# Patient Record
Sex: MALE | Race: BLACK OR AFRICAN AMERICAN | Employment: UNEMPLOYED | URBAN - METROPOLITAN AREA
[De-identification: names, ages, dates, MRNs, and addresses within clinical notes are randomized per-mention and may not be internally consistent; named-entity substitution may affect disease eponyms.]

---

## 2015-11-14 LAB
LEFT VENTRICULAR EJECTION FRACTION MODE: NORMAL
LV EF: 40 %

## 2018-05-09 ENCOUNTER — HOSPITAL ENCOUNTER (OUTPATIENT)
Age: 53
Discharge: HOME | End: 2018-05-09
Payer: COMMERCIAL

## 2018-05-09 ENCOUNTER — HOSPITAL ENCOUNTER (OUTPATIENT)
Dept: GENERAL RADIOLOGY | Age: 53
Discharge: HOME | End: 2018-05-11
Payer: COMMERCIAL

## 2018-05-09 ENCOUNTER — HOSPITAL ENCOUNTER (OUTPATIENT)
Age: 53
Discharge: HOME | End: 2018-05-11
Payer: COMMERCIAL

## 2018-05-09 DIAGNOSIS — R06.02 SOB (SHORTNESS OF BREATH): ICD-10-CM

## 2018-05-09 LAB — C4 COMPLEMENT: 41 MG/DL (ref 10–40)

## 2018-05-09 PROCEDURE — 86481 TB AG RESPONSE T-CELL SUSP: CPT

## 2018-05-09 PROCEDURE — 36415 COLL VENOUS BLD VENIPUNCTURE: CPT

## 2018-05-09 PROCEDURE — 86160 COMPLEMENT ANTIGEN: CPT

## 2018-05-09 PROCEDURE — 71046 X-RAY EXAM CHEST 2 VIEWS: CPT

## 2018-05-14 LAB
COMMENT: NORMAL
REPORT: NORMAL

## 2018-07-31 ENCOUNTER — HOSPITAL ENCOUNTER (OUTPATIENT)
Age: 53
Discharge: HOME | End: 2018-08-02

## 2018-07-31 LAB — POTASSIUM SERPL-SCNC: 3.5 MMOL/L (ref 3.5–5)

## 2018-07-31 PROCEDURE — 84132 ASSAY OF SERUM POTASSIUM: CPT

## 2018-12-04 ENCOUNTER — HOSPITAL ENCOUNTER (EMERGENCY)
Age: 53
Discharge: HOME | End: 2018-12-04
Payer: COMMERCIAL

## 2018-12-04 VITALS
SYSTOLIC BLOOD PRESSURE: 156 MMHG | RESPIRATION RATE: 18 BRPM | OXYGEN SATURATION: 98 % | DIASTOLIC BLOOD PRESSURE: 96 MMHG | HEART RATE: 80 BPM | WEIGHT: 197 LBS | BODY MASS INDEX: 31.66 KG/M2 | TEMPERATURE: 97.8 F | HEIGHT: 66 IN

## 2018-12-04 DIAGNOSIS — E87.6 HYPOKALEMIA: ICD-10-CM

## 2018-12-04 DIAGNOSIS — Z00.8 MEDICAL CLEARANCE FOR INCARCERATION: ICD-10-CM

## 2018-12-04 DIAGNOSIS — I10 ESSENTIAL HYPERTENSION: Primary | ICD-10-CM

## 2018-12-04 LAB
EKG ATRIAL RATE: 77 BPM
EKG P AXIS: 63 DEGREES
EKG P-R INTERVAL: 152 MS
EKG Q-T INTERVAL: 392 MS
EKG QRS DURATION: 102 MS
EKG QTC CALCULATION (BAZETT): 443 MS
EKG R AXIS: 16 DEGREES
EKG T AXIS: -24 DEGREES
EKG VENTRICULAR RATE: 77 BPM
GFR AFRICAN AMERICAN: 6
GFR NON-AFRICAN AMERICAN: 5 ML/MIN/1.73
GLUCOSE BLD-MCNC: 165 MG/DL (ref 74–99)
POC CHLORIDE: 102 MMOL/L (ref 100–108)
POC CREATININE: 10.3 MG/DL (ref 0.7–1.2)
POC POTASSIUM: 3.4 MMOL/L (ref 3.5–5)
POC SODIUM: 142 MMOL/L (ref 132–146)

## 2018-12-04 PROCEDURE — 84132 ASSAY OF SERUM POTASSIUM: CPT

## 2018-12-04 PROCEDURE — 93005 ELECTROCARDIOGRAM TRACING: CPT | Performed by: NURSE PRACTITIONER

## 2018-12-04 PROCEDURE — 84295 ASSAY OF SERUM SODIUM: CPT

## 2018-12-04 PROCEDURE — 82565 ASSAY OF CREATININE: CPT

## 2018-12-04 PROCEDURE — 6370000000 HC RX 637 (ALT 250 FOR IP): Performed by: NURSE PRACTITIONER

## 2018-12-04 PROCEDURE — 99283 EMERGENCY DEPT VISIT LOW MDM: CPT

## 2018-12-04 PROCEDURE — 82435 ASSAY OF BLOOD CHLORIDE: CPT

## 2018-12-04 PROCEDURE — 82947 ASSAY GLUCOSE BLOOD QUANT: CPT

## 2018-12-04 RX ORDER — POTASSIUM BICARBONATE 25 MEQ/1
25 TABLET, EFFERVESCENT ORAL ONCE
Status: COMPLETED | OUTPATIENT
Start: 2018-12-04 | End: 2018-12-04

## 2018-12-04 RX ADMIN — POTASSIUM BICARBONATE 25 MEQ: 25 TABLET, EFFERVESCENT ORAL at 21:44

## 2019-02-08 ENCOUNTER — APPOINTMENT (OUTPATIENT)
Dept: CT IMAGING | Age: 54
End: 2019-02-08
Payer: COMMERCIAL

## 2019-02-08 ENCOUNTER — HOSPITAL ENCOUNTER (EMERGENCY)
Age: 54
Discharge: HOME | End: 2019-02-08
Attending: EMERGENCY MEDICINE
Payer: COMMERCIAL

## 2019-02-08 VITALS
HEART RATE: 76 BPM | RESPIRATION RATE: 16 BRPM | WEIGHT: 197 LBS | DIASTOLIC BLOOD PRESSURE: 98 MMHG | HEIGHT: 66 IN | OXYGEN SATURATION: 97 % | TEMPERATURE: 97.8 F | BODY MASS INDEX: 31.66 KG/M2 | SYSTOLIC BLOOD PRESSURE: 150 MMHG

## 2019-02-08 DIAGNOSIS — R31.9 HEMATURIA, UNSPECIFIED TYPE: Primary | ICD-10-CM

## 2019-02-08 DIAGNOSIS — I71.40 ABDOMINAL AORTIC ANEURYSM (AAA) WITHOUT RUPTURE: ICD-10-CM

## 2019-02-08 LAB
ALBUMIN SERPL-MCNC: 4.4 G/DL (ref 3.5–5.2)
ALP BLD-CCNC: 94 U/L (ref 40–129)
ALT SERPL-CCNC: <5 U/L (ref 0–40)
ANION GAP SERPL CALCULATED.3IONS-SCNC: 10 MMOL/L (ref 7–16)
AST SERPL-CCNC: 11 U/L (ref 0–39)
BASOPHILS ABSOLUTE: 0.05 E9/L (ref 0–0.2)
BASOPHILS RELATIVE PERCENT: 0.8 % (ref 0–2)
BILIRUB SERPL-MCNC: 0.3 MG/DL (ref 0–1.2)
BUN BLDV-MCNC: 28 MG/DL (ref 6–20)
CALCIUM SERPL-MCNC: 9.7 MG/DL (ref 8.6–10.2)
CHLORIDE BLD-SCNC: 97 MMOL/L (ref 98–107)
CO2: 33 MMOL/L (ref 22–29)
CREAT SERPL-MCNC: 9.2 MG/DL (ref 0.7–1.2)
EKG ATRIAL RATE: 66 BPM
EKG P AXIS: 70 DEGREES
EKG P-R INTERVAL: 164 MS
EKG Q-T INTERVAL: 422 MS
EKG QRS DURATION: 110 MS
EKG QTC CALCULATION (BAZETT): 442 MS
EKG R AXIS: 52 DEGREES
EKG T AXIS: -18 DEGREES
EKG VENTRICULAR RATE: 66 BPM
EOSINOPHILS ABSOLUTE: 0.16 E9/L (ref 0.05–0.5)
EOSINOPHILS RELATIVE PERCENT: 2.5 % (ref 0–6)
GFR AFRICAN AMERICAN: 7
GFR NON-AFRICAN AMERICAN: 7 ML/MIN/1.73
GLUCOSE BLD-MCNC: 54 MG/DL (ref 74–99)
HCT VFR BLD CALC: 36.7 % (ref 37–54)
HEMOGLOBIN: 11.3 G/DL (ref 12.5–16.5)
IMMATURE GRANULOCYTES #: 0.02 E9/L
IMMATURE GRANULOCYTES %: 0.3 % (ref 0–5)
LYMPHOCYTES ABSOLUTE: 1.6 E9/L (ref 1.5–4)
LYMPHOCYTES RELATIVE PERCENT: 24.9 % (ref 20–42)
MCH RBC QN AUTO: 29.6 PG (ref 26–35)
MCHC RBC AUTO-ENTMCNC: 30.8 % (ref 32–34.5)
MCV RBC AUTO: 96.1 FL (ref 80–99.9)
MONOCYTES ABSOLUTE: 0.67 E9/L (ref 0.1–0.95)
MONOCYTES RELATIVE PERCENT: 10.4 % (ref 2–12)
NEUTROPHILS ABSOLUTE: 3.93 E9/L (ref 1.8–7.3)
NEUTROPHILS RELATIVE PERCENT: 61.1 % (ref 43–80)
PDW BLD-RTO: 17.2 FL (ref 11.5–15)
PLATELET # BLD: 209 E9/L (ref 130–450)
PMV BLD AUTO: 8.9 FL (ref 7–12)
POTASSIUM SERPL-SCNC: 4 MMOL/L (ref 3.5–5)
RBC # BLD: 3.82 E12/L (ref 3.8–5.8)
SODIUM BLD-SCNC: 140 MMOL/L (ref 132–146)
TOTAL PROTEIN: 8.1 G/DL (ref 6.4–8.3)
WBC # BLD: 6.4 E9/L (ref 4.5–11.5)

## 2019-02-08 PROCEDURE — 93005 ELECTROCARDIOGRAM TRACING: CPT | Performed by: PHYSICIAN ASSISTANT

## 2019-02-08 PROCEDURE — 74176 CT ABD & PELVIS W/O CONTRAST: CPT

## 2019-02-08 PROCEDURE — 99284 EMERGENCY DEPT VISIT MOD MDM: CPT

## 2019-02-08 PROCEDURE — 99219 PR INITIAL OBSERVATION CARE/DAY 50 MINUTES: CPT | Performed by: SURGERY

## 2019-02-08 PROCEDURE — 87088 URINE BACTERIA CULTURE: CPT

## 2019-02-08 PROCEDURE — 80053 COMPREHEN METABOLIC PANEL: CPT

## 2019-02-08 PROCEDURE — 85025 COMPLETE CBC W/AUTO DIFF WBC: CPT

## 2019-02-08 PROCEDURE — 36415 COLL VENOUS BLD VENIPUNCTURE: CPT

## 2019-02-08 RX ORDER — CEFDINIR 300 MG/1
300 CAPSULE ORAL DAILY
Qty: 7 CAPSULE | Refills: 0 | Status: SHIPPED | OUTPATIENT
Start: 2019-02-08 | End: 2019-02-15

## 2019-02-10 LAB — URINE CULTURE, ROUTINE: NORMAL

## 2019-02-11 ENCOUNTER — TELEPHONE (OUTPATIENT)
Dept: VASCULAR SURGERY | Age: 54
End: 2019-02-11

## 2019-02-26 ENCOUNTER — OFFICE VISIT (OUTPATIENT)
Dept: VASCULAR SURGERY | Age: 54
End: 2019-02-26
Payer: COMMERCIAL

## 2019-02-26 DIAGNOSIS — N18.6 ESRD (END STAGE RENAL DISEASE) (HCC): ICD-10-CM

## 2019-02-26 DIAGNOSIS — I71.40 AAA (ABDOMINAL AORTIC ANEURYSM) WITHOUT RUPTURE: Primary | ICD-10-CM

## 2019-02-26 PROCEDURE — 99213 OFFICE O/P EST LOW 20 MIN: CPT | Performed by: SURGERY

## 2019-02-26 PROCEDURE — 3017F COLORECTAL CA SCREEN DOC REV: CPT | Performed by: SURGERY

## 2019-02-26 PROCEDURE — G8427 DOCREV CUR MEDS BY ELIG CLIN: HCPCS | Performed by: SURGERY

## 2019-02-26 PROCEDURE — G8417 CALC BMI ABV UP PARAM F/U: HCPCS | Performed by: SURGERY

## 2019-02-26 PROCEDURE — 1036F TOBACCO NON-USER: CPT | Performed by: SURGERY

## 2019-02-26 PROCEDURE — G8484 FLU IMMUNIZE NO ADMIN: HCPCS | Performed by: SURGERY

## 2019-03-01 ENCOUNTER — OFFICE VISIT (OUTPATIENT)
Dept: CARDIOLOGY CLINIC | Age: 54
End: 2019-03-01
Payer: COMMERCIAL

## 2019-03-01 VITALS
RESPIRATION RATE: 16 BRPM | SYSTOLIC BLOOD PRESSURE: 110 MMHG | HEIGHT: 66 IN | DIASTOLIC BLOOD PRESSURE: 60 MMHG | HEART RATE: 90 BPM | BODY MASS INDEX: 32.37 KG/M2 | WEIGHT: 201.4 LBS

## 2019-03-01 DIAGNOSIS — I71.40 AAA (ABDOMINAL AORTIC ANEURYSM) WITHOUT RUPTURE: Primary | ICD-10-CM

## 2019-03-01 DIAGNOSIS — Z99.2 ESRD (END STAGE RENAL DISEASE) ON DIALYSIS (HCC): ICD-10-CM

## 2019-03-01 DIAGNOSIS — I10 ESSENTIAL HYPERTENSION: ICD-10-CM

## 2019-03-01 DIAGNOSIS — Z01.810 ENCOUNTER FOR PRE-OPERATIVE CARDIOVASCULAR CLEARANCE: ICD-10-CM

## 2019-03-01 DIAGNOSIS — N18.6 ESRD (END STAGE RENAL DISEASE) ON DIALYSIS (HCC): ICD-10-CM

## 2019-03-01 PROCEDURE — 1036F TOBACCO NON-USER: CPT | Performed by: INTERNAL MEDICINE

## 2019-03-01 PROCEDURE — G8427 DOCREV CUR MEDS BY ELIG CLIN: HCPCS | Performed by: INTERNAL MEDICINE

## 2019-03-01 PROCEDURE — 99204 OFFICE O/P NEW MOD 45 MIN: CPT | Performed by: INTERNAL MEDICINE

## 2019-03-01 PROCEDURE — G8484 FLU IMMUNIZE NO ADMIN: HCPCS | Performed by: INTERNAL MEDICINE

## 2019-03-01 PROCEDURE — 3017F COLORECTAL CA SCREEN DOC REV: CPT | Performed by: INTERNAL MEDICINE

## 2019-03-01 PROCEDURE — 93000 ELECTROCARDIOGRAM COMPLETE: CPT | Performed by: INTERNAL MEDICINE

## 2019-03-01 PROCEDURE — G8417 CALC BMI ABV UP PARAM F/U: HCPCS | Performed by: INTERNAL MEDICINE

## 2019-03-01 RX ORDER — DIPHENHYDRAMINE HCL 25 MG
25 TABLET ORAL EVERY 6 HOURS PRN
COMMUNITY
End: 2020-05-01

## 2019-03-01 RX ORDER — HYDRALAZINE HYDROCHLORIDE 50 MG/1
50 TABLET, FILM COATED ORAL 3 TIMES DAILY
COMMUNITY
End: 2020-05-01

## 2019-03-01 RX ORDER — SEVELAMER CARBONATE 800 MG/1
2 TABLET, FILM COATED ORAL
COMMUNITY
End: 2020-05-01

## 2019-03-01 RX ORDER — LIDOCAINE AND PRILOCAINE 25; 25 MG/G; MG/G
CREAM TOPICAL
COMMUNITY
End: 2020-05-01

## 2019-03-01 RX ORDER — GLIPIZIDE 5 MG/1
5 TABLET ORAL
COMMUNITY
End: 2020-05-01

## 2019-03-01 RX ORDER — CINACALCET 30 MG/1
30 TABLET, FILM COATED ORAL DAILY
COMMUNITY
End: 2021-01-01

## 2019-03-08 ENCOUNTER — HOSPITAL ENCOUNTER (EMERGENCY)
Age: 54
Discharge: HOME | End: 2019-03-08
Payer: COMMERCIAL

## 2019-03-08 VITALS
DIASTOLIC BLOOD PRESSURE: 89 MMHG | RESPIRATION RATE: 20 BRPM | TEMPERATURE: 97.3 F | HEART RATE: 97 BPM | OXYGEN SATURATION: 98 % | SYSTOLIC BLOOD PRESSURE: 140 MMHG

## 2019-03-08 DIAGNOSIS — Z20.2 EXPOSURE TO STD: Primary | ICD-10-CM

## 2019-03-08 PROCEDURE — 2580000003 HC RX 258

## 2019-03-08 PROCEDURE — 6370000000 HC RX 637 (ALT 250 FOR IP): Performed by: PHYSICIAN ASSISTANT

## 2019-03-08 PROCEDURE — 6360000002 HC RX W HCPCS: Performed by: PHYSICIAN ASSISTANT

## 2019-03-08 PROCEDURE — 99282 EMERGENCY DEPT VISIT SF MDM: CPT

## 2019-03-08 PROCEDURE — 96372 THER/PROPH/DIAG INJ SC/IM: CPT

## 2019-03-08 RX ORDER — AZITHROMYCIN 250 MG/1
1000 TABLET, FILM COATED ORAL ONCE
Status: COMPLETED | OUTPATIENT
Start: 2019-03-08 | End: 2019-03-08

## 2019-03-08 RX ORDER — CEFTRIAXONE SODIUM 250 MG/1
250 INJECTION, POWDER, FOR SOLUTION INTRAMUSCULAR; INTRAVENOUS ONCE
Status: COMPLETED | OUTPATIENT
Start: 2019-03-08 | End: 2019-03-08

## 2019-03-08 RX ORDER — SULFAMETHOXAZOLE AND TRIMETHOPRIM 800; 160 MG/1; MG/1
1 TABLET ORAL 2 TIMES DAILY
Qty: 10 TABLET | Refills: 0 | Status: SHIPPED | OUTPATIENT
Start: 2019-03-08 | End: 2019-03-13

## 2019-03-08 RX ADMIN — CEFTRIAXONE SODIUM 250 MG: 250 INJECTION, POWDER, FOR SOLUTION INTRAMUSCULAR; INTRAVENOUS at 16:53

## 2019-03-08 RX ADMIN — AZITHROMYCIN 1000 MG: 250 TABLET, FILM COATED ORAL at 16:54

## 2019-03-08 RX ADMIN — WATER 10 ML: 1 INJECTION INTRAMUSCULAR; INTRAVENOUS; SUBCUTANEOUS at 16:54

## 2019-03-20 ENCOUNTER — HOSPITAL ENCOUNTER (OUTPATIENT)
Dept: CARDIOLOGY | Age: 54
Discharge: HOME | End: 2019-03-20
Payer: COMMERCIAL

## 2019-03-20 ENCOUNTER — TELEPHONE (OUTPATIENT)
Dept: CARDIOLOGY | Age: 54
End: 2019-03-20

## 2019-03-20 DIAGNOSIS — Z01.810 ENCOUNTER FOR PRE-OPERATIVE CARDIOVASCULAR CLEARANCE: ICD-10-CM

## 2019-03-20 DIAGNOSIS — I71.40 AAA (ABDOMINAL AORTIC ANEURYSM) WITHOUT RUPTURE: ICD-10-CM

## 2019-03-20 DIAGNOSIS — Z99.2 ESRD (END STAGE RENAL DISEASE) ON DIALYSIS (HCC): ICD-10-CM

## 2019-03-20 DIAGNOSIS — N18.6 ESRD (END STAGE RENAL DISEASE) ON DIALYSIS (HCC): ICD-10-CM

## 2019-04-01 ENCOUNTER — TELEPHONE (OUTPATIENT)
Dept: CARDIOLOGY | Age: 54
End: 2019-04-01

## 2019-04-01 NOTE — TELEPHONE ENCOUNTER
Left message with patient to reschedule this stress test asap. He is also scheduled for an echo 4/18/19 @ 10:30AM which we moved another person to give him this spot because he needs CC for SX with Dr. Elder Pastor. Patient has appt 4/30/19 to go over results of both testing and get clearance with Asiya Whittaker CNP     I asked patient to call asap and reschedule stress and to keep both other appts. Call scheduling Ext [phone] and my ext [phone].     LUKEN

## 2019-04-11 ENCOUNTER — TELEPHONE (OUTPATIENT)
Dept: CARDIOLOGY CLINIC | Age: 54
End: 2019-04-11

## 2019-04-11 NOTE — TELEPHONE ENCOUNTER
4/11/2019  Left message with patient the his appt yesterday that he no showed for has been rescheduled for April 18, 2019 @ 10:30 for Echo. I also added on his Lexiscan stress test that he no showed for and refused to have any testing done also for same day 4/18/19 @ 9:00am.  Sent letter to patient also with instructions again for stress and echo. F/u we will keep for 4/30/19 with [facility] for Dr. Flores Awan. Patient needs cardiac clearance for surgery with Jeremy Limon. This was an urgent request from Dr. Francis Veloz office.     MCN/RV

## 2019-04-18 ENCOUNTER — TELEPHONE (OUTPATIENT)
Dept: CARDIOLOGY | Age: 54
End: 2019-04-18

## 2019-04-30 ENCOUNTER — TELEPHONE (OUTPATIENT)
Dept: CARDIOLOGY CLINIC | Age: 54
End: 2019-04-30

## 2019-04-30 NOTE — TELEPHONE ENCOUNTER
Patient scheduled for an appointment 4/30/2019 for cardiac clearance for surgery by Dr Gabe Sosa.     He did not arrive for his appointment today, sent letter, called patient and notified Dr Elliott Sanchez office patient did not arrive

## 2019-10-17 ENCOUNTER — APPOINTMENT (OUTPATIENT)
Dept: CT IMAGING | Age: 54
End: 2019-10-17
Payer: COMMERCIAL

## 2019-10-17 ENCOUNTER — HOSPITAL ENCOUNTER (EMERGENCY)
Age: 54
Discharge: HOME | End: 2019-10-17
Attending: EMERGENCY MEDICINE
Payer: COMMERCIAL

## 2019-10-17 ENCOUNTER — APPOINTMENT (OUTPATIENT)
Dept: GENERAL RADIOLOGY | Age: 54
End: 2019-10-17
Payer: COMMERCIAL

## 2019-10-17 VITALS
DIASTOLIC BLOOD PRESSURE: 92 MMHG | SYSTOLIC BLOOD PRESSURE: 156 MMHG | RESPIRATION RATE: 16 BRPM | HEART RATE: 73 BPM | BODY MASS INDEX: 31.72 KG/M2 | HEIGHT: 66 IN | WEIGHT: 197.38 LBS | TEMPERATURE: 98.4 F | OXYGEN SATURATION: 99 %

## 2019-10-17 DIAGNOSIS — R42 LIGHTHEADEDNESS: Primary | ICD-10-CM

## 2019-10-17 DIAGNOSIS — R06.02 SHORTNESS OF BREATH: ICD-10-CM

## 2019-10-17 LAB
ALBUMIN SERPL-MCNC: 4.6 G/DL (ref 3.5–5.2)
ALP BLD-CCNC: 77 U/L (ref 40–129)
ALT SERPL-CCNC: 6 U/L (ref 0–40)
ANION GAP SERPL CALCULATED.3IONS-SCNC: 16 MMOL/L (ref 7–16)
AST SERPL-CCNC: 12 U/L (ref 0–39)
BASOPHILS ABSOLUTE: 0.06 E9/L (ref 0–0.2)
BASOPHILS RELATIVE PERCENT: 1.2 % (ref 0–2)
BILIRUB SERPL-MCNC: 0.5 MG/DL (ref 0–1.2)
BUN BLDV-MCNC: 14 MG/DL (ref 6–20)
CALCIUM SERPL-MCNC: 8.8 MG/DL (ref 8.6–10.2)
CHLORIDE BLD-SCNC: 96 MMOL/L (ref 98–107)
CO2: 29 MMOL/L (ref 22–29)
CREAT SERPL-MCNC: 6.3 MG/DL (ref 0.7–1.2)
EKG ATRIAL RATE: 80 BPM
EKG P AXIS: 77 DEGREES
EKG P-R INTERVAL: 138 MS
EKG Q-T INTERVAL: 390 MS
EKG QRS DURATION: 100 MS
EKG QTC CALCULATION (BAZETT): 449 MS
EKG R AXIS: 15 DEGREES
EKG T AXIS: 40 DEGREES
EKG VENTRICULAR RATE: 80 BPM
EOSINOPHILS ABSOLUTE: 0.07 E9/L (ref 0.05–0.5)
EOSINOPHILS RELATIVE PERCENT: 1.4 % (ref 0–6)
GFR AFRICAN AMERICAN: 11
GFR NON-AFRICAN AMERICAN: 11 ML/MIN/1.73
GLUCOSE BLD-MCNC: 119 MG/DL (ref 74–99)
HCT VFR BLD CALC: 34.5 % (ref 37–54)
HEMOGLOBIN: 11.3 G/DL (ref 12.5–16.5)
IMMATURE GRANULOCYTES #: 0.01 E9/L
IMMATURE GRANULOCYTES %: 0.2 % (ref 0–5)
LYMPHOCYTES ABSOLUTE: 1.83 E9/L (ref 1.5–4)
LYMPHOCYTES RELATIVE PERCENT: 37.3 % (ref 20–42)
MCH RBC QN AUTO: 29.4 PG (ref 26–35)
MCHC RBC AUTO-ENTMCNC: 32.8 % (ref 32–34.5)
MCV RBC AUTO: 89.8 FL (ref 80–99.9)
MONOCYTES ABSOLUTE: 0.74 E9/L (ref 0.1–0.95)
MONOCYTES RELATIVE PERCENT: 15.1 % (ref 2–12)
NEUTROPHILS ABSOLUTE: 2.2 E9/L (ref 1.8–7.3)
NEUTROPHILS RELATIVE PERCENT: 44.8 % (ref 43–80)
PDW BLD-RTO: 16.9 FL (ref 11.5–15)
PLATELET # BLD: 155 E9/L (ref 130–450)
PMV BLD AUTO: 10.8 FL (ref 7–12)
POTASSIUM SERPL-SCNC: 3.8 MMOL/L (ref 3.5–5)
PRO-BNP: ABNORMAL PG/ML (ref 0–125)
RBC # BLD: 3.84 E12/L (ref 3.8–5.8)
SODIUM BLD-SCNC: 141 MMOL/L (ref 132–146)
TOTAL PROTEIN: 8.2 G/DL (ref 6.4–8.3)
TROPONIN: 0.08 NG/ML (ref 0–0.03)
WBC # BLD: 4.9 E9/L (ref 4.5–11.5)

## 2019-10-17 PROCEDURE — 2580000003 HC RX 258: Performed by: RADIOLOGY

## 2019-10-17 PROCEDURE — 36415 COLL VENOUS BLD VENIPUNCTURE: CPT

## 2019-10-17 PROCEDURE — 70450 CT HEAD/BRAIN W/O DYE: CPT

## 2019-10-17 PROCEDURE — 71275 CT ANGIOGRAPHY CHEST: CPT

## 2019-10-17 PROCEDURE — 71046 X-RAY EXAM CHEST 2 VIEWS: CPT

## 2019-10-17 PROCEDURE — 93010 ELECTROCARDIOGRAM REPORT: CPT | Performed by: INTERNAL MEDICINE

## 2019-10-17 PROCEDURE — 99285 EMERGENCY DEPT VISIT HI MDM: CPT

## 2019-10-17 PROCEDURE — 6360000004 HC RX CONTRAST MEDICATION: Performed by: RADIOLOGY

## 2019-10-17 PROCEDURE — 84484 ASSAY OF TROPONIN QUANT: CPT

## 2019-10-17 PROCEDURE — 96374 THER/PROPH/DIAG INJ IV PUSH: CPT

## 2019-10-17 PROCEDURE — 93005 ELECTROCARDIOGRAM TRACING: CPT | Performed by: PHYSICIAN ASSISTANT

## 2019-10-17 PROCEDURE — 85025 COMPLETE CBC W/AUTO DIFF WBC: CPT

## 2019-10-17 PROCEDURE — 2500000003 HC RX 250 WO HCPCS: Performed by: EMERGENCY MEDICINE

## 2019-10-17 PROCEDURE — 83880 ASSAY OF NATRIURETIC PEPTIDE: CPT

## 2019-10-17 PROCEDURE — 80053 COMPREHEN METABOLIC PANEL: CPT

## 2019-10-17 RX ORDER — SODIUM CHLORIDE 0.9 % (FLUSH) 0.9 %
10 SYRINGE (ML) INJECTION PRN
Status: COMPLETED | OUTPATIENT
Start: 2019-10-17 | End: 2019-10-17

## 2019-10-17 RX ORDER — LABETALOL HYDROCHLORIDE 5 MG/ML
10 INJECTION, SOLUTION INTRAVENOUS ONCE
Status: COMPLETED | OUTPATIENT
Start: 2019-10-17 | End: 2019-10-17

## 2019-10-17 RX ADMIN — Medication 10 ML: at 20:21

## 2019-10-17 RX ADMIN — IOPAMIDOL 60 ML: 755 INJECTION, SOLUTION INTRAVENOUS at 20:21

## 2019-10-17 RX ADMIN — LABETALOL HYDROCHLORIDE 10 MG: 5 INJECTION INTRAVENOUS at 17:53

## 2019-10-17 ASSESSMENT — ENCOUNTER SYMPTOMS
CHOKING: 0
VOMITING: 0
SHORTNESS OF BREATH: 1
ALLERGIC/IMMUNOLOGIC NEGATIVE: 1
ABDOMINAL PAIN: 0
DIARRHEA: 0
WHEEZING: 0
NAUSEA: 0
CONSTIPATION: 0
EYE PAIN: 0
CHEST TIGHTNESS: 0
COUGH: 1

## 2019-11-12 ENCOUNTER — HOSPITAL ENCOUNTER (OUTPATIENT)
Age: 54
Discharge: HOME | End: 2019-11-14

## 2019-11-12 LAB — HEMOGLOBIN: 12.3 G/DL (ref 12.5–16.5)

## 2019-11-12 PROCEDURE — 85018 HEMOGLOBIN: CPT

## 2020-01-01 ENCOUNTER — TELEPHONE (OUTPATIENT)
Dept: NON INVASIVE DIAGNOSTICS | Age: 55
End: 2020-01-01

## 2020-01-01 ENCOUNTER — APPOINTMENT (OUTPATIENT)
Dept: MRI IMAGING | Age: 55
DRG: 342 | End: 2020-01-01
Payer: COMMERCIAL

## 2020-01-01 ENCOUNTER — OFFICE VISIT (OUTPATIENT)
Dept: ORTHOPEDIC SURGERY | Age: 55
End: 2020-01-01
Payer: COMMERCIAL

## 2020-01-01 ENCOUNTER — TELEPHONE (OUTPATIENT)
Dept: VASCULAR SURGERY | Age: 55
End: 2020-01-01

## 2020-01-01 ENCOUNTER — TELEPHONE (OUTPATIENT)
Dept: CARDIOLOGY CLINIC | Age: 55
End: 2020-01-01

## 2020-01-01 ENCOUNTER — HOSPITAL ENCOUNTER (OUTPATIENT)
Dept: GENERAL RADIOLOGY | Age: 55
Discharge: HOME | End: 2020-09-11
Payer: COMMERCIAL

## 2020-01-01 ENCOUNTER — HOSPITAL ENCOUNTER (INPATIENT)
Age: 55
LOS: 4 days | Discharge: HOME | DRG: 342 | End: 2020-08-31
Attending: EMERGENCY MEDICINE | Admitting: FAMILY MEDICINE
Payer: COMMERCIAL

## 2020-01-01 ENCOUNTER — APPOINTMENT (OUTPATIENT)
Dept: CT IMAGING | Age: 55
DRG: 342 | End: 2020-01-01
Payer: COMMERCIAL

## 2020-01-01 ENCOUNTER — PREP FOR PROCEDURE (OUTPATIENT)
Dept: UROLOGY | Age: 55
End: 2020-01-01

## 2020-01-01 ENCOUNTER — OFFICE VISIT (OUTPATIENT)
Dept: NON INVASIVE DIAGNOSTICS | Age: 55
End: 2020-01-01
Payer: COMMERCIAL

## 2020-01-01 ENCOUNTER — HOSPITAL ENCOUNTER (OUTPATIENT)
Age: 55
Setting detail: OUTPATIENT SURGERY
Discharge: HOME | End: 2020-12-09
Attending: SURGERY | Admitting: SURGERY
Payer: COMMERCIAL

## 2020-01-01 ENCOUNTER — OFFICE VISIT (OUTPATIENT)
Dept: VASCULAR SURGERY | Age: 55
End: 2020-01-01

## 2020-01-01 ENCOUNTER — HOSPITAL ENCOUNTER (OUTPATIENT)
Age: 55
Setting detail: OUTPATIENT SURGERY
Discharge: HOME | End: 2020-11-02
Attending: SURGERY | Admitting: SURGERY
Payer: COMMERCIAL

## 2020-01-01 ENCOUNTER — APPOINTMENT (OUTPATIENT)
Dept: GENERAL RADIOLOGY | Age: 55
End: 2020-01-01
Attending: SURGERY
Payer: COMMERCIAL

## 2020-01-01 ENCOUNTER — TELEPHONE (OUTPATIENT)
Dept: ADMINISTRATIVE | Age: 55
End: 2020-01-01

## 2020-01-01 ENCOUNTER — ANESTHESIA EVENT (OUTPATIENT)
Dept: OPERATING ROOM | Age: 55
End: 2020-01-01
Payer: COMMERCIAL

## 2020-01-01 ENCOUNTER — APPOINTMENT (OUTPATIENT)
Dept: GENERAL RADIOLOGY | Age: 55
DRG: 342 | End: 2020-01-01
Payer: COMMERCIAL

## 2020-01-01 ENCOUNTER — ANESTHESIA (OUTPATIENT)
Dept: OPERATING ROOM | Age: 55
End: 2020-01-01
Payer: COMMERCIAL

## 2020-01-01 ENCOUNTER — OFFICE VISIT (OUTPATIENT)
Dept: VASCULAR SURGERY | Age: 55
End: 2020-01-01
Payer: COMMERCIAL

## 2020-01-01 ENCOUNTER — HOSPITAL ENCOUNTER (OUTPATIENT)
Age: 55
Discharge: HOME | End: 2020-11-01
Payer: COMMERCIAL

## 2020-01-01 VITALS
RESPIRATION RATE: 17 BRPM | TEMPERATURE: 97.4 F | WEIGHT: 183.5 LBS | SYSTOLIC BLOOD PRESSURE: 139 MMHG | OXYGEN SATURATION: 95 % | HEART RATE: 70 BPM | HEIGHT: 66 IN | BODY MASS INDEX: 29.49 KG/M2 | DIASTOLIC BLOOD PRESSURE: 84 MMHG

## 2020-01-01 VITALS
RESPIRATION RATE: 16 BRPM | HEART RATE: 61 BPM | WEIGHT: 190 LBS | SYSTOLIC BLOOD PRESSURE: 124 MMHG | BODY MASS INDEX: 30.53 KG/M2 | DIASTOLIC BLOOD PRESSURE: 80 MMHG | TEMPERATURE: 98.6 F | HEIGHT: 66 IN

## 2020-01-01 VITALS
DIASTOLIC BLOOD PRESSURE: 104 MMHG | BODY MASS INDEX: 27.32 KG/M2 | HEIGHT: 66 IN | HEART RATE: 83 BPM | WEIGHT: 170 LBS | SYSTOLIC BLOOD PRESSURE: 170 MMHG

## 2020-01-01 VITALS
RESPIRATION RATE: 20 BRPM | SYSTOLIC BLOOD PRESSURE: 163 MMHG | HEART RATE: 65 BPM | OXYGEN SATURATION: 93 % | WEIGHT: 170 LBS | HEIGHT: 66 IN | DIASTOLIC BLOOD PRESSURE: 91 MMHG | BODY MASS INDEX: 27.32 KG/M2 | TEMPERATURE: 97.4 F

## 2020-01-01 VITALS
BODY MASS INDEX: 27.32 KG/M2 | WEIGHT: 170 LBS | OXYGEN SATURATION: 95 % | RESPIRATION RATE: 16 BRPM | SYSTOLIC BLOOD PRESSURE: 164 MMHG | TEMPERATURE: 97.7 F | HEART RATE: 94 BPM | DIASTOLIC BLOOD PRESSURE: 107 MMHG | HEIGHT: 66 IN

## 2020-01-01 VITALS — BODY MASS INDEX: 28.93 KG/M2 | RESPIRATION RATE: 16 BRPM | HEIGHT: 66 IN | WEIGHT: 180 LBS

## 2020-01-01 VITALS — SYSTOLIC BLOOD PRESSURE: 131 MMHG | OXYGEN SATURATION: 89 % | DIASTOLIC BLOOD PRESSURE: 102 MMHG

## 2020-01-01 VITALS
DIASTOLIC BLOOD PRESSURE: 95 MMHG | RESPIRATION RATE: 12 BRPM | SYSTOLIC BLOOD PRESSURE: 149 MMHG | OXYGEN SATURATION: 100 %

## 2020-01-01 VITALS
WEIGHT: 170 LBS | SYSTOLIC BLOOD PRESSURE: 160 MMHG | HEART RATE: 56 BPM | HEIGHT: 66 IN | DIASTOLIC BLOOD PRESSURE: 100 MMHG | BODY MASS INDEX: 27.32 KG/M2

## 2020-01-01 LAB
ACINETOBACTER BAUMANNII BY PCR: NOT DETECTED
ALBUMIN SERPL-MCNC: 3.1 G/DL (ref 3.5–5.2)
ANION GAP SERPL CALCULATED.3IONS-SCNC: 14 MMOL/L (ref 7–16)
ANION GAP SERPL CALCULATED.3IONS-SCNC: 17 MMOL/L (ref 7–16)
ANION GAP SERPL CALCULATED.3IONS-SCNC: 17 MMOL/L (ref 7–16)
ANION GAP SERPL CALCULATED.3IONS-SCNC: 18 MMOL/L (ref 7–16)
APTT: 31.9 SEC (ref 24.5–35.1)
BASOPHILS ABSOLUTE: 0.04 E9/L (ref 0–0.2)
BASOPHILS ABSOLUTE: 0.05 E9/L (ref 0–0.2)
BASOPHILS RELATIVE PERCENT: 0.7 % (ref 0–2)
BASOPHILS RELATIVE PERCENT: 0.7 % (ref 0–2)
BLOOD CULTURE, ROUTINE: ABNORMAL
BLOOD CULTURE, ROUTINE: NORMAL
BOTTLE TYPE: ABNORMAL
BUN BLDV-MCNC: 30 MG/DL (ref 6–20)
BUN BLDV-MCNC: 31 MG/DL (ref 6–20)
BUN BLDV-MCNC: 31 MG/DL (ref 6–20)
BUN BLDV-MCNC: 34 MG/DL (ref 6–20)
BUN BLDV-MCNC: 36 MG/DL (ref 6–20)
BUN BLDV-MCNC: 46 MG/DL (ref 6–20)
CALCIUM SERPL-MCNC: 8.4 MG/DL (ref 8.6–10.2)
CALCIUM SERPL-MCNC: 8.5 MG/DL (ref 8.6–10.2)
CALCIUM SERPL-MCNC: 8.5 MG/DL (ref 8.6–10.2)
CALCIUM SERPL-MCNC: 8.6 MG/DL (ref 8.6–10.2)
CALCIUM SERPL-MCNC: 8.8 MG/DL (ref 8.6–10.2)
CALCIUM SERPL-MCNC: 9 MG/DL (ref 8.6–10.2)
CANDIDA ALBICANS BY PCR: NOT DETECTED
CANDIDA GLABRATA BY PCR: NOT DETECTED
CANDIDA KRUSEI BY PCR: NOT DETECTED
CANDIDA PARAPSILOSIS BY PCR: NOT DETECTED
CANDIDA TROPICALIS BY PCR: NOT DETECTED
CHLORIDE BLD-SCNC: 94 MMOL/L (ref 98–107)
CHLORIDE BLD-SCNC: 95 MMOL/L (ref 98–107)
CHLORIDE BLD-SCNC: 95 MMOL/L (ref 98–107)
CHLORIDE BLD-SCNC: 96 MMOL/L (ref 98–107)
CHLORIDE BLD-SCNC: 96 MMOL/L (ref 98–107)
CHLORIDE BLD-SCNC: 97 MMOL/L (ref 98–107)
CO2: 24 MMOL/L (ref 22–29)
CO2: 24 MMOL/L (ref 22–29)
CO2: 25 MMOL/L (ref 22–29)
CO2: 25 MMOL/L (ref 22–29)
CO2: 26 MMOL/L (ref 22–29)
CO2: 28 MMOL/L (ref 22–29)
CREAT SERPL-MCNC: 6.6 MG/DL (ref 0.7–1.2)
CREAT SERPL-MCNC: 7.3 MG/DL (ref 0.7–1.2)
CREAT SERPL-MCNC: 8.5 MG/DL (ref 0.7–1.2)
CREAT SERPL-MCNC: 9.3 MG/DL (ref 0.7–1.2)
CREAT SERPL-MCNC: 9.4 MG/DL (ref 0.7–1.2)
CREAT SERPL-MCNC: 9.4 MG/DL (ref 0.7–1.2)
CULTURE, BLOOD 2: NORMAL
CULTURE, BLOOD 2: NORMAL
EKG ATRIAL RATE: 103 BPM
EKG ATRIAL RATE: 80 BPM
EKG P AXIS: 59 DEGREES
EKG P AXIS: 60 DEGREES
EKG P-R INTERVAL: 144 MS
EKG P-R INTERVAL: 146 MS
EKG Q-T INTERVAL: 370 MS
EKG Q-T INTERVAL: 406 MS
EKG QRS DURATION: 98 MS
EKG QRS DURATION: 98 MS
EKG QTC CALCULATION (BAZETT): 468 MS
EKG QTC CALCULATION (BAZETT): 484 MS
EKG R AXIS: -16 DEGREES
EKG R AXIS: -21 DEGREES
EKG T AXIS: -5 DEGREES
EKG T AXIS: 174 DEGREES
EKG VENTRICULAR RATE: 103 BPM
EKG VENTRICULAR RATE: 80 BPM
ENTEROBACTER CLOACAE COMPLEX BY PCR: NOT DETECTED
ENTEROBACTERALES BY PCR: NOT DETECTED
ENTEROCOCCUS BY PCR: NOT DETECTED
EOSINOPHILS ABSOLUTE: 0.09 E9/L (ref 0.05–0.5)
EOSINOPHILS ABSOLUTE: 0.1 E9/L (ref 0.05–0.5)
EOSINOPHILS RELATIVE PERCENT: 1.2 % (ref 0–6)
EOSINOPHILS RELATIVE PERCENT: 1.7 % (ref 0–6)
ESCHERICHIA COLI BY PCR: NOT DETECTED
ETHANOL: <10 MG/DL (ref 0–0.08)
FERRITIN: 814 NG/ML
FOLATE: 4.6 NG/ML (ref 4.8–24.2)
GFR AFRICAN AMERICAN: 10
GFR AFRICAN AMERICAN: 11
GFR AFRICAN AMERICAN: 7
GFR AFRICAN AMERICAN: 8
GFR NON-AFRICAN AMERICAN: 10 ML/MIN/1.73
GFR NON-AFRICAN AMERICAN: 11 ML/MIN/1.73
GFR NON-AFRICAN AMERICAN: 7 ML/MIN/1.73
GFR NON-AFRICAN AMERICAN: 8 ML/MIN/1.73
GLUCOSE BLD-MCNC: 102 MG/DL (ref 74–99)
GLUCOSE BLD-MCNC: 138 MG/DL (ref 74–99)
GLUCOSE BLD-MCNC: 140 MG/DL (ref 74–99)
GLUCOSE BLD-MCNC: 166 MG/DL (ref 74–99)
GLUCOSE BLD-MCNC: 88 MG/DL (ref 74–99)
GLUCOSE BLD-MCNC: 96 MG/DL (ref 74–99)
HAEMOPHILUS INFLUENZAE BY PCR: NOT DETECTED
HCT VFR BLD CALC: 27.9 % (ref 37–54)
HCT VFR BLD CALC: 28.7 % (ref 37–54)
HCT VFR BLD CALC: 31.1 % (ref 37–54)
HCT VFR BLD CALC: 31.3 % (ref 37–54)
HCT VFR BLD CALC: 37.2 % (ref 37–54)
HEMOGLOBIN: 11.6 G/DL (ref 12.5–16.5)
HEMOGLOBIN: 8.8 G/DL (ref 12.5–16.5)
HEMOGLOBIN: 8.8 G/DL (ref 12.5–16.5)
HEMOGLOBIN: 9.7 G/DL (ref 12.5–16.5)
HEMOGLOBIN: 9.8 G/DL (ref 12.5–16.5)
IMMATURE GRANULOCYTES #: 0.03 E9/L
IMMATURE GRANULOCYTES #: 0.04 E9/L
IMMATURE GRANULOCYTES %: 0.5 % (ref 0–5)
IMMATURE GRANULOCYTES %: 0.6 % (ref 0–5)
INR BLD: 1.3
IRON SATURATION: 29 % (ref 20–55)
IRON: 34 MCG/DL (ref 59–158)
KLEBSIELLA OXYTOCA BY PCR: NOT DETECTED
KLEBSIELLA PNEUMONIAE GROUP BY PCR: NOT DETECTED
LACTIC ACID: 1.3 MMOL/L (ref 0.5–2.2)
LISTERIA MONOCYTOGENES BY PCR: NOT DETECTED
LV EF: 28 %
LVEF MODALITY: NORMAL
LYMPHOCYTES ABSOLUTE: 0.85 E9/L (ref 1.5–4)
LYMPHOCYTES ABSOLUTE: 0.96 E9/L (ref 1.5–4)
LYMPHOCYTES RELATIVE PERCENT: 11.7 % (ref 20–42)
LYMPHOCYTES RELATIVE PERCENT: 16.8 % (ref 20–42)
MAGNESIUM: 1.9 MG/DL (ref 1.6–2.6)
MAGNESIUM: 1.9 MG/DL (ref 1.6–2.6)
MAGNESIUM: 2 MG/DL (ref 1.6–2.6)
MAGNESIUM: 2.1 MG/DL (ref 1.6–2.6)
MAGNESIUM: 2.3 MG/DL (ref 1.6–2.6)
MCH RBC QN AUTO: 26.5 PG (ref 26–35)
MCH RBC QN AUTO: 27.4 PG (ref 26–35)
MCH RBC QN AUTO: 28.1 PG (ref 26–35)
MCH RBC QN AUTO: 28.2 PG (ref 26–35)
MCH RBC QN AUTO: 28.4 PG (ref 26–35)
MCHC RBC AUTO-ENTMCNC: 30.7 % (ref 32–34.5)
MCHC RBC AUTO-ENTMCNC: 31.2 % (ref 32–34.5)
MCHC RBC AUTO-ENTMCNC: 31.2 % (ref 32–34.5)
MCHC RBC AUTO-ENTMCNC: 31.3 % (ref 32–34.5)
MCHC RBC AUTO-ENTMCNC: 31.5 % (ref 32–34.5)
MCV RBC AUTO: 84.9 FL (ref 80–99.9)
MCV RBC AUTO: 87.4 FL (ref 80–99.9)
MCV RBC AUTO: 90 FL (ref 80–99.9)
MCV RBC AUTO: 90.4 FL (ref 80–99.9)
MCV RBC AUTO: 91.7 FL (ref 80–99.9)
METER GLUCOSE: 107 MG/DL (ref 74–99)
METER GLUCOSE: 161 MG/DL (ref 74–99)
METER GLUCOSE: 68 MG/DL (ref 74–99)
METER GLUCOSE: 85 MG/DL (ref 74–99)
METHICILLIN RESISTANCE MECA/C  BY PCR: NOT DETECTED
MONOCYTES ABSOLUTE: 0.52 E9/L (ref 0.1–0.95)
MONOCYTES ABSOLUTE: 0.55 E9/L (ref 0.1–0.95)
MONOCYTES RELATIVE PERCENT: 7.6 % (ref 2–12)
MONOCYTES RELATIVE PERCENT: 9.1 % (ref 2–12)
NEISSERIA MENINGITIDIS BY PCR: NOT DETECTED
NEUTROPHILS ABSOLUTE: 4.07 E9/L (ref 1.8–7.3)
NEUTROPHILS ABSOLUTE: 5.66 E9/L (ref 1.8–7.3)
NEUTROPHILS RELATIVE PERCENT: 71.2 % (ref 43–80)
NEUTROPHILS RELATIVE PERCENT: 78.2 % (ref 43–80)
ORDER NUMBER: ABNORMAL
ORGANISM: ABNORMAL
ORGANISM: ABNORMAL
PDW BLD-RTO: 17.7 FL (ref 11.5–15)
PDW BLD-RTO: 18.2 FL (ref 11.5–15)
PDW BLD-RTO: 18.4 FL (ref 11.5–15)
PDW BLD-RTO: 18.6 FL (ref 11.5–15)
PDW BLD-RTO: 21.9 FL (ref 11.5–15)
PHOSPHORUS: 3.7 MG/DL (ref 2.5–4.5)
PHOSPHORUS: 3.8 MG/DL (ref 2.5–4.5)
PHOSPHORUS: 4.3 MG/DL (ref 2.5–4.5)
PLATELET # BLD: 158 E9/L (ref 130–450)
PLATELET # BLD: 178 E9/L (ref 130–450)
PLATELET # BLD: 189 E9/L (ref 130–450)
PLATELET # BLD: 200 E9/L (ref 130–450)
PLATELET # BLD: 247 E9/L (ref 130–450)
PMV BLD AUTO: 10 FL (ref 7–12)
PMV BLD AUTO: 10.5 FL (ref 7–12)
PMV BLD AUTO: 9.4 FL (ref 7–12)
PMV BLD AUTO: 9.5 FL (ref 7–12)
PMV BLD AUTO: 9.7 FL (ref 7–12)
POTASSIUM REFLEX MAGNESIUM: 3.1 MMOL/L (ref 3.5–5)
POTASSIUM REFLEX MAGNESIUM: 3.8 MMOL/L (ref 3.5–5)
POTASSIUM SERPL-SCNC: 3.2 MMOL/L (ref 3.5–5)
POTASSIUM SERPL-SCNC: 3.6 MMOL/L (ref 3.5–5)
POTASSIUM SERPL-SCNC: 4.1 MMOL/L (ref 3.5–5)
POTASSIUM SERPL-SCNC: 4.4 MMOL/L (ref 3.5–5)
PRO-BNP: ABNORMAL PG/ML (ref 0–125)
PROCALCITONIN: 0.8 NG/ML (ref 0–0.08)
PROTEUS BY PCR: NOT DETECTED
PROTHROMBIN TIME: 14.3 SEC (ref 9.3–12.4)
PSEUDOMONAS AERUGINOSA BY PCR: NOT DETECTED
RBC # BLD: 3.1 E12/L (ref 3.8–5.8)
RBC # BLD: 3.13 E12/L (ref 3.8–5.8)
RBC # BLD: 3.44 E12/L (ref 3.8–5.8)
RBC # BLD: 3.58 E12/L (ref 3.8–5.8)
RBC # BLD: 4.38 E12/L (ref 3.8–5.8)
SARS-COV-2, NAAT: NOT DETECTED
SARS-COV-2: NOT DETECTED
SERRATIA MARCESCENS BY PCR: NOT DETECTED
SODIUM BLD-SCNC: 135 MMOL/L (ref 132–146)
SODIUM BLD-SCNC: 135 MMOL/L (ref 132–146)
SODIUM BLD-SCNC: 137 MMOL/L (ref 132–146)
SODIUM BLD-SCNC: 139 MMOL/L (ref 132–146)
SODIUM BLD-SCNC: 139 MMOL/L (ref 132–146)
SODIUM BLD-SCNC: 142 MMOL/L (ref 132–146)
SOURCE OF BLOOD CULTURE: ABNORMAL
SOURCE: NORMAL
STAPHYLOCOCCUS AUREUS BY PCR: NOT DETECTED
STAPHYLOCOCCUS SPECIES BY PCR: DETECTED
STREPTOCOCCUS AGALACTIAE BY PCR: NOT DETECTED
STREPTOCOCCUS PNEUMONIAE BY PCR: NOT DETECTED
STREPTOCOCCUS PYOGENES  BY PCR: NOT DETECTED
STREPTOCOCCUS SPECIES BY PCR: NOT DETECTED
T4 FREE: 1.52 NG/DL (ref 0.93–1.7)
TOTAL IRON BINDING CAPACITY: 119 MCG/DL (ref 250–450)
TROPONIN: 0.2 NG/ML (ref 0–0.03)
TROPONIN: 0.21 NG/ML (ref 0–0.03)
TSH SERPL DL<=0.05 MIU/L-ACNC: 1.53 UIU/ML (ref 0.27–4.2)
VITAMIN B-12: 1014 PG/ML (ref 211–946)
WBC # BLD: 3.3 E9/L (ref 4.5–11.5)
WBC # BLD: 4.7 E9/L (ref 4.5–11.5)
WBC # BLD: 5.6 E9/L (ref 4.5–11.5)
WBC # BLD: 5.7 E9/L (ref 4.5–11.5)
WBC # BLD: 7.2 E9/L (ref 4.5–11.5)

## 2020-01-01 PROCEDURE — 99215 OFFICE O/P EST HI 40 MIN: CPT | Performed by: INTERNAL MEDICINE

## 2020-01-01 PROCEDURE — 3017F COLORECTAL CA SCREEN DOC REV: CPT | Performed by: NURSE PRACTITIONER

## 2020-01-01 PROCEDURE — 36415 COLL VENOUS BLD VENIPUNCTURE: CPT

## 2020-01-01 PROCEDURE — 2140000000 HC CCU INTERMEDIATE R&B

## 2020-01-01 PROCEDURE — 6370000000 HC RX 637 (ALT 250 FOR IP): Performed by: FAMILY MEDICINE

## 2020-01-01 PROCEDURE — 85025 COMPLETE CBC W/AUTO DIFF WBC: CPT

## 2020-01-01 PROCEDURE — 97530 THERAPEUTIC ACTIVITIES: CPT

## 2020-01-01 PROCEDURE — 3600000013 HC SURGERY LEVEL 3 ADDTL 15MIN: Performed by: SURGERY

## 2020-01-01 PROCEDURE — G8427 DOCREV CUR MEDS BY ELIG CLIN: HCPCS | Performed by: NURSE PRACTITIONER

## 2020-01-01 PROCEDURE — 82962 GLUCOSE BLOOD TEST: CPT

## 2020-01-01 PROCEDURE — 2580000003 HC RX 258: Performed by: RADIOLOGY

## 2020-01-01 PROCEDURE — 83605 ASSAY OF LACTIC ACID: CPT

## 2020-01-01 PROCEDURE — 85027 COMPLETE CBC AUTOMATED: CPT

## 2020-01-01 PROCEDURE — G8427 DOCREV CUR MEDS BY ELIG CLIN: HCPCS | Performed by: ORTHOPAEDIC SURGERY

## 2020-01-01 PROCEDURE — 83735 ASSAY OF MAGNESIUM: CPT

## 2020-01-01 PROCEDURE — 6370000000 HC RX 637 (ALT 250 FOR IP): Performed by: INTERNAL MEDICINE

## 2020-01-01 PROCEDURE — G0378 HOSPITAL OBSERVATION PER HR: HCPCS

## 2020-01-01 PROCEDURE — 2500000003 HC RX 250 WO HCPCS: Performed by: FAMILY MEDICINE

## 2020-01-01 PROCEDURE — 97162 PT EVAL MOD COMPLEX 30 MIN: CPT

## 2020-01-01 PROCEDURE — 96372 THER/PROPH/DIAG INJ SC/IM: CPT

## 2020-01-01 PROCEDURE — 99202 OFFICE O/P NEW SF 15 MIN: CPT

## 2020-01-01 PROCEDURE — 1111F DSCHRG MED/CURRENT MED MERGE: CPT | Performed by: ORTHOPAEDIC SURGERY

## 2020-01-01 PROCEDURE — 2500000003 HC RX 250 WO HCPCS: Performed by: SURGERY

## 2020-01-01 PROCEDURE — L4350 ANKLE CONTROL ORTHO PRE OTS: HCPCS

## 2020-01-01 PROCEDURE — 73610 X-RAY EXAM OF ANKLE: CPT

## 2020-01-01 PROCEDURE — G8417 CALC BMI ABV UP PARAM F/U: HCPCS | Performed by: NURSE PRACTITIONER

## 2020-01-01 PROCEDURE — 84484 ASSAY OF TROPONIN QUANT: CPT

## 2020-01-01 PROCEDURE — 99214 OFFICE O/P EST MOD 30 MIN: CPT | Performed by: INTERNAL MEDICINE

## 2020-01-01 PROCEDURE — 3700000001 HC ADD 15 MINUTES (ANESTHESIA): Performed by: SURGERY

## 2020-01-01 PROCEDURE — C1768 GRAFT, VASCULAR: HCPCS | Performed by: SURGERY

## 2020-01-01 PROCEDURE — 90935 HEMODIALYSIS ONE EVALUATION: CPT

## 2020-01-01 PROCEDURE — 2500000003 HC RX 250 WO HCPCS: Performed by: NURSE ANESTHETIST, CERTIFIED REGISTERED

## 2020-01-01 PROCEDURE — 6370000000 HC RX 637 (ALT 250 FOR IP): Performed by: EMERGENCY MEDICINE

## 2020-01-01 PROCEDURE — 2709999900 HC NON-CHARGEABLE SUPPLY: Performed by: SURGERY

## 2020-01-01 PROCEDURE — 82728 ASSAY OF FERRITIN: CPT

## 2020-01-01 PROCEDURE — 72125 CT NECK SPINE W/O DYE: CPT

## 2020-01-01 PROCEDURE — 5A1D70Z PERFORMANCE OF URINARY FILTRATION, INTERMITTENT, LESS THAN 6 HOURS PER DAY: ICD-10-PCS | Performed by: INTERNAL MEDICINE

## 2020-01-01 PROCEDURE — 1200000000 HC SEMI PRIVATE

## 2020-01-01 PROCEDURE — 97165 OT EVAL LOW COMPLEX 30 MIN: CPT

## 2020-01-01 PROCEDURE — 2580000003 HC RX 258: Performed by: NURSE ANESTHETIST, CERTIFIED REGISTERED

## 2020-01-01 PROCEDURE — 7100000000 HC PACU RECOVERY - FIRST 15 MIN: Performed by: SURGERY

## 2020-01-01 PROCEDURE — 6360000002 HC RX W HCPCS: Performed by: FAMILY MEDICINE

## 2020-01-01 PROCEDURE — 6360000002 HC RX W HCPCS: Performed by: NURSE PRACTITIONER

## 2020-01-01 PROCEDURE — 99203 OFFICE O/P NEW LOW 30 MIN: CPT | Performed by: ORTHOPAEDIC SURGERY

## 2020-01-01 PROCEDURE — 6370000000 HC RX 637 (ALT 250 FOR IP): Performed by: CLINICAL NURSE SPECIALIST

## 2020-01-01 PROCEDURE — 2580000003 HC RX 258: Performed by: SURGERY

## 2020-01-01 PROCEDURE — 97535 SELF CARE MNGMENT TRAINING: CPT

## 2020-01-01 PROCEDURE — 7100000011 HC PHASE II RECOVERY - ADDTL 15 MIN: Performed by: SURGERY

## 2020-01-01 PROCEDURE — 6360000002 HC RX W HCPCS: Performed by: INTERNAL MEDICINE

## 2020-01-01 PROCEDURE — 93005 ELECTROCARDIOGRAM TRACING: CPT | Performed by: FAMILY MEDICINE

## 2020-01-01 PROCEDURE — 99024 POSTOP FOLLOW-UP VISIT: CPT | Performed by: SURGERY

## 2020-01-01 PROCEDURE — 84443 ASSAY THYROID STIM HORMONE: CPT

## 2020-01-01 PROCEDURE — 36832 AV FISTULA REVISION OPEN: CPT | Performed by: SURGERY

## 2020-01-01 PROCEDURE — 3600000003 HC SURGERY LEVEL 3 BASE: Performed by: SURGERY

## 2020-01-01 PROCEDURE — 80048 BASIC METABOLIC PNL TOTAL CA: CPT

## 2020-01-01 PROCEDURE — G8417 CALC BMI ABV UP PARAM F/U: HCPCS | Performed by: ORTHOPAEDIC SURGERY

## 2020-01-01 PROCEDURE — 2580000003 HC RX 258: Performed by: FAMILY MEDICINE

## 2020-01-01 PROCEDURE — 6360000002 HC RX W HCPCS: Performed by: ANESTHESIOLOGY

## 2020-01-01 PROCEDURE — 83550 IRON BINDING TEST: CPT

## 2020-01-01 PROCEDURE — 87186 SC STD MICRODIL/AGAR DIL: CPT

## 2020-01-01 PROCEDURE — 6360000002 HC RX W HCPCS: Performed by: SURGERY

## 2020-01-01 PROCEDURE — 93306 TTE W/DOPPLER COMPLETE: CPT

## 2020-01-01 PROCEDURE — 82746 ASSAY OF FOLIC ACID SERUM: CPT

## 2020-01-01 PROCEDURE — 87040 BLOOD CULTURE FOR BACTERIA: CPT

## 2020-01-01 PROCEDURE — 64415 NJX AA&/STRD BRCH PLXS IMG: CPT | Performed by: ANESTHESIOLOGY

## 2020-01-01 PROCEDURE — 96375 TX/PRO/DX INJ NEW DRUG ADDON: CPT

## 2020-01-01 PROCEDURE — 3600000012 HC SURGERY LEVEL 2 ADDTL 15MIN: Performed by: SURGERY

## 2020-01-01 PROCEDURE — 3700000000 HC ANESTHESIA ATTENDED CARE: Performed by: SURGERY

## 2020-01-01 PROCEDURE — APPSS60 APP SPLIT SHARED TIME 46-60 MINUTES: Performed by: CLINICAL NURSE SPECIALIST

## 2020-01-01 PROCEDURE — 85610 PROTHROMBIN TIME: CPT

## 2020-01-01 PROCEDURE — 99245 OFF/OP CONSLTJ NEW/EST HI 55: CPT | Performed by: INTERNAL MEDICINE

## 2020-01-01 PROCEDURE — 2700000000 HC OXYGEN THERAPY PER DAY

## 2020-01-01 PROCEDURE — G0480 DRUG TEST DEF 1-7 CLASSES: HCPCS

## 2020-01-01 PROCEDURE — 96376 TX/PRO/DX INJ SAME DRUG ADON: CPT

## 2020-01-01 PROCEDURE — 70551 MRI BRAIN STEM W/O DYE: CPT

## 2020-01-01 PROCEDURE — 99285 EMERGENCY DEPT VISIT HI MDM: CPT

## 2020-01-01 PROCEDURE — 6360000002 HC RX W HCPCS: Performed by: NURSE ANESTHETIST, CERTIFIED REGISTERED

## 2020-01-01 PROCEDURE — 80069 RENAL FUNCTION PANEL: CPT

## 2020-01-01 PROCEDURE — 88304 TISSUE EXAM BY PATHOLOGIST: CPT

## 2020-01-01 PROCEDURE — 96366 THER/PROPH/DIAG IV INF ADDON: CPT

## 2020-01-01 PROCEDURE — 93000 ELECTROCARDIOGRAM COMPLETE: CPT | Performed by: INTERNAL MEDICINE

## 2020-01-01 PROCEDURE — 85730 THROMBOPLASTIN TIME PARTIAL: CPT

## 2020-01-01 PROCEDURE — 36558 INSERT TUNNELED CV CATH: CPT | Performed by: SURGERY

## 2020-01-01 PROCEDURE — 87077 CULTURE AEROBIC IDENTIFY: CPT

## 2020-01-01 PROCEDURE — 77001 FLUOROGUIDE FOR VEIN DEVICE: CPT | Performed by: SURGERY

## 2020-01-01 PROCEDURE — 73700 CT LOWER EXTREMITY W/O DYE: CPT

## 2020-01-01 PROCEDURE — 70450 CT HEAD/BRAIN W/O DYE: CPT

## 2020-01-01 PROCEDURE — 10140 I&D HMTMA SEROMA/FLUID COLLJ: CPT | Performed by: SURGERY

## 2020-01-01 PROCEDURE — 87150 DNA/RNA AMPLIFIED PROBE: CPT

## 2020-01-01 PROCEDURE — 74177 CT ABD & PELVIS W/CONTRAST: CPT

## 2020-01-01 PROCEDURE — 84100 ASSAY OF PHOSPHORUS: CPT

## 2020-01-01 PROCEDURE — U0003 INFECTIOUS AGENT DETECTION BY NUCLEIC ACID (DNA OR RNA); SEVERE ACUTE RESPIRATORY SYNDROME CORONAVIRUS 2 (SARS-COV-2) (CORONAVIRUS DISEASE [COVID-19]), AMPLIFIED PROBE TECHNIQUE, MAKING USE OF HIGH THROUGHPUT TECHNOLOGIES AS DESCRIBED BY CMS-2020-01-R: HCPCS

## 2020-01-01 PROCEDURE — 93005 ELECTROCARDIOGRAM TRACING: CPT | Performed by: PHYSICIAN ASSISTANT

## 2020-01-01 PROCEDURE — 83540 ASSAY OF IRON: CPT

## 2020-01-01 PROCEDURE — 7100000010 HC PHASE II RECOVERY - FIRST 15 MIN: Performed by: SURGERY

## 2020-01-01 PROCEDURE — 93010 ELECTROCARDIOGRAM REPORT: CPT | Performed by: INTERNAL MEDICINE

## 2020-01-01 PROCEDURE — 3017F COLORECTAL CA SCREEN DOC REV: CPT | Performed by: ORTHOPAEDIC SURGERY

## 2020-01-01 PROCEDURE — 96365 THER/PROPH/DIAG IV INF INIT: CPT

## 2020-01-01 PROCEDURE — 7100000001 HC PACU RECOVERY - ADDTL 15 MIN: Performed by: SURGERY

## 2020-01-01 PROCEDURE — 82607 VITAMIN B-12: CPT

## 2020-01-01 PROCEDURE — 83880 ASSAY OF NATRIURETIC PEPTIDE: CPT

## 2020-01-01 PROCEDURE — 1036F TOBACCO NON-USER: CPT | Performed by: ORTHOPAEDIC SURGERY

## 2020-01-01 PROCEDURE — 3600000002 HC SURGERY LEVEL 2 BASE: Performed by: SURGERY

## 2020-01-01 PROCEDURE — 84145 PROCALCITONIN (PCT): CPT

## 2020-01-01 PROCEDURE — 6360000004 HC RX CONTRAST MEDICATION: Performed by: RADIOLOGY

## 2020-01-01 PROCEDURE — 99213 OFFICE O/P EST LOW 20 MIN: CPT | Performed by: NURSE PRACTITIONER

## 2020-01-01 PROCEDURE — 71045 X-RAY EXAM CHEST 1 VIEW: CPT

## 2020-01-01 PROCEDURE — 1036F TOBACCO NON-USER: CPT | Performed by: NURSE PRACTITIONER

## 2020-01-01 PROCEDURE — 71260 CT THORAX DX C+: CPT

## 2020-01-01 PROCEDURE — U0002 COVID-19 LAB TEST NON-CDC: HCPCS

## 2020-01-01 PROCEDURE — 99024 POSTOP FOLLOW-UP VISIT: CPT | Performed by: PHYSICIAN ASSISTANT

## 2020-01-01 PROCEDURE — 3209999900 FLUORO FOR SURGICAL PROCEDURES

## 2020-01-01 PROCEDURE — 84439 ASSAY OF FREE THYROXINE: CPT

## 2020-01-01 PROCEDURE — 99244 OFF/OP CNSLTJ NEW/EST MOD 40: CPT | Performed by: STUDENT IN AN ORGANIZED HEALTH CARE EDUCATION/TRAINING PROGRAM

## 2020-01-01 PROCEDURE — 6370000000 HC RX 637 (ALT 250 FOR IP): Performed by: ANESTHESIOLOGY

## 2020-01-01 PROCEDURE — G8484 FLU IMMUNIZE NO ADMIN: HCPCS | Performed by: NURSE PRACTITIONER

## 2020-01-01 DEVICE — GRAFT VASC L30CM ID6MM BOV CAR ART CLLGN FOR FUNC HAD ACCS: Type: IMPLANTABLE DEVICE | Site: ARM | Status: FUNCTIONAL

## 2020-01-01 RX ORDER — DEXAMETHASONE SODIUM PHOSPHATE 4 MG/ML
4 INJECTION, SOLUTION INTRA-ARTICULAR; INTRALESIONAL; INTRAMUSCULAR; INTRAVENOUS; SOFT TISSUE EVERY 6 HOURS
Status: DISCONTINUED | OUTPATIENT
Start: 2020-01-01 | End: 2020-01-01

## 2020-01-01 RX ORDER — SODIUM CHLORIDE 9 MG/ML
INJECTION, SOLUTION INTRAVENOUS CONTINUOUS
Status: DISCONTINUED | OUTPATIENT
Start: 2020-01-01 | End: 2020-01-01 | Stop reason: HOSPADM

## 2020-01-01 RX ORDER — SODIUM CHLORIDE 9 MG/ML
INJECTION, SOLUTION INTRAVENOUS CONTINUOUS
Status: CANCELLED | OUTPATIENT
Start: 2020-01-01

## 2020-01-01 RX ORDER — MEPERIDINE HYDROCHLORIDE 25 MG/ML
12.5 INJECTION INTRAMUSCULAR; INTRAVENOUS; SUBCUTANEOUS EVERY 5 MIN PRN
Status: DISCONTINUED | OUTPATIENT
Start: 2020-01-01 | End: 2020-01-01 | Stop reason: HOSPADM

## 2020-01-01 RX ORDER — DIPHENHYDRAMINE HYDROCHLORIDE 50 MG/ML
25 INJECTION INTRAMUSCULAR; INTRAVENOUS ONCE
Status: COMPLETED | OUTPATIENT
Start: 2020-01-01 | End: 2020-01-01

## 2020-01-01 RX ORDER — PROMETHAZINE HYDROCHLORIDE 25 MG/ML
6.25 INJECTION, SOLUTION INTRAMUSCULAR; INTRAVENOUS
Status: DISCONTINUED | OUTPATIENT
Start: 2020-01-01 | End: 2020-01-01 | Stop reason: HOSPADM

## 2020-01-01 RX ORDER — DIPHENHYDRAMINE HYDROCHLORIDE 50 MG/ML
12.5 INJECTION INTRAMUSCULAR; INTRAVENOUS
Status: COMPLETED | OUTPATIENT
Start: 2020-01-01 | End: 2020-01-01

## 2020-01-01 RX ORDER — DRONABINOL 2.5 MG/1
5 CAPSULE ORAL DAILY
Status: DISCONTINUED | OUTPATIENT
Start: 2020-01-01 | End: 2020-01-01 | Stop reason: HOSPADM

## 2020-01-01 RX ORDER — POLYETHYLENE GLYCOL 3350 17 G/17G
17 POWDER, FOR SOLUTION ORAL DAILY PRN
Status: DISCONTINUED | OUTPATIENT
Start: 2020-01-01 | End: 2020-01-01 | Stop reason: HOSPADM

## 2020-01-01 RX ORDER — PROMETHAZINE HYDROCHLORIDE 25 MG/1
12.5 TABLET ORAL EVERY 6 HOURS PRN
Status: DISCONTINUED | OUTPATIENT
Start: 2020-01-01 | End: 2020-01-01 | Stop reason: HOSPADM

## 2020-01-01 RX ORDER — SODIUM CHLORIDE 0.9 % (FLUSH) 0.9 %
10 SYRINGE (ML) INJECTION ONCE
Status: COMPLETED | OUTPATIENT
Start: 2020-01-01 | End: 2020-01-01

## 2020-01-01 RX ORDER — FENTANYL CITRATE 50 UG/ML
INJECTION, SOLUTION INTRAMUSCULAR; INTRAVENOUS PRN
Status: DISCONTINUED | OUTPATIENT
Start: 2020-01-01 | End: 2020-01-01 | Stop reason: SDUPTHER

## 2020-01-01 RX ORDER — SODIUM CHLORIDE 0.9 % (FLUSH) 0.9 %
10 SYRINGE (ML) INJECTION PRN
Status: DISCONTINUED | OUTPATIENT
Start: 2020-01-01 | End: 2020-01-01 | Stop reason: HOSPADM

## 2020-01-01 RX ORDER — CEFDINIR 300 MG/1
300 CAPSULE ORAL 2 TIMES DAILY
Qty: 14 CAPSULE | Refills: 0 | Status: SHIPPED | OUTPATIENT
Start: 2020-01-01 | End: 2020-01-01 | Stop reason: HOSPADM

## 2020-01-01 RX ORDER — LANOLIN ALCOHOL/MO/W.PET/CERES
3 CREAM (GRAM) TOPICAL NIGHTLY PRN
Status: DISCONTINUED | OUTPATIENT
Start: 2020-01-01 | End: 2020-01-01 | Stop reason: HOSPADM

## 2020-01-01 RX ORDER — ONDANSETRON 2 MG/ML
4 INJECTION INTRAMUSCULAR; INTRAVENOUS EVERY 8 HOURS PRN
Status: DISCONTINUED | OUTPATIENT
Start: 2020-01-01 | End: 2020-01-01 | Stop reason: HOSPADM

## 2020-01-01 RX ORDER — ONDANSETRON 2 MG/ML
4 INJECTION INTRAMUSCULAR; INTRAVENOUS EVERY 6 HOURS PRN
Status: DISCONTINUED | OUTPATIENT
Start: 2020-01-01 | End: 2020-01-01 | Stop reason: HOSPADM

## 2020-01-01 RX ORDER — ROPIVACAINE HYDROCHLORIDE 5 MG/ML
20 INJECTION, SOLUTION EPIDURAL; INFILTRATION; PERINEURAL ONCE
Status: COMPLETED | OUTPATIENT
Start: 2020-01-01 | End: 2020-01-01

## 2020-01-01 RX ORDER — SODIUM CHLORIDE 0.9 % (FLUSH) 0.9 %
10 SYRINGE (ML) INJECTION EVERY 12 HOURS SCHEDULED
Status: DISCONTINUED | OUTPATIENT
Start: 2020-01-01 | End: 2020-01-01 | Stop reason: HOSPADM

## 2020-01-01 RX ORDER — SODIUM CHLORIDE 0.9 % (FLUSH) 0.9 %
10 SYRINGE (ML) INJECTION PRN
Status: DISCONTINUED | OUTPATIENT
Start: 2020-01-01 | End: 2020-01-01 | Stop reason: SDUPTHER

## 2020-01-01 RX ORDER — LABETALOL HYDROCHLORIDE 5 MG/ML
INJECTION, SOLUTION INTRAVENOUS PRN
Status: DISCONTINUED | OUTPATIENT
Start: 2020-01-01 | End: 2020-01-01 | Stop reason: SDUPTHER

## 2020-01-01 RX ORDER — ROPIVACAINE HYDROCHLORIDE 5 MG/ML
INJECTION, SOLUTION EPIDURAL; INFILTRATION; PERINEURAL
Status: COMPLETED | OUTPATIENT
Start: 2020-01-01 | End: 2020-01-01

## 2020-01-01 RX ORDER — MIDAZOLAM HYDROCHLORIDE 1 MG/ML
2 INJECTION INTRAMUSCULAR; INTRAVENOUS
Status: COMPLETED | OUTPATIENT
Start: 2020-01-01 | End: 2020-01-01

## 2020-01-01 RX ORDER — HYDRALAZINE HYDROCHLORIDE 20 MG/ML
INJECTION INTRAMUSCULAR; INTRAVENOUS PRN
Status: DISCONTINUED | OUTPATIENT
Start: 2020-01-01 | End: 2020-01-01 | Stop reason: SDUPTHER

## 2020-01-01 RX ORDER — HYDROCODONE BITARTRATE AND ACETAMINOPHEN 5; 325 MG/1; MG/1
1 TABLET ORAL EVERY 6 HOURS PRN
Qty: 12 TABLET | Refills: 0 | Status: SHIPPED | OUTPATIENT
Start: 2020-01-01 | End: 2020-01-01 | Stop reason: SDUPTHER

## 2020-01-01 RX ORDER — OXYCODONE HYDROCHLORIDE AND ACETAMINOPHEN 5; 325 MG/1; MG/1
1 TABLET ORAL EVERY 4 HOURS PRN
Status: DISCONTINUED | OUTPATIENT
Start: 2020-01-01 | End: 2020-01-01 | Stop reason: HOSPADM

## 2020-01-01 RX ORDER — METOPROLOL SUCCINATE 50 MG/1
50 TABLET, EXTENDED RELEASE ORAL DAILY
Status: DISCONTINUED | OUTPATIENT
Start: 2020-01-01 | End: 2020-01-01 | Stop reason: HOSPADM

## 2020-01-01 RX ORDER — MIDAZOLAM HYDROCHLORIDE 1 MG/ML
INJECTION INTRAMUSCULAR; INTRAVENOUS PRN
Status: DISCONTINUED | OUTPATIENT
Start: 2020-01-01 | End: 2020-01-01 | Stop reason: SDUPTHER

## 2020-01-01 RX ORDER — KETAMINE HCL IN NACL, ISO-OSM 100MG/10ML
SYRINGE (ML) INJECTION PRN
Status: DISCONTINUED | OUTPATIENT
Start: 2020-01-01 | End: 2020-01-01 | Stop reason: SDUPTHER

## 2020-01-01 RX ORDER — PROTAMINE SULFATE 10 MG/ML
INJECTION, SOLUTION INTRAVENOUS PRN
Status: DISCONTINUED | OUTPATIENT
Start: 2020-01-01 | End: 2020-01-01 | Stop reason: SDUPTHER

## 2020-01-01 RX ORDER — SODIUM CHLORIDE 0.9 % (FLUSH) 0.9 %
10 SYRINGE (ML) INJECTION PRN
Status: CANCELLED | OUTPATIENT
Start: 2020-01-01

## 2020-01-01 RX ORDER — DOXYCYCLINE HYCLATE 100 MG
100 TABLET ORAL 2 TIMES DAILY
Qty: 14 TABLET | Refills: 0 | Status: SHIPPED | OUTPATIENT
Start: 2020-01-01 | End: 2020-01-01 | Stop reason: HOSPADM

## 2020-01-01 RX ORDER — OXYCODONE HYDROCHLORIDE AND ACETAMINOPHEN 5; 325 MG/1; MG/1
1 TABLET ORAL EVERY 6 HOURS PRN
Qty: 28 TABLET | Refills: 0 | Status: SHIPPED | OUTPATIENT
Start: 2020-01-01 | End: 2020-01-01

## 2020-01-01 RX ORDER — HYDRALAZINE HYDROCHLORIDE 25 MG/1
50 TABLET, FILM COATED ORAL EVERY 8 HOURS SCHEDULED
Status: DISCONTINUED | OUTPATIENT
Start: 2020-01-01 | End: 2020-01-01 | Stop reason: HOSPADM

## 2020-01-01 RX ORDER — PROPOFOL 10 MG/ML
INJECTION, EMULSION INTRAVENOUS CONTINUOUS PRN
Status: DISCONTINUED | OUTPATIENT
Start: 2020-01-01 | End: 2020-01-01 | Stop reason: SDUPTHER

## 2020-01-01 RX ORDER — FENTANYL CITRATE 50 UG/ML
50 INJECTION, SOLUTION INTRAMUSCULAR; INTRAVENOUS EVERY 5 MIN PRN
Status: DISCONTINUED | OUTPATIENT
Start: 2020-01-01 | End: 2020-01-01 | Stop reason: HOSPADM

## 2020-01-01 RX ORDER — DOXYCYCLINE HYCLATE 100 MG
100 TABLET ORAL 2 TIMES DAILY
Qty: 14 TABLET | Refills: 0 | Status: SHIPPED | OUTPATIENT
Start: 2020-01-01 | End: 2020-01-01 | Stop reason: SDUPTHER

## 2020-01-01 RX ORDER — CEFDINIR 300 MG/1
300 CAPSULE ORAL DAILY
Qty: 7 CAPSULE | Refills: 0 | Status: SHIPPED | OUTPATIENT
Start: 2020-01-01 | End: 2020-01-01 | Stop reason: SDUPTHER

## 2020-01-01 RX ORDER — ACETAMINOPHEN 325 MG/1
650 TABLET ORAL EVERY 4 HOURS PRN
Status: DISCONTINUED | OUTPATIENT
Start: 2020-01-01 | End: 2020-01-01 | Stop reason: HOSPADM

## 2020-01-01 RX ORDER — OXYCODONE HYDROCHLORIDE AND ACETAMINOPHEN 5; 325 MG/1; MG/1
2 TABLET ORAL EVERY 4 HOURS PRN
Status: DISCONTINUED | OUTPATIENT
Start: 2020-01-01 | End: 2020-01-01 | Stop reason: HOSPADM

## 2020-01-01 RX ORDER — HYDRALAZINE HYDROCHLORIDE 20 MG/ML
10 INJECTION INTRAMUSCULAR; INTRAVENOUS EVERY 4 HOURS PRN
Status: DISCONTINUED | OUTPATIENT
Start: 2020-01-01 | End: 2020-01-01 | Stop reason: HOSPADM

## 2020-01-01 RX ORDER — POTASSIUM CHLORIDE 20 MEQ/1
20 TABLET, EXTENDED RELEASE ORAL ONCE
Status: COMPLETED | OUTPATIENT
Start: 2020-01-01 | End: 2020-01-01

## 2020-01-01 RX ORDER — METOPROLOL SUCCINATE 25 MG/1
25 TABLET, EXTENDED RELEASE ORAL 2 TIMES DAILY
Status: DISCONTINUED | OUTPATIENT
Start: 2020-01-01 | End: 2020-01-01

## 2020-01-01 RX ORDER — ACETAMINOPHEN, DIPHENHYDRAMINE HCL, PHENYLEPHRINE HCL 325; 25; 5 MG/1; MG/1; MG/1
TABLET ORAL
COMMUNITY
Start: 2020-01-01 | End: 2020-01-01 | Stop reason: ALTCHOICE

## 2020-01-01 RX ORDER — HEPARIN SODIUM 10000 [USP'U]/ML
5000 INJECTION, SOLUTION INTRAVENOUS; SUBCUTANEOUS EVERY 8 HOURS
Status: DISCONTINUED | OUTPATIENT
Start: 2020-01-01 | End: 2020-01-01 | Stop reason: HOSPADM

## 2020-01-01 RX ORDER — ACETAMINOPHEN 650 MG/1
650 SUPPOSITORY RECTAL EVERY 6 HOURS PRN
Status: DISCONTINUED | OUTPATIENT
Start: 2020-01-01 | End: 2020-01-01 | Stop reason: HOSPADM

## 2020-01-01 RX ORDER — METOPROLOL SUCCINATE 25 MG/1
25 TABLET, EXTENDED RELEASE ORAL DAILY
Status: DISCONTINUED | OUTPATIENT
Start: 2020-01-01 | End: 2020-01-01

## 2020-01-01 RX ORDER — HYDROCODONE BITARTRATE AND ACETAMINOPHEN 5; 325 MG/1; MG/1
1 TABLET ORAL EVERY 6 HOURS PRN
Qty: 12 TABLET | Refills: 0 | Status: SHIPPED | OUTPATIENT
Start: 2020-01-01 | End: 2020-01-01 | Stop reason: HOSPADM

## 2020-01-01 RX ORDER — HYDRALAZINE HYDROCHLORIDE 25 MG/1
50 TABLET, FILM COATED ORAL ONCE
Status: COMPLETED | OUTPATIENT
Start: 2020-01-01 | End: 2020-01-01

## 2020-01-01 RX ORDER — ISOSORBIDE DINITRATE 10 MG/1
10 TABLET ORAL 3 TIMES DAILY
Status: DISCONTINUED | OUTPATIENT
Start: 2020-01-01 | End: 2020-01-01 | Stop reason: HOSPADM

## 2020-01-01 RX ORDER — LISINOPRIL 5 MG/1
TABLET ORAL
COMMUNITY
Start: 2020-01-01 | End: 2020-01-01

## 2020-01-01 RX ORDER — POTASSIUM CHLORIDE 20 MEQ/1
40 TABLET, EXTENDED RELEASE ORAL ONCE
Status: COMPLETED | OUTPATIENT
Start: 2020-01-01 | End: 2020-01-01

## 2020-01-01 RX ORDER — SODIUM CHLORIDE 0.9 % (FLUSH) 0.9 %
10 SYRINGE (ML) INJECTION EVERY 12 HOURS SCHEDULED
Status: DISCONTINUED | OUTPATIENT
Start: 2020-01-01 | End: 2020-01-01 | Stop reason: SDUPTHER

## 2020-01-01 RX ORDER — METOPROLOL SUCCINATE 50 MG/1
50 TABLET, EXTENDED RELEASE ORAL DAILY
Qty: 30 TABLET | Refills: 3 | Status: SHIPPED | OUTPATIENT
Start: 2020-01-01 | End: 2021-01-01 | Stop reason: SDUPTHER

## 2020-01-01 RX ORDER — SODIUM CHLORIDE 9 MG/ML
INJECTION, SOLUTION INTRAVENOUS CONTINUOUS PRN
Status: DISCONTINUED | OUTPATIENT
Start: 2020-01-01 | End: 2020-01-01 | Stop reason: SDUPTHER

## 2020-01-01 RX ORDER — ISOSORBIDE DINITRATE 10 MG/1
10 TABLET ORAL 3 TIMES DAILY
Qty: 90 TABLET | Refills: 3 | Status: SHIPPED | OUTPATIENT
Start: 2020-01-01 | End: 2021-01-01

## 2020-01-01 RX ORDER — HYDROCODONE BITARTRATE AND ACETAMINOPHEN 5; 325 MG/1; MG/1
1 TABLET ORAL EVERY 6 HOURS PRN
Qty: 12 TABLET | Refills: 0 | Status: SHIPPED | OUTPATIENT
Start: 2020-01-01 | End: 2020-01-01

## 2020-01-01 RX ORDER — METOPROLOL TARTRATE 50 MG/1
TABLET, FILM COATED ORAL
COMMUNITY
Start: 2020-01-01 | End: 2020-01-01

## 2020-01-01 RX ORDER — HEPARIN SODIUM (PORCINE) LOCK FLUSH IV SOLN 100 UNIT/ML 100 UNIT/ML
SOLUTION INTRAVENOUS PRN
Status: DISCONTINUED | OUTPATIENT
Start: 2020-01-01 | End: 2020-01-01 | Stop reason: ALTCHOICE

## 2020-01-01 RX ORDER — LISINOPRIL 20 MG/1
20 TABLET ORAL DAILY
Status: DISCONTINUED | OUTPATIENT
Start: 2020-01-01 | End: 2020-01-01 | Stop reason: HOSPADM

## 2020-01-01 RX ORDER — CALCIUM ACETATE 667 MG/1
2 CAPSULE ORAL 3 TIMES DAILY
Status: DISCONTINUED | OUTPATIENT
Start: 2020-01-01 | End: 2020-01-01 | Stop reason: HOSPADM

## 2020-01-01 RX ORDER — OXYCODONE HYDROCHLORIDE AND ACETAMINOPHEN 5; 325 MG/1; MG/1
1 TABLET ORAL
Status: COMPLETED | OUTPATIENT
Start: 2020-01-01 | End: 2020-01-01

## 2020-01-01 RX ORDER — HEPARIN SODIUM 1000 [USP'U]/ML
INJECTION, SOLUTION INTRAVENOUS; SUBCUTANEOUS PRN
Status: DISCONTINUED | OUTPATIENT
Start: 2020-01-01 | End: 2020-01-01 | Stop reason: SDUPTHER

## 2020-01-01 RX ORDER — SODIUM CHLORIDE 0.9 % (FLUSH) 0.9 %
10 SYRINGE (ML) INJECTION EVERY 12 HOURS SCHEDULED
Status: CANCELLED | OUTPATIENT
Start: 2020-01-01

## 2020-01-01 RX ORDER — CEFAZOLIN SODIUM 1 G/3ML
INJECTION, POWDER, FOR SOLUTION INTRAMUSCULAR; INTRAVENOUS PRN
Status: DISCONTINUED | OUTPATIENT
Start: 2020-01-01 | End: 2020-01-01 | Stop reason: SDUPTHER

## 2020-01-01 RX ORDER — ACETAMINOPHEN 325 MG/1
650 TABLET ORAL EVERY 6 HOURS PRN
Status: DISCONTINUED | OUTPATIENT
Start: 2020-01-01 | End: 2020-01-01 | Stop reason: HOSPADM

## 2020-01-01 RX ORDER — LEVOFLOXACIN 5 MG/ML
750 INJECTION, SOLUTION INTRAVENOUS ONCE
Status: DISCONTINUED | OUTPATIENT
Start: 2020-01-01 | End: 2020-01-01

## 2020-01-01 RX ORDER — DEXAMETHASONE SODIUM PHOSPHATE 4 MG/ML
4 INJECTION, SOLUTION INTRA-ARTICULAR; INTRALESIONAL; INTRAMUSCULAR; INTRAVENOUS; SOFT TISSUE ONCE
Status: DISCONTINUED | OUTPATIENT
Start: 2020-01-01 | End: 2020-01-01

## 2020-01-01 RX ORDER — DEXAMETHASONE SODIUM PHOSPHATE 10 MG/ML
10 INJECTION, SOLUTION INTRAMUSCULAR; INTRAVENOUS ONCE
Status: COMPLETED | OUTPATIENT
Start: 2020-01-01 | End: 2020-01-01

## 2020-01-01 RX ORDER — LIDOCAINE HYDROCHLORIDE 20 MG/ML
INJECTION, SOLUTION EPIDURAL; INFILTRATION; INTRACAUDAL; PERINEURAL PRN
Status: DISCONTINUED | OUTPATIENT
Start: 2020-01-01 | End: 2020-01-01 | Stop reason: SDUPTHER

## 2020-01-01 RX ORDER — CINACALCET 30 MG/1
30 TABLET, FILM COATED ORAL DAILY
Status: DISCONTINUED | OUTPATIENT
Start: 2020-01-01 | End: 2020-01-01 | Stop reason: HOSPADM

## 2020-01-01 RX ORDER — DIPHENHYDRAMINE HYDROCHLORIDE 50 MG/ML
50 INJECTION INTRAMUSCULAR; INTRAVENOUS ONCE
Status: COMPLETED | OUTPATIENT
Start: 2020-01-01 | End: 2020-01-01

## 2020-01-01 RX ORDER — FENTANYL CITRATE 50 UG/ML
25 INJECTION, SOLUTION INTRAMUSCULAR; INTRAVENOUS EVERY 5 MIN PRN
Status: DISCONTINUED | OUTPATIENT
Start: 2020-01-01 | End: 2020-01-01 | Stop reason: HOSPADM

## 2020-01-01 RX ADMIN — CALCIUM ACETATE 1334 MG: 667 CAPSULE ORAL at 08:50

## 2020-01-01 RX ADMIN — METOPROLOL SUCCINATE 50 MG: 50 TABLET, EXTENDED RELEASE ORAL at 09:02

## 2020-01-01 RX ADMIN — ISOSORBIDE DINITRATE 10 MG: 10 TABLET ORAL at 20:49

## 2020-01-01 RX ADMIN — HYDRALAZINE HYDROCHLORIDE 50 MG: 25 TABLET, FILM COATED ORAL at 22:23

## 2020-01-01 RX ADMIN — HEPARIN SODIUM 5000 UNITS: 10000 INJECTION, SOLUTION INTRAVENOUS; SUBCUTANEOUS at 15:18

## 2020-01-01 RX ADMIN — EPOETIN ALFA-EPBX 10000 UNITS: 10000 INJECTION, SOLUTION INTRAVENOUS; SUBCUTANEOUS at 15:00

## 2020-01-01 RX ADMIN — SODIUM CHLORIDE 3 G: 900 INJECTION INTRAVENOUS at 13:30

## 2020-01-01 RX ADMIN — LISINOPRIL 20 MG: 20 TABLET ORAL at 12:25

## 2020-01-01 RX ADMIN — LISINOPRIL 20 MG: 20 TABLET ORAL at 08:49

## 2020-01-01 RX ADMIN — SODIUM CHLORIDE, PRESERVATIVE FREE 10 ML: 5 INJECTION INTRAVENOUS at 09:02

## 2020-01-01 RX ADMIN — CALCIUM ACETATE 1334 MG: 667 CAPSULE ORAL at 09:28

## 2020-01-01 RX ADMIN — SODIUM CHLORIDE, PRESERVATIVE FREE 10 ML: 5 INJECTION INTRAVENOUS at 08:50

## 2020-01-01 RX ADMIN — HYDRALAZINE HYDROCHLORIDE 50 MG: 25 TABLET, FILM COATED ORAL at 14:19

## 2020-01-01 RX ADMIN — CINACALCET HYDROCHLORIDE 30 MG: 30 TABLET, FILM COATED ORAL at 08:50

## 2020-01-01 RX ADMIN — HYDRALAZINE HYDROCHLORIDE 50 MG: 25 TABLET, FILM COATED ORAL at 20:49

## 2020-01-01 RX ADMIN — MELATONIN 3 MG ORAL TABLET 3 MG: 3 TABLET ORAL at 02:01

## 2020-01-01 RX ADMIN — SODIUM CHLORIDE: 9 INJECTION, SOLUTION INTRAVENOUS at 07:26

## 2020-01-01 RX ADMIN — CEFAZOLIN 2000 MG: 1 INJECTION, POWDER, FOR SOLUTION INTRAMUSCULAR; INTRAVENOUS at 08:25

## 2020-01-01 RX ADMIN — HEPARIN SODIUM 5000 UNITS: 10000 INJECTION, SOLUTION INTRAVENOUS; SUBCUTANEOUS at 14:19

## 2020-01-01 RX ADMIN — METOPROLOL SUCCINATE 50 MG: 50 TABLET, EXTENDED RELEASE ORAL at 12:25

## 2020-01-01 RX ADMIN — SODIUM CHLORIDE, PRESERVATIVE FREE 10 ML: 5 INJECTION INTRAVENOUS at 09:28

## 2020-01-01 RX ADMIN — ONDANSETRON 4 MG: 2 INJECTION INTRAMUSCULAR; INTRAVENOUS at 06:00

## 2020-01-01 RX ADMIN — ISOSORBIDE DINITRATE 10 MG: 10 TABLET ORAL at 09:02

## 2020-01-01 RX ADMIN — FENTANYL CITRATE 50 MCG: 50 INJECTION, SOLUTION INTRAMUSCULAR; INTRAVENOUS at 08:33

## 2020-01-01 RX ADMIN — CINACALCET HYDROCHLORIDE 30 MG: 30 TABLET, FILM COATED ORAL at 12:25

## 2020-01-01 RX ADMIN — PROPOFOL 100 MCG/KG/MIN: 10 INJECTION, EMULSION INTRAVENOUS at 08:21

## 2020-01-01 RX ADMIN — HEPARIN SODIUM 5000 UNITS: 10000 INJECTION, SOLUTION INTRAVENOUS; SUBCUTANEOUS at 21:14

## 2020-01-01 RX ADMIN — DRONABINOL 5 MG: 2.5 CAPSULE ORAL at 09:07

## 2020-01-01 RX ADMIN — MELATONIN 3 MG ORAL TABLET 3 MG: 3 TABLET ORAL at 21:14

## 2020-01-01 RX ADMIN — HEPARIN SODIUM 5000 UNITS: 10000 INJECTION, SOLUTION INTRAVENOUS; SUBCUTANEOUS at 20:50

## 2020-01-01 RX ADMIN — LABETALOL HYDROCHLORIDE 2.5 MG: 5 INJECTION INTRAVENOUS at 13:41

## 2020-01-01 RX ADMIN — SODIUM CHLORIDE 3 G: 900 INJECTION INTRAVENOUS at 01:23

## 2020-01-01 RX ADMIN — POTASSIUM CHLORIDE 20 MEQ: 1500 TABLET, EXTENDED RELEASE ORAL at 05:35

## 2020-01-01 RX ADMIN — LIDOCAINE HYDROCHLORIDE 40 MG: 20 INJECTION, SOLUTION EPIDURAL; INFILTRATION; INTRACAUDAL; PERINEURAL at 12:08

## 2020-01-01 RX ADMIN — HYDRALAZINE HYDROCHLORIDE 50 MG: 25 TABLET, FILM COATED ORAL at 13:31

## 2020-01-01 RX ADMIN — FENTANYL CITRATE 50 MCG: 50 INJECTION, SOLUTION INTRAMUSCULAR; INTRAVENOUS at 12:14

## 2020-01-01 RX ADMIN — ISOSORBIDE DINITRATE 10 MG: 10 TABLET ORAL at 13:31

## 2020-01-01 RX ADMIN — ROPIVACAINE HYDROCHLORIDE 25 ML: 5 INJECTION, SOLUTION EPIDURAL; INFILTRATION; PERINEURAL at 11:09

## 2020-01-01 RX ADMIN — METOPROLOL SUCCINATE 50 MG: 50 TABLET, EXTENDED RELEASE ORAL at 09:28

## 2020-01-01 RX ADMIN — ISOSORBIDE DINITRATE 10 MG: 10 TABLET ORAL at 15:18

## 2020-01-01 RX ADMIN — Medication 2 G: at 12:11

## 2020-01-01 RX ADMIN — MIDAZOLAM 1 MG: 1 INJECTION INTRAMUSCULAR; INTRAVENOUS at 08:21

## 2020-01-01 RX ADMIN — CINACALCET HYDROCHLORIDE 30 MG: 30 TABLET, FILM COATED ORAL at 13:29

## 2020-01-01 RX ADMIN — CALCIUM ACETATE 1334 MG: 667 CAPSULE ORAL at 17:09

## 2020-01-01 RX ADMIN — HYDRALAZINE HYDROCHLORIDE 50 MG: 25 TABLET, FILM COATED ORAL at 05:35

## 2020-01-01 RX ADMIN — SODIUM CHLORIDE, PRESERVATIVE FREE 10 ML: 5 INJECTION INTRAVENOUS at 21:11

## 2020-01-01 RX ADMIN — ISOSORBIDE DINITRATE 10 MG: 10 TABLET ORAL at 09:28

## 2020-01-01 RX ADMIN — METOPROLOL SUCCINATE 25 MG: 25 TABLET, EXTENDED RELEASE ORAL at 21:10

## 2020-01-01 RX ADMIN — HEPARIN SODIUM 5000 UNITS: 10000 INJECTION, SOLUTION INTRAVENOUS; SUBCUTANEOUS at 05:50

## 2020-01-01 RX ADMIN — MELATONIN 3 MG ORAL TABLET 3 MG: 3 TABLET ORAL at 21:12

## 2020-01-01 RX ADMIN — LISINOPRIL 20 MG: 20 TABLET ORAL at 10:02

## 2020-01-01 RX ADMIN — ROPIVACAINE HYDROCHLORIDE 25 ML: 5 INJECTION EPIDURAL; INFILTRATION; PERINEURAL at 11:20

## 2020-01-01 RX ADMIN — HYDRALAZINE HYDROCHLORIDE 10 MG: 20 INJECTION, SOLUTION INTRAMUSCULAR; INTRAVENOUS at 10:13

## 2020-01-01 RX ADMIN — LISINOPRIL 20 MG: 20 TABLET ORAL at 13:29

## 2020-01-01 RX ADMIN — HEPARIN SODIUM 5000 UNITS: 10000 INJECTION, SOLUTION INTRAVENOUS; SUBCUTANEOUS at 06:13

## 2020-01-01 RX ADMIN — HEPARIN SODIUM 5000 UNITS: 1000 INJECTION INTRAVENOUS; SUBCUTANEOUS at 13:16

## 2020-01-01 RX ADMIN — Medication 10 MG: at 08:47

## 2020-01-01 RX ADMIN — CALCIUM ACETATE 1334 MG: 667 CAPSULE ORAL at 09:02

## 2020-01-01 RX ADMIN — DIPHENHYDRAMINE HYDROCHLORIDE 50 MG: 50 INJECTION, SOLUTION INTRAMUSCULAR; INTRAVENOUS at 08:50

## 2020-01-01 RX ADMIN — CALCIUM ACETATE 1334 MG: 667 CAPSULE ORAL at 13:31

## 2020-01-01 RX ADMIN — FENTANYL CITRATE 50 MCG: 50 INJECTION, SOLUTION INTRAMUSCULAR; INTRAVENOUS at 10:25

## 2020-01-01 RX ADMIN — Medication 20 MG: at 08:33

## 2020-01-01 RX ADMIN — DIPHENHYDRAMINE HYDROCHLORIDE 25 MG: 50 INJECTION, SOLUTION INTRAMUSCULAR; INTRAVENOUS at 09:11

## 2020-01-01 RX ADMIN — CALCIUM ACETATE 1334 MG: 667 CAPSULE ORAL at 11:24

## 2020-01-01 RX ADMIN — CALCIUM ACETATE 1334 MG: 667 CAPSULE ORAL at 13:29

## 2020-01-01 RX ADMIN — CINACALCET HYDROCHLORIDE 30 MG: 30 TABLET, FILM COATED ORAL at 09:28

## 2020-01-01 RX ADMIN — HEPARIN SODIUM 5000 UNITS: 10000 INJECTION, SOLUTION INTRAVENOUS; SUBCUTANEOUS at 15:30

## 2020-01-01 RX ADMIN — LABETALOL HYDROCHLORIDE 2.5 MG: 5 INJECTION INTRAVENOUS at 12:47

## 2020-01-01 RX ADMIN — ONDANSETRON 4 MG: 2 INJECTION INTRAMUSCULAR; INTRAVENOUS at 19:38

## 2020-01-01 RX ADMIN — IOPAMIDOL 110 ML: 755 INJECTION, SOLUTION INTRAVENOUS at 17:53

## 2020-01-01 RX ADMIN — HEPARIN SODIUM 5000 UNITS: 10000 INJECTION, SOLUTION INTRAVENOUS; SUBCUTANEOUS at 21:09

## 2020-01-01 RX ADMIN — HEPARIN SODIUM 5000 UNITS: 10000 INJECTION, SOLUTION INTRAVENOUS; SUBCUTANEOUS at 05:28

## 2020-01-01 RX ADMIN — CALCIUM ACETATE 1334 MG: 667 CAPSULE ORAL at 16:31

## 2020-01-01 RX ADMIN — SODIUM CHLORIDE, PRESERVATIVE FREE 10 ML: 5 INJECTION INTRAVENOUS at 05:36

## 2020-01-01 RX ADMIN — DRONABINOL 5 MG: 2.5 CAPSULE ORAL at 08:50

## 2020-01-01 RX ADMIN — SODIUM CHLORIDE, PRESERVATIVE FREE 10 ML: 5 INJECTION INTRAVENOUS at 21:45

## 2020-01-01 RX ADMIN — MIDAZOLAM 2 MG: 1 INJECTION INTRAMUSCULAR; INTRAVENOUS at 11:07

## 2020-01-01 RX ADMIN — DEXAMETHASONE SODIUM PHOSPHATE 10 MG: 10 INJECTION, SOLUTION INTRAMUSCULAR; INTRAVENOUS at 11:09

## 2020-01-01 RX ADMIN — LABETALOL HYDROCHLORIDE 2.5 MG: 5 INJECTION INTRAVENOUS at 13:48

## 2020-01-01 RX ADMIN — ONDANSETRON 4 MG: 2 INJECTION INTRAMUSCULAR; INTRAVENOUS at 17:19

## 2020-01-01 RX ADMIN — METOPROLOL SUCCINATE 25 MG: 25 TABLET, EXTENDED RELEASE ORAL at 08:50

## 2020-01-01 RX ADMIN — ONDANSETRON 4 MG: 2 INJECTION INTRAMUSCULAR; INTRAVENOUS at 17:09

## 2020-01-01 RX ADMIN — CALCIUM ACETATE 1334 MG: 667 CAPSULE ORAL at 16:50

## 2020-01-01 RX ADMIN — DRONABINOL 5 MG: 2.5 CAPSULE ORAL at 09:27

## 2020-01-01 RX ADMIN — HYDRALAZINE HYDROCHLORIDE 5 MG: 20 INJECTION INTRAMUSCULAR; INTRAVENOUS at 08:31

## 2020-01-01 RX ADMIN — HYDRALAZINE HYDROCHLORIDE 50 MG: 25 TABLET, FILM COATED ORAL at 05:28

## 2020-01-01 RX ADMIN — LISINOPRIL 20 MG: 20 TABLET ORAL at 09:28

## 2020-01-01 RX ADMIN — METOPROLOL SUCCINATE 25 MG: 25 TABLET, EXTENDED RELEASE ORAL at 13:29

## 2020-01-01 RX ADMIN — HYDRALAZINE HYDROCHLORIDE 50 MG: 25 TABLET, FILM COATED ORAL at 15:01

## 2020-01-01 RX ADMIN — MIDAZOLAM 1 MG: 1 INJECTION INTRAMUSCULAR; INTRAVENOUS at 08:24

## 2020-01-01 RX ADMIN — FENTANYL CITRATE 25 MCG: 50 INJECTION, SOLUTION INTRAMUSCULAR; INTRAVENOUS at 08:54

## 2020-01-01 RX ADMIN — CINACALCET HYDROCHLORIDE 30 MG: 30 TABLET, FILM COATED ORAL at 10:02

## 2020-01-01 RX ADMIN — HYDRALAZINE HYDROCHLORIDE 50 MG: 25 TABLET, FILM COATED ORAL at 15:18

## 2020-01-01 RX ADMIN — OXYCODONE AND ACETAMINOPHEN 1 TABLET: 5; 325 TABLET ORAL at 11:20

## 2020-01-01 RX ADMIN — HYDRALAZINE HYDROCHLORIDE 50 MG: 25 TABLET, FILM COATED ORAL at 21:14

## 2020-01-01 RX ADMIN — ISOSORBIDE DINITRATE 10 MG: 10 TABLET ORAL at 14:19

## 2020-01-01 RX ADMIN — Medication 10 MG: at 09:17

## 2020-01-01 RX ADMIN — HYDRALAZINE HYDROCHLORIDE 50 MG: 25 TABLET, FILM COATED ORAL at 21:45

## 2020-01-01 RX ADMIN — POTASSIUM CHLORIDE 40 MEQ: 1500 TABLET, EXTENDED RELEASE ORAL at 11:24

## 2020-01-01 RX ADMIN — FENTANYL CITRATE 50 MCG: 50 INJECTION, SOLUTION INTRAMUSCULAR; INTRAVENOUS at 12:37

## 2020-01-01 RX ADMIN — SODIUM CHLORIDE, PRESERVATIVE FREE 10 ML: 5 INJECTION INTRAVENOUS at 12:26

## 2020-01-01 RX ADMIN — LABETALOL HYDROCHLORIDE 2.5 MG: 5 INJECTION INTRAVENOUS at 13:16

## 2020-01-01 RX ADMIN — ISOSORBIDE DINITRATE 10 MG: 10 TABLET ORAL at 21:15

## 2020-01-01 RX ADMIN — HYDRALAZINE HYDROCHLORIDE 50 MG: 25 TABLET, FILM COATED ORAL at 05:59

## 2020-01-01 RX ADMIN — FENTANYL CITRATE 50 MCG: 50 INJECTION, SOLUTION INTRAMUSCULAR; INTRAVENOUS at 10:30

## 2020-01-01 RX ADMIN — SODIUM CHLORIDE: 9 INJECTION, SOLUTION INTRAVENOUS at 12:03

## 2020-01-01 RX ADMIN — HYDRALAZINE HYDROCHLORIDE 5 MG: 20 INJECTION INTRAMUSCULAR; INTRAVENOUS at 08:41

## 2020-01-01 RX ADMIN — HYDRALAZINE HYDROCHLORIDE 50 MG: 25 TABLET, FILM COATED ORAL at 18:01

## 2020-01-01 RX ADMIN — PROPOFOL 150 MCG/KG/MIN: 10 INJECTION, EMULSION INTRAVENOUS at 12:08

## 2020-01-01 RX ADMIN — HEPARIN SODIUM 5000 UNITS: 10000 INJECTION, SOLUTION INTRAVENOUS; SUBCUTANEOUS at 15:01

## 2020-01-01 RX ADMIN — FENTANYL CITRATE 25 MCG: 50 INJECTION, SOLUTION INTRAMUSCULAR; INTRAVENOUS at 09:07

## 2020-01-01 RX ADMIN — Medication 10 MG: at 09:04

## 2020-01-01 RX ADMIN — DRONABINOL 5 MG: 2.5 CAPSULE ORAL at 13:28

## 2020-01-01 RX ADMIN — HYDRALAZINE HYDROCHLORIDE 50 MG: 25 TABLET, FILM COATED ORAL at 06:13

## 2020-01-01 RX ADMIN — CALCIUM ACETATE 1334 MG: 667 CAPSULE ORAL at 12:01

## 2020-01-01 RX ADMIN — SODIUM CHLORIDE, PRESERVATIVE FREE 10 ML: 5 INJECTION INTRAVENOUS at 06:01

## 2020-01-01 RX ADMIN — PROTAMINE SULFATE 30 MG: 10 INJECTION, SOLUTION INTRAVENOUS at 13:47

## 2020-01-01 RX ADMIN — DIPHENHYDRAMINE HYDROCHLORIDE 12.5 MG: 50 INJECTION, SOLUTION INTRAMUSCULAR; INTRAVENOUS at 10:32

## 2020-01-01 RX ADMIN — CALCIUM ACETATE 1334 MG: 667 CAPSULE ORAL at 12:25

## 2020-01-01 RX ADMIN — HYDRALAZINE HYDROCHLORIDE 50 MG: 25 TABLET, FILM COATED ORAL at 21:09

## 2020-01-01 RX ADMIN — ACETAMINOPHEN 650 MG: 325 TABLET, FILM COATED ORAL at 13:28

## 2020-01-01 RX ADMIN — SODIUM CHLORIDE 3 G: 900 INJECTION INTRAVENOUS at 05:36

## 2020-01-01 RX ADMIN — CALCIUM ACETATE 1334 MG: 667 CAPSULE ORAL at 21:47

## 2020-01-01 ASSESSMENT — PULMONARY FUNCTION TESTS
PIF_VALUE: 1
PIF_VALUE: 0
PIF_VALUE: 1
PIF_VALUE: 0
PIF_VALUE: 1
PIF_VALUE: 0
PIF_VALUE: 1
PIF_VALUE: 0
PIF_VALUE: 1
PIF_VALUE: 0
PIF_VALUE: 1

## 2020-01-01 ASSESSMENT — PAIN SCALES - GENERAL
PAINLEVEL_OUTOF10: 8
PAINLEVEL_OUTOF10: 0
PAINLEVEL_OUTOF10: 8
PAINLEVEL_OUTOF10: 0
PAINLEVEL_OUTOF10: 0
PAINLEVEL_OUTOF10: 4
PAINLEVEL_OUTOF10: 0
PAINLEVEL_OUTOF10: 0
PAINLEVEL_OUTOF10: 10
PAINLEVEL_OUTOF10: 5
PAINLEVEL_OUTOF10: 0
PAINLEVEL_OUTOF10: 2
PAINLEVEL_OUTOF10: 3
PAINLEVEL_OUTOF10: 0
PAINLEVEL_OUTOF10: 5
PAINLEVEL_OUTOF10: 0
PAINLEVEL_OUTOF10: 8
PAINLEVEL_OUTOF10: 0
PAINLEVEL_OUTOF10: 5

## 2020-01-01 ASSESSMENT — PAIN DESCRIPTION - ORIENTATION
ORIENTATION: RIGHT
ORIENTATION: LEFT
ORIENTATION: LEFT
ORIENTATION: RIGHT
ORIENTATION: LEFT
ORIENTATION: RIGHT
ORIENTATION: RIGHT
ORIENTATION: LEFT

## 2020-01-01 ASSESSMENT — PAIN - FUNCTIONAL ASSESSMENT
PAIN_FUNCTIONAL_ASSESSMENT: PREVENTS OR INTERFERES SOME ACTIVE ACTIVITIES AND ADLS
PAIN_FUNCTIONAL_ASSESSMENT: 0-10
PAIN_FUNCTIONAL_ASSESSMENT: PREVENTS OR INTERFERES SOME ACTIVE ACTIVITIES AND ADLS
PAIN_FUNCTIONAL_ASSESSMENT: 0-10

## 2020-01-01 ASSESSMENT — PAIN DESCRIPTION - LOCATION
LOCATION: ANKLE
LOCATION: ANKLE
LOCATION: GROIN
LOCATION: FOOT
LOCATION: ANKLE

## 2020-01-01 ASSESSMENT — ENCOUNTER SYMPTOMS
EYE REDNESS: 0
COLOR CHANGE: 0
SHORTNESS OF BREATH: 0
DIARRHEA: 0
SINUS PRESSURE: 0
COUGH: 0
ABDOMINAL PAIN: 0
CHEST TIGHTNESS: 0
ABDOMINAL DISTENTION: 0
NAUSEA: 0
WHEEZING: 0

## 2020-01-01 ASSESSMENT — PAIN DESCRIPTION - PAIN TYPE
TYPE: ACUTE PAIN
TYPE: SURGICAL PAIN
TYPE: ACUTE PAIN
TYPE: ACUTE PAIN
TYPE: SURGICAL PAIN
TYPE: SURGICAL PAIN
TYPE: ACUTE PAIN
TYPE: ACUTE PAIN
TYPE: SURGICAL PAIN

## 2020-01-01 ASSESSMENT — PAIN DESCRIPTION - DESCRIPTORS
DESCRIPTORS: ACHING
DESCRIPTORS: ACHING
DESCRIPTORS: ACHING;BURNING;ITCHING
DESCRIPTORS: ACHING;CONSTANT
DESCRIPTORS: ACHING;CONSTANT
DESCRIPTORS: ACHING

## 2020-01-01 ASSESSMENT — PAIN DESCRIPTION - ONSET
ONSET: ON-GOING
ONSET: ON-GOING

## 2020-01-01 ASSESSMENT — LIFESTYLE VARIABLES: SMOKING_STATUS: 1

## 2020-01-01 ASSESSMENT — PAIN DESCRIPTION - PROGRESSION
CLINICAL_PROGRESSION: NOT CHANGED
CLINICAL_PROGRESSION: NOT CHANGED

## 2020-01-01 ASSESSMENT — PAIN DESCRIPTION - DIRECTION
RADIATING_TOWARDS: FOOT
RADIATING_TOWARDS: FOOT

## 2020-01-01 ASSESSMENT — PAIN DESCRIPTION - FREQUENCY: FREQUENCY: CONTINUOUS

## 2020-03-04 ENCOUNTER — HOSPITAL ENCOUNTER (OUTPATIENT)
Dept: CT IMAGING | Age: 55
Discharge: HOME | End: 2020-03-06
Payer: COMMERCIAL

## 2020-03-04 PROCEDURE — 74177 CT ABD & PELVIS W/CONTRAST: CPT

## 2020-03-04 PROCEDURE — 2580000003 HC RX 258: Performed by: RADIOLOGY

## 2020-03-04 PROCEDURE — 6360000004 HC RX CONTRAST MEDICATION: Performed by: RADIOLOGY

## 2020-03-04 RX ORDER — SODIUM CHLORIDE 0.9 % (FLUSH) 0.9 %
10 SYRINGE (ML) INJECTION
Status: COMPLETED | OUTPATIENT
Start: 2020-03-04 | End: 2020-03-04

## 2020-03-04 RX ADMIN — IOPAMIDOL 110 ML: 755 INJECTION, SOLUTION INTRAVENOUS at 12:48

## 2020-03-04 RX ADMIN — IOHEXOL 50 ML: 240 INJECTION, SOLUTION INTRATHECAL; INTRAVASCULAR; INTRAVENOUS; ORAL at 12:48

## 2020-03-04 RX ADMIN — Medication 10 ML: at 12:48

## 2020-03-19 ENCOUNTER — HOSPITAL ENCOUNTER (OUTPATIENT)
Age: 55
Discharge: HOME | End: 2020-03-21
Payer: COMMERCIAL

## 2020-03-19 PROCEDURE — 88112 CYTOPATH CELL ENHANCE TECH: CPT

## 2020-03-31 ENCOUNTER — OFFICE VISIT (OUTPATIENT)
Dept: VASCULAR SURGERY | Age: 55
End: 2020-03-31
Payer: COMMERCIAL

## 2020-03-31 VITALS — HEIGHT: 66 IN | WEIGHT: 180 LBS | RESPIRATION RATE: 16 BRPM | BODY MASS INDEX: 28.93 KG/M2

## 2020-03-31 PROCEDURE — 3017F COLORECTAL CA SCREEN DOC REV: CPT | Performed by: SURGERY

## 2020-03-31 PROCEDURE — G8427 DOCREV CUR MEDS BY ELIG CLIN: HCPCS | Performed by: SURGERY

## 2020-03-31 PROCEDURE — G8419 CALC BMI OUT NRM PARAM NOF/U: HCPCS | Performed by: SURGERY

## 2020-03-31 PROCEDURE — 1036F TOBACCO NON-USER: CPT | Performed by: SURGERY

## 2020-03-31 PROCEDURE — 99214 OFFICE O/P EST MOD 30 MIN: CPT | Performed by: SURGERY

## 2020-03-31 PROCEDURE — G8484 FLU IMMUNIZE NO ADMIN: HCPCS | Performed by: SURGERY

## 2020-03-31 NOTE — PROGRESS NOTES
[]  Gastrointestinal:             Nausea or vomiting:  No [x]/Yes []               Abdominal pain:  No [x]/Yes []                     Intestinal bleeding: No [x]/Yes []  Musculoskeletal:             Leg pain:   No [x]/Yes []      Back pain:   No [x]/Yes []                    Weakness:   No [x]/Yes []  Neurologic:             Numbness:   No [x]/Yes []      Paralysis:   No [x]/Yes []                       Headaches:   No [x]/Yes []  Hematologic, lymphatic:   Anemia:   No [x]/Yes []              Bleeding or bruising:  No [x]/Yes []              Fevers or chills: No [x]/Yes []  Endocrine:             Temp intolerance:   No [x]/Yes []                       Polydipsia, polyuria:  No [x]/Yes []  Skin:              Rash:    No [x]/Yes []      Ulcers:   No [x]/Yes []              Abnorm pigment: No [x]/Yes []  :              Frequency/urgency:  No [x]/Yes []      Hematuria:    No []/Yes [x]                      Incontinence:    No [x]/Yes []    PHYSICAL EXAM:  Vitals:    03/31/20 1131   Resp: 16     General Appearance: alert and oriented to person, place and time, in no acute distress, well developed and well- nourished  Neurologic: no cranial nerve deficit, speech normal  Head: normocephalic and atraumatic  Eyes: extraocular eye movements intact, conjunctivae normal  ENT: external ear and ear canal normal bilaterally, nose without deformity, no carotid bruits  Pulmonary/Chest: normal air movement, no respiratory distress  Cardiovascular: normal rate, regular rhythm, no murmur  Abdomen: non-distended, no masses  Musculoskeletal: no joint deformity or tenderness  Extremities: no leg edema bilaterally, right radiocephalic fistula aneurysmal but soft and patent.   Skin: warm and dry, no rash or erythema    PULSE EXAM      Right      Left   Brachial     Radial     Femoral     Popliteal     Dorsalis Pedis     Posterior Tibial 3 3   (3=normal, 2=diminished, 1=barely palpable, 4=widened)    RADIOLOGY: [facility] today    Problem List

## 2020-04-10 ENCOUNTER — HOSPITAL ENCOUNTER (EMERGENCY)
Age: 55
Discharge: HOME | End: 2020-04-11
Attending: EMERGENCY MEDICINE
Payer: COMMERCIAL

## 2020-04-10 VITALS
SYSTOLIC BLOOD PRESSURE: 159 MMHG | HEIGHT: 66 IN | BODY MASS INDEX: 27.32 KG/M2 | TEMPERATURE: 97.8 F | OXYGEN SATURATION: 98 % | HEART RATE: 91 BPM | WEIGHT: 170 LBS | DIASTOLIC BLOOD PRESSURE: 108 MMHG | RESPIRATION RATE: 17 BRPM

## 2020-04-10 LAB
ANION GAP SERPL CALCULATED.3IONS-SCNC: 20 MMOL/L (ref 7–16)
BASOPHILS ABSOLUTE: 0.05 E9/L (ref 0–0.2)
BASOPHILS RELATIVE PERCENT: 0.8 % (ref 0–2)
BUN BLDV-MCNC: 37 MG/DL (ref 6–20)
CALCIUM SERPL-MCNC: 9.2 MG/DL (ref 8.6–10.2)
CHLORIDE BLD-SCNC: 99 MMOL/L (ref 98–107)
CO2: 24 MMOL/L (ref 22–29)
CREAT SERPL-MCNC: 10.5 MG/DL (ref 0.7–1.2)
EOSINOPHILS ABSOLUTE: 0.08 E9/L (ref 0.05–0.5)
EOSINOPHILS RELATIVE PERCENT: 1.3 % (ref 0–6)
GFR AFRICAN AMERICAN: 6
GFR NON-AFRICAN AMERICAN: 6 ML/MIN/1.73
GLUCOSE BLD-MCNC: 182 MG/DL (ref 74–99)
HCT VFR BLD CALC: 36 % (ref 37–54)
HEMOGLOBIN: 11.3 G/DL (ref 12.5–16.5)
IMMATURE GRANULOCYTES #: 0.02 E9/L
IMMATURE GRANULOCYTES %: 0.3 % (ref 0–5)
LYMPHOCYTES ABSOLUTE: 1.09 E9/L (ref 1.5–4)
LYMPHOCYTES RELATIVE PERCENT: 18.3 % (ref 20–42)
MAGNESIUM: 2.4 MG/DL (ref 1.6–2.6)
MCH RBC QN AUTO: 28.3 PG (ref 26–35)
MCHC RBC AUTO-ENTMCNC: 31.4 % (ref 32–34.5)
MCV RBC AUTO: 90.2 FL (ref 80–99.9)
MONOCYTES ABSOLUTE: 0.44 E9/L (ref 0.1–0.95)
MONOCYTES RELATIVE PERCENT: 7.4 % (ref 2–12)
NEUTROPHILS ABSOLUTE: 4.29 E9/L (ref 1.8–7.3)
NEUTROPHILS RELATIVE PERCENT: 71.9 % (ref 43–80)
PDW BLD-RTO: 15.9 FL (ref 11.5–15)
PLATELET # BLD: 126 E9/L (ref 130–450)
PMV BLD AUTO: 10.9 FL (ref 7–12)
POTASSIUM REFLEX MAGNESIUM: 3.4 MMOL/L (ref 3.5–5)
RBC # BLD: 3.99 E12/L (ref 3.8–5.8)
SODIUM BLD-SCNC: 143 MMOL/L (ref 132–146)
WBC # BLD: 6 E9/L (ref 4.5–11.5)

## 2020-04-10 PROCEDURE — 80048 BASIC METABOLIC PNL TOTAL CA: CPT

## 2020-04-10 PROCEDURE — 85025 COMPLETE CBC W/AUTO DIFF WBC: CPT

## 2020-04-10 PROCEDURE — 83735 ASSAY OF MAGNESIUM: CPT

## 2020-04-10 PROCEDURE — 93005 ELECTROCARDIOGRAM TRACING: CPT | Performed by: STUDENT IN AN ORGANIZED HEALTH CARE EDUCATION/TRAINING PROGRAM

## 2020-04-10 PROCEDURE — 99283 EMERGENCY DEPT VISIT LOW MDM: CPT

## 2020-04-10 PROCEDURE — 6370000000 HC RX 637 (ALT 250 FOR IP): Performed by: STUDENT IN AN ORGANIZED HEALTH CARE EDUCATION/TRAINING PROGRAM

## 2020-04-10 RX ORDER — HYDRALAZINE HYDROCHLORIDE 25 MG/1
50 TABLET, FILM COATED ORAL EVERY 8 HOURS SCHEDULED
Status: DISCONTINUED | OUTPATIENT
Start: 2020-04-10 | End: 2020-04-10

## 2020-04-10 RX ORDER — HYDRALAZINE HYDROCHLORIDE 25 MG/1
25 TABLET, FILM COATED ORAL ONCE
Status: COMPLETED | OUTPATIENT
Start: 2020-04-10 | End: 2020-04-10

## 2020-04-10 RX ORDER — HYDRALAZINE HYDROCHLORIDE 25 MG/1
50 TABLET, FILM COATED ORAL ONCE
Status: DISCONTINUED | OUTPATIENT
Start: 2020-04-10 | End: 2020-04-10

## 2020-04-10 RX ADMIN — HYDRALAZINE HYDROCHLORIDE 25 MG: 25 TABLET, FILM COATED ORAL at 21:57

## 2020-04-10 ASSESSMENT — ENCOUNTER SYMPTOMS
CHEST TIGHTNESS: 0
COUGH: 0
DIARRHEA: 0
SHORTNESS OF BREATH: 0
WHEEZING: 0
VOMITING: 0
RHINORRHEA: 0
NAUSEA: 0
BACK PAIN: 0
ABDOMINAL PAIN: 0
CONSTIPATION: 0
COLOR CHANGE: 0

## 2020-04-11 NOTE — ED PROVIDER NOTES
Patient is a 56-year-old male with past medical history significant for hypertension, and dialysis, diabetes who presents the ER today with chief complaint of hypertension. Patient states that he was pulled over by the police and there was a federal warrant out for his arrest for cocaine possession and he was taken to alf. States that when he was taken to alf, his blood pressure was high and he was sent to the ED for evaluation. Patient states that he takes hydralazine multiple times a day and that he has not taken his evening dose, and that is why his blood pressure is high. He denies any symptoms at all at this time including headache, dizziness, fever, chest pain, visual changes, lightheadedness, abdominal pain, constipation, diarrhea, vomiting, nausea, cough, shortness of breath. The history is provided by the patient. Review of Systems   Constitutional: Negative for chills and fatigue. HENT: Negative for drooling and rhinorrhea. Eyes: Negative for visual disturbance. Respiratory: Negative for cough, chest tightness, shortness of breath and wheezing. Cardiovascular: Negative for chest pain and palpitations. Gastrointestinal: Negative for abdominal pain, constipation, diarrhea, nausea and vomiting. Genitourinary: Negative for dysuria and frequency. Musculoskeletal: Negative for back pain and myalgias. Skin: Negative for color change and wound. Neurological: Negative for syncope and headaches. Psychiatric/Behavioral: Negative for confusion. Physical Exam  Constitutional:       General: He is awake. Appearance: Normal appearance. He is well-developed and normal weight. HENT:      Head: Normocephalic and atraumatic. Right Ear: Hearing and external ear normal.      Left Ear: Hearing and external ear normal.      Nose: Nose normal.      Mouth/Throat:      Lips: Pink. Mouth: Mucous membranes are moist.      Pharynx: Oropharynx is clear. Uvula midline. Neutrophils Absolute 4.29 1.80 - 7.30 E9/L    Immature Granulocytes # 0.02 E9/L    Lymphocytes Absolute 1.09 (L) 1.50 - 4.00 E9/L    Monocytes Absolute 0.44 0.10 - 0.95 E9/L    Eosinophils Absolute 0.08 0.05 - 0.50 E9/L    Basophils Absolute 0.05 0.00 - 0.20 S7/Z   Basic Metabolic Panel w/ Reflex to MG   Result Value Ref Range    Sodium 143 132 - 146 mmol/L    Potassium reflex Magnesium 3.4 (L) 3.5 - 5.0 mmol/L    Chloride 99 98 - 107 mmol/L    CO2 24 22 - 29 mmol/L    Anion Gap 20 (H) 7 - 16 mmol/L    Glucose 182 (H) 74 - 99 mg/dL    BUN 37 (H) 6 - 20 mg/dL    CREATININE 10.5 (HH) 0.7 - 1.2 mg/dL    GFR Non-African American 6 >=60 mL/min/1.73    GFR African American 6     Calcium 9.2 8.6 - 10.2 mg/dL   Magnesium   Result Value Ref Range    Magnesium 2.4 1.6 - 2.6 mg/dL   EKG 12 Lead   Result Value Ref Range    Ventricular Rate 95 BPM    Atrial Rate 95 BPM    P-R Interval 150 ms    QRS Duration 100 ms    Q-T Interval 376 ms    QTc Calculation (Bazett) 472 ms    P Axis 65 degrees    R Axis -15 degrees    T Axis 82 degrees       Radiology:  No orders to display       ------------------------- NURSING NOTES AND VITALS REVIEWED ---------------------------  Date / Time Roomed:  4/10/2020  9:38 PM  ED Bed Assignment:  12/12    The nursing notes within the ED encounter and vital signs as below have been reviewed. BP (!) 159/108   Pulse 91   Temp 97.8 °F (36.6 °C)   Resp 17   Ht 5' 6\" (1.676 m)   Wt 170 lb (77.1 kg)   SpO2 98%   BMI 27.44 kg/m²   Oxygen Saturation Interpretation: Normal      ------------------------------------------ PROGRESS NOTES ------------------------------------------  11:52 PM EDT  I have spoken with the patient and discussed todays results, in addition to providing specific details for the plan of care and counseling regarding the diagnosis and prognosis. Their questions are answered at this time and they are agreeable with the plan.  I discussed at length with them reasons for immediate

## 2020-04-11 NOTE — ED NOTES
Bed: 12  Expected date:   Expected time:   Means of arrival:   Comments:  triage     Kulwant Enriquez RN  04/10/20 [id number]

## 2020-04-11 NOTE — ED NOTES
[facility] PD notified of pt on discharge.       Reji Bhat RN  04/10/20 [id number]

## 2020-04-13 LAB
EKG ATRIAL RATE: 95 BPM
EKG P AXIS: 65 DEGREES
EKG P-R INTERVAL: 150 MS
EKG Q-T INTERVAL: 376 MS
EKG QRS DURATION: 100 MS
EKG QTC CALCULATION (BAZETT): 472 MS
EKG R AXIS: -15 DEGREES
EKG T AXIS: 82 DEGREES
EKG VENTRICULAR RATE: 95 BPM

## 2020-04-13 PROCEDURE — 93010 ELECTROCARDIOGRAM REPORT: CPT | Performed by: INTERNAL MEDICINE

## 2020-05-01 ENCOUNTER — VIRTUAL VISIT (OUTPATIENT)
Dept: CARDIOLOGY CLINIC | Age: 55
End: 2020-05-01
Payer: COMMERCIAL

## 2020-05-01 VITALS — HEIGHT: 66 IN | BODY MASS INDEX: 25.71 KG/M2 | WEIGHT: 160 LBS

## 2020-05-01 PROCEDURE — G8419 CALC BMI OUT NRM PARAM NOF/U: HCPCS | Performed by: INTERNAL MEDICINE

## 2020-05-01 PROCEDURE — 99242 OFF/OP CONSLTJ NEW/EST SF 20: CPT | Performed by: INTERNAL MEDICINE

## 2020-05-01 PROCEDURE — G8427 DOCREV CUR MEDS BY ELIG CLIN: HCPCS | Performed by: INTERNAL MEDICINE

## 2020-05-01 RX ORDER — METOPROLOL SUCCINATE 25 MG/1
25 TABLET, EXTENDED RELEASE ORAL DAILY
Qty: 30 TABLET | Refills: 6 | Status: ON HOLD
Start: 2020-05-01 | End: 2020-01-01 | Stop reason: HOSPADM

## 2020-05-01 NOTE — PROGRESS NOTES
TELEHEALTH EVALUATION -- Audio/Visual (During DCSFI-67 public health emergency)    Reason for Virtual Video visit:  Pre[facility] Cardiac [id number] patient      History of Present illness:   48 yr pt with AAA, Pericardial effusion, LV dysfunction, Pulmonary HTN, HTN, ESRD had virtual visit today  Seen by Dr Florina Marrero last year 3/2019, and Stress test and Echo ordered for Cardiac clearance for AAA surgery;  he did not have those tests done and did not f/u with Vascular surgery till recently  His AAA enlarged since last year. Needs surgical repair  Denies any exertional chest pain or SOB, No palpitations, dizzy or syncope  No PND or orthopnea  Noncompliant with his medications-Stopped most of his medications  No recent surgeries or hospitalization  Smokes marjuana    Review of systems:  Review of 8 systems negative except as mentioned in the HPI    Medical/ Surgical history: Reviewed  LV dysfunction  Essential HTN  Pericardial effusion  Pulmonary HTN  LVH  Diabetes  AAA    Allergies:  Reviewed    Social Hx: Reviewed. Smokes marjuana  No tobacco use - Quit      Medications: Reviewed. CT abdomin: 3/4/2020  Impression       Fusiform aneurysmal dilation of the thoracoabdominal aorta and its   branches are once again seen. There is no evidence of dissection. Abdominal aorta measures approximately 6 cm in greatest cross-section   over length of approximately 10 cm. Iliac arteries are over 1.5 cm in   cross-section.       Kidneys are abnormal with multiple low-density lesions which appear to   be benign cysts. There are a few of these there are new compared to   prior exam of 2016 and some have shown interval growth. There is no   convincing evidence for solid renal mass.       Enlarged prostate. 2D Echo 11/14/2015   Left ventricle is mildly enlarged . Moderate left ventricular concentric   hypertrophy noted. Ejection fraction is visually estimated at 40%.  There   is doppler evidence of stage III diastolic dysfunction. Moderate tricuspid regurgitation. RVSP is 70 mmHg. Mean PAP 44 mmHg. Pulmonary hypertension is moderate . There is a small to moderate circumferential pericardial effusion noted. ----------------------------------------------------------------   Electronically signed by Donovan Wilson MD(Interpreting physician)   on 11/14/2015 05:17 PM   ----------------------------------------------------------------      Physical exam:     Not examined since this is a Virtual visit due to COVID-19 pandemic    Vitals:           Impression/Recommendations:    Preopertive cardiac evaluation - Lexiscan stress due to vascular surgery and multiple CAD risk factors, Letter of cardiac clearance after his tests  - Lexiscan stress test    AAA without rupture 6x10cm per CT scan 3/2020 - Follows with Dr Deisi Chin    LV dysfunction, likely nonischemic Cardiomyopathy - EF 40% in 2015 - No acute CHF - He is not taking most of his medications including BB, Lisinopril - Agreeable to take low dose BB-Metoprolol 25mg  - 2D Echo doppler study    Pericardial effusion - by Echo 2015  - 2D Echo doppler study    Moderate Pulmonary HTN, RVSP 60mmHg  - 2D Echo doppler study    LVH due to hypertensive heart disease    Essential HTN - On Hydralazine    ESRD  - on HD    Noncompliance -     Diabetes          Patient evaluated by a Virtual Visit (video visit) encounter to address concerns as mentioned above. A caregiver was present when appropriate. Due to this being a TeleHealth encounter (During W[place]J-13 public health emergency), evaluation of the following organ systems was limited: Vitals/Constitutional/EENT/Resp/CV/GI//MS/Neuro/Skin/Heme-Lymph-Imm.   Pursuant to the emergency declaration under the [facility], 1135 waiver authority and the [employer] Act, this Virtual Visit was conducted with patient's (and/or legal guardian's) consent,

## 2020-05-07 ENCOUNTER — TELEPHONE (OUTPATIENT)
Dept: CARDIOLOGY | Age: 55
End: 2020-05-07

## 2020-05-07 NOTE — TELEPHONE ENCOUNTER
Spoke with patient reminder of appointment on 5/11/2020 at 08:30 for Lexiscan stress test and at 11:00am for a echocardiogram. Pre-procedure checklist reviewed with patient. Patient confirmed appointment.

## 2020-05-11 ENCOUNTER — TELEPHONE (OUTPATIENT)
Dept: CARDIOLOGY | Age: 55
End: 2020-05-11

## 2020-05-11 ENCOUNTER — HOSPITAL ENCOUNTER (OUTPATIENT)
Dept: CARDIOLOGY | Age: 55
Discharge: HOME | End: 2020-05-11
Payer: COMMERCIAL

## 2020-05-11 VITALS
WEIGHT: 160 LBS | SYSTOLIC BLOOD PRESSURE: 150 MMHG | DIASTOLIC BLOOD PRESSURE: 104 MMHG | BODY MASS INDEX: 25.71 KG/M2 | HEIGHT: 66 IN | HEART RATE: 86 BPM

## 2020-05-11 LAB
LV EF: 37 %
LVEF MODALITY: NORMAL

## 2020-05-11 PROCEDURE — 93306 TTE W/DOPPLER COMPLETE: CPT | Performed by: PSYCHIATRY & NEUROLOGY

## 2020-05-11 PROCEDURE — A9502 TC99M TETROFOSMIN: HCPCS | Performed by: INTERNAL MEDICINE

## 2020-05-11 PROCEDURE — 93017 CV STRESS TEST TRACING ONLY: CPT

## 2020-05-11 PROCEDURE — 6360000002 HC RX W HCPCS: Performed by: INTERNAL MEDICINE

## 2020-05-11 PROCEDURE — 3430000000 HC RX DIAGNOSTIC RADIOPHARMACEUTICAL: Performed by: INTERNAL MEDICINE

## 2020-05-11 PROCEDURE — 2580000003 HC RX 258: Performed by: INTERNAL MEDICINE

## 2020-05-11 PROCEDURE — 78452 HT MUSCLE IMAGE SPECT MULT: CPT

## 2020-05-11 RX ORDER — DRONABINOL 5 MG/1
5 CAPSULE ORAL DAILY
COMMUNITY
Start: 2020-04-30 | End: 2021-01-01

## 2020-05-11 RX ORDER — SODIUM CHLORIDE 0.9 % (FLUSH) 0.9 %
10 SYRINGE (ML) INJECTION PRN
Status: DISCONTINUED | OUTPATIENT
Start: 2020-05-11 | End: 2020-05-12 | Stop reason: HOSPADM

## 2020-05-11 RX ADMIN — TETROFOSMIN 8.7 MILLICURIE: 0.23 INJECTION, POWDER, LYOPHILIZED, FOR SOLUTION INTRAVENOUS at 08:49

## 2020-05-11 RX ADMIN — TETROFOSMIN 26.2 MILLICURIE: 0.23 INJECTION, POWDER, LYOPHILIZED, FOR SOLUTION INTRAVENOUS at 10:17

## 2020-05-11 RX ADMIN — SODIUM CHLORIDE, PRESERVATIVE FREE 10 ML: 5 INJECTION INTRAVENOUS at 10:17

## 2020-05-11 RX ADMIN — SODIUM CHLORIDE, PRESERVATIVE FREE 10 ML: 5 INJECTION INTRAVENOUS at 08:49

## 2020-05-11 RX ADMIN — REGADENOSON 0.4 MG: 0.08 INJECTION, SOLUTION INTRAVENOUS at 10:17

## 2020-05-11 RX ADMIN — SODIUM CHLORIDE, PRESERVATIVE FREE 10 ML: 5 INJECTION INTRAVENOUS at 10:18

## 2020-05-11 NOTE — PROCEDURES
mild defect was present in the mid inferior wall(s) that was  small size on the post regadenoson images - likely attenuation artifact. The resting images are show no change. Gated SPECT left ventricular ejection fraction was calculated to be 25-30% with moderate to severe global hypokinesis. Impression:    1. Electrocardiographically normal regadenoson infusion with a clinicallynonischemic response. 2. Myocardial perfusion imaging showed no reversible ischemia. 3. Overall left ventricular systolic function reduced at 25-30% with moderate to severe global hypokinesis. 4.   Intermediate risk general pharmacologic stress test    No recent study to compare. Thank you for sending your patient to this [employer].      Electronically signed by Jackie Castillo MD on 5/13/2020 at 8:41 AM

## 2020-05-13 NOTE — TELEPHONE ENCOUNTER
Stress test showed no indication of blockages  Echo showed heart function 35-38% compared to 40% in 2015.     Needs OV/Virtual viist to add/adjust medications    Send letter of clearance to Dr Steffany Jefferson for his AAA surgery - moderate cardiac risk

## 2020-05-14 ENCOUNTER — TELEPHONE (OUTPATIENT)
Dept: CARDIOLOGY CLINIC | Age: 55
End: 2020-05-14

## 2020-05-20 ENCOUNTER — VIRTUAL VISIT (OUTPATIENT)
Dept: CARDIOLOGY CLINIC | Age: 55
End: 2020-05-20
Payer: COMMERCIAL

## 2020-05-20 VITALS — HEIGHT: 66 IN | BODY MASS INDEX: 25.71 KG/M2 | WEIGHT: 160 LBS

## 2020-05-20 PROCEDURE — G8419 CALC BMI OUT NRM PARAM NOF/U: HCPCS | Performed by: INTERNAL MEDICINE

## 2020-05-20 PROCEDURE — 1036F TOBACCO NON-USER: CPT | Performed by: INTERNAL MEDICINE

## 2020-05-20 PROCEDURE — 3017F COLORECTAL CA SCREEN DOC REV: CPT | Performed by: INTERNAL MEDICINE

## 2020-05-20 PROCEDURE — 99213 OFFICE O/P EST LOW 20 MIN: CPT | Performed by: INTERNAL MEDICINE

## 2020-05-20 PROCEDURE — G8427 DOCREV CUR MEDS BY ELIG CLIN: HCPCS | Performed by: INTERNAL MEDICINE

## 2020-05-20 RX ORDER — LISINOPRIL 5 MG/1
5 TABLET ORAL DAILY
Qty: 30 TABLET | Refills: 8 | Status: ON HOLD
Start: 2020-05-20 | End: 2020-06-24 | Stop reason: HOSPADM

## 2020-05-20 RX ORDER — HYDRALAZINE HYDROCHLORIDE 25 MG/1
25 TABLET, FILM COATED ORAL 2 TIMES DAILY
Qty: 180 TABLET | Refills: 3 | Status: ON HOLD
Start: 2020-05-20 | End: 2020-06-24 | Stop reason: HOSPADM

## 2020-05-20 NOTE — PROGRESS NOTES
TELEHEALTH EVALUATION -- Audio (During RDEVJ-06 public health emergency)    Reason for Virtual TELEPHONE visit:   Discuss test results and cardiac clearance    Patient place of visit: His home. Physician's place of visit: [facility] office. History of Present illness:   48 yr pt with AAA, LV dysfunction, Pulmonary HTN, HTN, ESRD had virtual visit today  Had stress and Echo  Seen by Dr Florina Marrero last year 3/2019 - He was started on Metoprolol Succinate 25mg  Denies any exertional chest pain or SOB, No palpitations, dizzy or syncope  No PND or orthopnea  Compliant with medications  No recent surgeries or hospitalization    Review of systems:  Review of 8 systems negative except as mentioned in the HPI    Medical/ Surgical history: Reviewed  CMP  Chronic HprEF  VHD  Pulmonary HTN  HTN  AAA  LV diastolic dysfunction  ESRD-On HD  Noncompliance with Meds    Allergies:  Reviewed    Social Hx: Reviewed. Medications: Reviewed. Echo 5/11/20   Mild left ventricular chamber dilatation. Moderate to severe global hypokinesis, EF estimated about 35-38%. Stage III diastolic dysfunction. Left atrium is enlarged. Interatrial septum not well visualized but appears intact. Normal right ventricle structure and function. There is moderate mitral regurgitation - ERO 0.2cm2, PISA 0.7, RV 33cc. No mitral valve prolapse. The aortic valve appears mildly sclerotic. There is mild to moderate tricuspid regurgitation. Moderate to severe pulmonary hypertension, RVSP 68mmHg. Normal aortic root size and ascending aorta. No evidence of pericardial effusion. Pericardium appears normal.   No intracardiac mass. Compared to prior echo from 11/2015 which showed - EF 40%, RVSP 70mmHg,   Small to moderate pericardial effusion. Lexiscan stress 5/11/2020  Impression:    1. Electrocardiographically normal regadenoson infusion with a   Clinically nonischemic response.   2. Myocardial perfusion imaging showed no

## 2020-06-16 ENCOUNTER — PREP FOR PROCEDURE (OUTPATIENT)
Dept: UROLOGY | Age: 55
End: 2020-06-16

## 2020-06-16 DIAGNOSIS — R31.0 GROSS HEMATURIA: Primary | ICD-10-CM

## 2020-06-16 RX ORDER — SODIUM CHLORIDE 9 MG/ML
INJECTION, SOLUTION INTRAVENOUS CONTINUOUS
Status: CANCELLED | OUTPATIENT
Start: 2020-06-16

## 2020-06-16 RX ORDER — SODIUM CHLORIDE 0.9 % (FLUSH) 0.9 %
10 SYRINGE (ML) INJECTION PRN
Status: CANCELLED | OUTPATIENT
Start: 2020-06-16

## 2020-06-16 RX ORDER — SODIUM CHLORIDE 0.9 % (FLUSH) 0.9 %
10 SYRINGE (ML) INJECTION EVERY 12 HOURS SCHEDULED
Status: CANCELLED | OUTPATIENT
Start: 2020-06-16

## 2020-06-19 ENCOUNTER — HOSPITAL ENCOUNTER (OUTPATIENT)
Age: 55
Discharge: HOME | End: 2020-06-21
Payer: COMMERCIAL

## 2020-06-19 PROCEDURE — U0003 INFECTIOUS AGENT DETECTION BY NUCLEIC ACID (DNA OR RNA); SEVERE ACUTE RESPIRATORY SYNDROME CORONAVIRUS 2 (SARS-COV-2) (CORONAVIRUS DISEASE [COVID-19]), AMPLIFIED PROBE TECHNIQUE, MAKING USE OF HIGH THROUGHPUT TECHNOLOGIES AS DESCRIBED BY CMS-2020-01-R: HCPCS

## 2020-06-20 ENCOUNTER — ANESTHESIA EVENT (OUTPATIENT)
Dept: OPERATING ROOM | Age: 55
DRG: 182 | End: 2020-06-20
Payer: COMMERCIAL

## 2020-06-20 LAB
SARS-COV-2: NOT DETECTED
SOURCE: NORMAL

## 2020-06-22 ENCOUNTER — HOSPITAL ENCOUNTER (INPATIENT)
Age: 55
LOS: 2 days | Discharge: HOME | DRG: 182 | End: 2020-06-24
Attending: UROLOGY | Admitting: SURGERY
Payer: COMMERCIAL

## 2020-06-22 ENCOUNTER — ANESTHESIA EVENT (OUTPATIENT)
Dept: OPERATING ROOM | Age: 55
DRG: 182 | End: 2020-06-22
Payer: COMMERCIAL

## 2020-06-22 ENCOUNTER — ANESTHESIA (OUTPATIENT)
Dept: OPERATING ROOM | Age: 55
DRG: 182 | End: 2020-06-22
Payer: COMMERCIAL

## 2020-06-22 VITALS — RESPIRATION RATE: 1 BRPM | OXYGEN SATURATION: 96 %

## 2020-06-22 LAB
ABO/RH: NORMAL
ANION GAP SERPL CALCULATED.3IONS-SCNC: 16 MMOL/L (ref 7–16)
ANTIBODY SCREEN: NORMAL
APTT: 31.4 SEC (ref 24.5–35.1)
B.E.: 2.1 MMOL/L (ref -3–0)
BUN BLDV-MCNC: 28 MG/DL (ref 6–20)
CALCIUM SERPL-MCNC: 9.6 MG/DL (ref 8.6–10.2)
CARDIOPULMONARY BYPASS: YES
CHLORIDE BLD-SCNC: 99 MMOL/L (ref 98–107)
CO2: 27 MMOL/L (ref 22–29)
CREAT SERPL-MCNC: 10.5 MG/DL (ref 0.7–1.2)
DEVICE: ABNORMAL
GFR AFRICAN AMERICAN: 6
GFR NON-AFRICAN AMERICAN: 6 ML/MIN/1.73
GLUCOSE BLD-MCNC: 112 MG/DL (ref 74–99)
HCO3 ARTERIAL: 27.3 MMOL/L (ref 22–26)
HCT (EST): 30 % (ref 37–54)
HCT VFR BLD CALC: 29.4 % (ref 37–54)
HEMOGLOBIN: 9.2 G/DL (ref 12.5–16.5)
HGB, (EST): 10.2 G/DL (ref 12.5–15.5)
INR BLD: 1.3
MCH RBC QN AUTO: 28 PG (ref 26–35)
MCHC RBC AUTO-ENTMCNC: 31.3 % (ref 32–34.5)
MCV RBC AUTO: 89.6 FL (ref 80–99.9)
METER GLUCOSE: 116 MG/DL (ref 74–99)
METER GLUCOSE: 119 MG/DL (ref 74–99)
METER GLUCOSE: 141 MG/DL (ref 74–99)
O2 SATURATION: 93.4 % (ref 92–98.5)
OPERATOR ID: ABNORMAL
PCO2 ARTERIAL: 44.2 MMHG (ref 35–45)
PDW BLD-RTO: 17.2 FL (ref 11.5–15)
PH BLOOD GAS: 7.4 (ref 7.35–7.45)
PLATELET # BLD: 164 E9/L (ref 130–450)
PMV BLD AUTO: 10.5 FL (ref 7–12)
PO2 ARTERIAL: 69.1 MMHG (ref 80–100)
POTASSIUM SERPL-SCNC: 3 MMOL/L (ref 3.5–5.5)
POTASSIUM SERPL-SCNC: 3.4 MMOL/L (ref 3.5–5)
PROTHROMBIN TIME: 15 SEC (ref 9.3–12.4)
RBC # BLD: 3.28 E12/L (ref 3.8–5.8)
SODIUM BLD-SCNC: 142 MMOL/L (ref 132–146)
SOURCE, BLOOD GAS: ABNORMAL
WBC # BLD: 4.5 E9/L (ref 4.5–11.5)

## 2020-06-22 PROCEDURE — 82962 GLUCOSE BLOOD TEST: CPT

## 2020-06-22 PROCEDURE — 86901 BLOOD TYPING SEROLOGIC RH(D): CPT

## 2020-06-22 PROCEDURE — C1894 INTRO/SHEATH, NON-LASER: HCPCS | Performed by: UROLOGY

## 2020-06-22 PROCEDURE — 6360000002 HC RX W HCPCS: Performed by: SURGERY

## 2020-06-22 PROCEDURE — 2709999900 HC NON-CHARGEABLE SUPPLY: Performed by: UROLOGY

## 2020-06-22 PROCEDURE — 2580000003 HC RX 258

## 2020-06-22 PROCEDURE — 3700000000 HC ANESTHESIA ATTENDED CARE: Performed by: UROLOGY

## 2020-06-22 PROCEDURE — 2000000000 HC ICU R&B

## 2020-06-22 PROCEDURE — 2780000010 HC IMPLANT OTHER: Performed by: UROLOGY

## 2020-06-22 PROCEDURE — 34705 EVAC RPR A-BIILIAC NDGFT: CPT | Performed by: SURGERY

## 2020-06-22 PROCEDURE — 34812 OPN FEM ART EXPOS: CPT | Performed by: SURGERY

## 2020-06-22 PROCEDURE — C9248 INJ, CLEVIDIPINE BUTYRATE: HCPCS | Performed by: STUDENT IN AN ORGANIZED HEALTH CARE EDUCATION/TRAINING PROGRAM

## 2020-06-22 PROCEDURE — 6360000002 HC RX W HCPCS: Performed by: INTERNAL MEDICINE

## 2020-06-22 PROCEDURE — C1874 STENT, COATED/COV W/DEL SYS: HCPCS | Performed by: UROLOGY

## 2020-06-22 PROCEDURE — 7100000000 HC PACU RECOVERY - FIRST 15 MIN: Performed by: UROLOGY

## 2020-06-22 PROCEDURE — 6360000002 HC RX W HCPCS: Performed by: STUDENT IN AN ORGANIZED HEALTH CARE EDUCATION/TRAINING PROGRAM

## 2020-06-22 PROCEDURE — 36415 COLL VENOUS BLD VENIPUNCTURE: CPT

## 2020-06-22 PROCEDURE — 2500000003 HC RX 250 WO HCPCS

## 2020-06-22 PROCEDURE — 6360000002 HC RX W HCPCS: Performed by: ANESTHESIOLOGY

## 2020-06-22 PROCEDURE — 85730 THROMBOPLASTIN TIME PARTIAL: CPT

## 2020-06-22 PROCEDURE — 85027 COMPLETE CBC AUTOMATED: CPT

## 2020-06-22 PROCEDURE — 6370000000 HC RX 637 (ALT 250 FOR IP): Performed by: STUDENT IN AN ORGANIZED HEALTH CARE EDUCATION/TRAINING PROGRAM

## 2020-06-22 PROCEDURE — 3600000017 HC SURGERY HYBRID ADDL 15MIN: Performed by: UROLOGY

## 2020-06-22 PROCEDURE — C9248 INJ, CLEVIDIPINE BUTYRATE: HCPCS

## 2020-06-22 PROCEDURE — 7100000001 HC PACU RECOVERY - ADDTL 15 MIN: Performed by: UROLOGY

## 2020-06-22 PROCEDURE — 3600000007 HC SURGERY HYBRID BASE: Performed by: UROLOGY

## 2020-06-22 PROCEDURE — 0TJB8ZZ INSPECTION OF BLADDER, VIA NATURAL OR ARTIFICIAL OPENING ENDOSCOPIC: ICD-10-PCS | Performed by: UROLOGY

## 2020-06-22 PROCEDURE — 3700000001 HC ADD 15 MINUTES (ANESTHESIA): Performed by: UROLOGY

## 2020-06-22 PROCEDURE — 2580000003 HC RX 258: Performed by: SURGERY

## 2020-06-22 PROCEDURE — 86900 BLOOD TYPING SEROLOGIC ABO: CPT

## 2020-06-22 PROCEDURE — 87081 CULTURE SCREEN ONLY: CPT

## 2020-06-22 PROCEDURE — 85347 COAGULATION TIME ACTIVATED: CPT

## 2020-06-22 PROCEDURE — 6370000000 HC RX 637 (ALT 250 FOR IP): Performed by: ANESTHESIOLOGY

## 2020-06-22 PROCEDURE — C1769 GUIDE WIRE: HCPCS | Performed by: UROLOGY

## 2020-06-22 PROCEDURE — 6360000002 HC RX W HCPCS

## 2020-06-22 PROCEDURE — 80048 BASIC METABOLIC PNL TOTAL CA: CPT

## 2020-06-22 PROCEDURE — 2700000000 HC OXYGEN THERAPY PER DAY

## 2020-06-22 PROCEDURE — C2628 CATHETER, OCCLUSION: HCPCS | Performed by: UROLOGY

## 2020-06-22 PROCEDURE — 82803 BLOOD GASES ANY COMBINATION: CPT

## 2020-06-22 PROCEDURE — 85610 PROTHROMBIN TIME: CPT

## 2020-06-22 PROCEDURE — 04V03DZ RESTRICTION OF ABDOMINAL AORTA WITH INTRALUMINAL DEVICE, PERCUTANEOUS APPROACH: ICD-10-PCS | Performed by: SURGERY

## 2020-06-22 PROCEDURE — 86850 RBC ANTIBODY SCREEN: CPT

## 2020-06-22 DEVICE — ZENITH, SPIRAL-Z AAA ILIAC LEG
Type: IMPLANTABLE DEVICE | Site: AORTA | Status: FUNCTIONAL
Brand: ZENITH SPIRAL-Z

## 2020-06-22 DEVICE — ZENITH FLEX, AAA ENDOVASCULAR GRAFT MAIN BODY
Type: IMPLANTABLE DEVICE | Site: AORTA | Status: FUNCTIONAL
Brand: ZENITH FLEX

## 2020-06-22 RX ORDER — SODIUM CHLORIDE 9 MG/ML
INJECTION, SOLUTION INTRAVENOUS CONTINUOUS
Status: DISCONTINUED | OUTPATIENT
Start: 2020-06-22 | End: 2020-06-22

## 2020-06-22 RX ORDER — FENTANYL CITRATE 50 UG/ML
INJECTION, SOLUTION INTRAMUSCULAR; INTRAVENOUS PRN
Status: DISCONTINUED | OUTPATIENT
Start: 2020-06-22 | End: 2020-06-22 | Stop reason: SDUPTHER

## 2020-06-22 RX ORDER — DEXTROSE MONOHYDRATE 50 MG/ML
100 INJECTION, SOLUTION INTRAVENOUS PRN
Status: DISCONTINUED | OUTPATIENT
Start: 2020-06-22 | End: 2020-06-24 | Stop reason: HOSPADM

## 2020-06-22 RX ORDER — SODIUM CHLORIDE 0.9 % (FLUSH) 0.9 %
10 SYRINGE (ML) INJECTION EVERY 12 HOURS SCHEDULED
Status: DISCONTINUED | OUTPATIENT
Start: 2020-06-22 | End: 2020-06-22 | Stop reason: HOSPADM

## 2020-06-22 RX ORDER — NITROGLYCERIN 5 MG/ML
INJECTION, SOLUTION INTRAVENOUS PRN
Status: DISCONTINUED | OUTPATIENT
Start: 2020-06-22 | End: 2020-06-22 | Stop reason: SDUPTHER

## 2020-06-22 RX ORDER — PROPOFOL 10 MG/ML
INJECTION, EMULSION INTRAVENOUS PRN
Status: DISCONTINUED | OUTPATIENT
Start: 2020-06-22 | End: 2020-06-22 | Stop reason: SDUPTHER

## 2020-06-22 RX ORDER — ONDANSETRON 2 MG/ML
INJECTION INTRAMUSCULAR; INTRAVENOUS PRN
Status: DISCONTINUED | OUTPATIENT
Start: 2020-06-22 | End: 2020-06-22 | Stop reason: SDUPTHER

## 2020-06-22 RX ORDER — CEFAZOLIN SODIUM 2 G/50ML
2 SOLUTION INTRAVENOUS
Status: COMPLETED | OUTPATIENT
Start: 2020-06-22 | End: 2020-06-22

## 2020-06-22 RX ORDER — DEXTROSE MONOHYDRATE 25 G/50ML
12.5 INJECTION, SOLUTION INTRAVENOUS PRN
Status: DISCONTINUED | OUTPATIENT
Start: 2020-06-22 | End: 2020-06-24 | Stop reason: HOSPADM

## 2020-06-22 RX ORDER — GLYCOPYRROLATE 1 MG/5 ML
SYRINGE (ML) INTRAVENOUS PRN
Status: DISCONTINUED | OUTPATIENT
Start: 2020-06-22 | End: 2020-06-22 | Stop reason: SDUPTHER

## 2020-06-22 RX ORDER — OXYCODONE HYDROCHLORIDE 5 MG/1
5 TABLET ORAL EVERY 4 HOURS PRN
Status: DISCONTINUED | OUTPATIENT
Start: 2020-06-22 | End: 2020-06-24 | Stop reason: HOSPADM

## 2020-06-22 RX ORDER — SODIUM CHLORIDE 0.9 % (FLUSH) 0.9 %
10 SYRINGE (ML) INJECTION PRN
Status: DISCONTINUED | OUTPATIENT
Start: 2020-06-22 | End: 2020-06-22 | Stop reason: HOSPADM

## 2020-06-22 RX ORDER — CEFAZOLIN SODIUM 2 G/50ML
2 SOLUTION INTRAVENOUS
Status: DISCONTINUED | OUTPATIENT
Start: 2020-06-22 | End: 2020-06-22 | Stop reason: SDUPTHER

## 2020-06-22 RX ORDER — POTASSIUM CHLORIDE 7.45 MG/ML
10 INJECTION INTRAVENOUS
Status: COMPLETED | OUTPATIENT
Start: 2020-06-22 | End: 2020-06-22

## 2020-06-22 RX ORDER — CEFAZOLIN SODIUM 2 G/50ML
2 SOLUTION INTRAVENOUS ONCE
Status: COMPLETED | OUTPATIENT
Start: 2020-06-22 | End: 2020-06-22

## 2020-06-22 RX ORDER — SODIUM CHLORIDE 0.9 % (FLUSH) 0.9 %
10 SYRINGE (ML) INJECTION PRN
Status: DISCONTINUED | OUTPATIENT
Start: 2020-06-22 | End: 2020-06-22 | Stop reason: SDUPTHER

## 2020-06-22 RX ORDER — MORPHINE SULFATE 2 MG/ML
2 INJECTION, SOLUTION INTRAMUSCULAR; INTRAVENOUS
Status: DISCONTINUED | OUTPATIENT
Start: 2020-06-22 | End: 2020-06-22

## 2020-06-22 RX ORDER — SODIUM CHLORIDE 9 MG/ML
INJECTION, SOLUTION INTRAVENOUS CONTINUOUS PRN
Status: DISCONTINUED | OUTPATIENT
Start: 2020-06-22 | End: 2020-06-22 | Stop reason: SDUPTHER

## 2020-06-22 RX ORDER — LIDOCAINE HYDROCHLORIDE 20 MG/ML
INJECTION, SOLUTION INTRAVENOUS PRN
Status: DISCONTINUED | OUTPATIENT
Start: 2020-06-22 | End: 2020-06-22 | Stop reason: SDUPTHER

## 2020-06-22 RX ORDER — SODIUM CHLORIDE 9 MG/ML
INJECTION, SOLUTION INTRAVENOUS CONTINUOUS
Status: DISCONTINUED | OUTPATIENT
Start: 2020-06-22 | End: 2020-06-22 | Stop reason: SDUPTHER

## 2020-06-22 RX ORDER — SODIUM CHLORIDE 0.9 % (FLUSH) 0.9 %
10 SYRINGE (ML) INJECTION PRN
Status: DISCONTINUED | OUTPATIENT
Start: 2020-06-22 | End: 2020-06-24 | Stop reason: HOSPADM

## 2020-06-22 RX ORDER — SODIUM CHLORIDE 0.9 % (FLUSH) 0.9 %
10 SYRINGE (ML) INJECTION EVERY 12 HOURS SCHEDULED
Status: DISCONTINUED | OUTPATIENT
Start: 2020-06-22 | End: 2020-06-24 | Stop reason: HOSPADM

## 2020-06-22 RX ORDER — HEPARIN SODIUM 10000 [USP'U]/ML
5000 INJECTION, SOLUTION INTRAVENOUS; SUBCUTANEOUS EVERY 8 HOURS SCHEDULED
Status: DISCONTINUED | OUTPATIENT
Start: 2020-06-23 | End: 2020-06-24 | Stop reason: HOSPADM

## 2020-06-22 RX ORDER — SODIUM CHLORIDE 0.9 % (FLUSH) 0.9 %
10 SYRINGE (ML) INJECTION EVERY 12 HOURS SCHEDULED
Status: DISCONTINUED | OUTPATIENT
Start: 2020-06-22 | End: 2020-06-22 | Stop reason: SDUPTHER

## 2020-06-22 RX ORDER — CINACALCET 30 MG/1
30 TABLET, FILM COATED ORAL DAILY
Status: DISCONTINUED | OUTPATIENT
Start: 2020-06-22 | End: 2020-06-24 | Stop reason: HOSPADM

## 2020-06-22 RX ORDER — PROTAMINE SULFATE 10 MG/ML
INJECTION, SOLUTION INTRAVENOUS PRN
Status: DISCONTINUED | OUTPATIENT
Start: 2020-06-22 | End: 2020-06-22 | Stop reason: SDUPTHER

## 2020-06-22 RX ORDER — HYDRALAZINE HYDROCHLORIDE 25 MG/1
25 TABLET, FILM COATED ORAL 2 TIMES DAILY
Status: DISCONTINUED | OUTPATIENT
Start: 2020-06-22 | End: 2020-06-24

## 2020-06-22 RX ORDER — MORPHINE SULFATE 4 MG/ML
4 INJECTION, SOLUTION INTRAMUSCULAR; INTRAVENOUS
Status: DISCONTINUED | OUTPATIENT
Start: 2020-06-22 | End: 2020-06-22

## 2020-06-22 RX ORDER — METOPROLOL SUCCINATE 25 MG/1
25 TABLET, EXTENDED RELEASE ORAL DAILY
Status: DISCONTINUED | OUTPATIENT
Start: 2020-06-23 | End: 2020-06-24 | Stop reason: HOSPADM

## 2020-06-22 RX ORDER — OXYCODONE HYDROCHLORIDE 5 MG/1
10 TABLET ORAL EVERY 4 HOURS PRN
Status: DISCONTINUED | OUTPATIENT
Start: 2020-06-22 | End: 2020-06-24 | Stop reason: HOSPADM

## 2020-06-22 RX ORDER — ROCURONIUM BROMIDE 10 MG/ML
INJECTION, SOLUTION INTRAVENOUS PRN
Status: DISCONTINUED | OUTPATIENT
Start: 2020-06-22 | End: 2020-06-22 | Stop reason: SDUPTHER

## 2020-06-22 RX ORDER — HEPARIN SODIUM 1000 [USP'U]/ML
INJECTION, SOLUTION INTRAVENOUS; SUBCUTANEOUS PRN
Status: DISCONTINUED | OUTPATIENT
Start: 2020-06-22 | End: 2020-06-22 | Stop reason: SDUPTHER

## 2020-06-22 RX ORDER — DEXAMETHASONE SODIUM PHOSPHATE 10 MG/ML
INJECTION INTRAMUSCULAR; INTRAVENOUS PRN
Status: DISCONTINUED | OUTPATIENT
Start: 2020-06-22 | End: 2020-06-22 | Stop reason: SDUPTHER

## 2020-06-22 RX ORDER — LABETALOL HYDROCHLORIDE 5 MG/ML
INJECTION, SOLUTION INTRAVENOUS PRN
Status: DISCONTINUED | OUTPATIENT
Start: 2020-06-22 | End: 2020-06-22 | Stop reason: SDUPTHER

## 2020-06-22 RX ORDER — HYDRALAZINE HYDROCHLORIDE 25 MG/1
25 TABLET, FILM COATED ORAL ONCE
Status: COMPLETED | OUTPATIENT
Start: 2020-06-22 | End: 2020-06-22

## 2020-06-22 RX ORDER — METOPROLOL SUCCINATE 50 MG/1
25 TABLET, EXTENDED RELEASE ORAL ONCE
Status: COMPLETED | OUTPATIENT
Start: 2020-06-22 | End: 2020-06-22

## 2020-06-22 RX ORDER — NEOSTIGMINE METHYLSULFATE 1 MG/ML
INJECTION, SOLUTION INTRAVENOUS PRN
Status: DISCONTINUED | OUTPATIENT
Start: 2020-06-22 | End: 2020-06-22 | Stop reason: SDUPTHER

## 2020-06-22 RX ORDER — LISINOPRIL 5 MG/1
5 TABLET ORAL ONCE
Status: COMPLETED | OUTPATIENT
Start: 2020-06-22 | End: 2020-06-22

## 2020-06-22 RX ORDER — LISINOPRIL 5 MG/1
5 TABLET ORAL DAILY
Status: DISCONTINUED | OUTPATIENT
Start: 2020-06-23 | End: 2020-06-24

## 2020-06-22 RX ORDER — MEPERIDINE HYDROCHLORIDE 25 MG/ML
12.5 INJECTION INTRAMUSCULAR; INTRAVENOUS; SUBCUTANEOUS EVERY 5 MIN PRN
Status: DISCONTINUED | OUTPATIENT
Start: 2020-06-22 | End: 2020-06-22 | Stop reason: HOSPADM

## 2020-06-22 RX ORDER — MIDAZOLAM HYDROCHLORIDE 1 MG/ML
INJECTION INTRAMUSCULAR; INTRAVENOUS PRN
Status: DISCONTINUED | OUTPATIENT
Start: 2020-06-22 | End: 2020-06-22 | Stop reason: SDUPTHER

## 2020-06-22 RX ORDER — LABETALOL HYDROCHLORIDE 5 MG/ML
5 INJECTION, SOLUTION INTRAVENOUS EVERY 10 MIN PRN
Status: DISCONTINUED | OUTPATIENT
Start: 2020-06-22 | End: 2020-06-22 | Stop reason: HOSPADM

## 2020-06-22 RX ORDER — NICOTINE POLACRILEX 4 MG
15 LOZENGE BUCCAL PRN
Status: DISCONTINUED | OUTPATIENT
Start: 2020-06-22 | End: 2020-06-24 | Stop reason: HOSPADM

## 2020-06-22 RX ORDER — PROMETHAZINE HYDROCHLORIDE 25 MG/ML
25 INJECTION, SOLUTION INTRAMUSCULAR; INTRAVENOUS PRN
Status: DISCONTINUED | OUTPATIENT
Start: 2020-06-22 | End: 2020-06-22 | Stop reason: HOSPADM

## 2020-06-22 RX ORDER — ONDANSETRON 2 MG/ML
4 INJECTION INTRAMUSCULAR; INTRAVENOUS EVERY 6 HOURS PRN
Status: DISCONTINUED | OUTPATIENT
Start: 2020-06-22 | End: 2020-06-24 | Stop reason: HOSPADM

## 2020-06-22 RX ADMIN — LABETALOL HYDROCHLORIDE 5 MG: 5 INJECTION INTRAVENOUS at 10:55

## 2020-06-22 RX ADMIN — NITROGLYCERIN 50 MCG: 5 INJECTION, SOLUTION INTRAVENOUS at 11:12

## 2020-06-22 RX ADMIN — FENTANYL CITRATE 50 MCG: 50 INJECTION, SOLUTION INTRAMUSCULAR; INTRAVENOUS at 12:09

## 2020-06-22 RX ADMIN — MIDAZOLAM 2 MG: 1 INJECTION INTRAMUSCULAR; INTRAVENOUS at 10:14

## 2020-06-22 RX ADMIN — Medication 3 MG: at 12:32

## 2020-06-22 RX ADMIN — LIDOCAINE HYDROCHLORIDE 100 MG: 20 INJECTION, SOLUTION INTRAVENOUS at 10:18

## 2020-06-22 RX ADMIN — HYDRALAZINE HYDROCHLORIDE 25 MG: 25 TABLET, FILM COATED ORAL at 20:34

## 2020-06-22 RX ADMIN — FENTANYL CITRATE 50 MCG: 50 INJECTION, SOLUTION INTRAMUSCULAR; INTRAVENOUS at 11:07

## 2020-06-22 RX ADMIN — FENTANYL CITRATE 50 MCG: 50 INJECTION, SOLUTION INTRAMUSCULAR; INTRAVENOUS at 10:55

## 2020-06-22 RX ADMIN — ROCURONIUM BROMIDE 40 MG: 10 INJECTION, SOLUTION INTRAVENOUS at 10:18

## 2020-06-22 RX ADMIN — HYDRALAZINE HYDROCHLORIDE 25 MG: 25 TABLET, FILM COATED ORAL at 08:10

## 2020-06-22 RX ADMIN — ROCURONIUM BROMIDE 10 MG: 10 INJECTION, SOLUTION INTRAVENOUS at 11:29

## 2020-06-22 RX ADMIN — CEFAZOLIN SODIUM 2 G: 2 SOLUTION INTRAVENOUS at 22:14

## 2020-06-22 RX ADMIN — SODIUM CHLORIDE: 9 INJECTION, SOLUTION INTRAVENOUS at 10:14

## 2020-06-22 RX ADMIN — CLEVIPIDINE 4 MG/HR: 0.5 EMULSION INTRAVENOUS at 17:35

## 2020-06-22 RX ADMIN — CLEVIPIDINE 1 MG/HR: 0.5 EMULSION INTRAVENOUS at 12:35

## 2020-06-22 RX ADMIN — OXYCODONE HYDROCHLORIDE 10 MG: 5 TABLET ORAL at 20:34

## 2020-06-22 RX ADMIN — HYDROMORPHONE HYDROCHLORIDE 0.5 MG: 1 INJECTION, SOLUTION INTRAMUSCULAR; INTRAVENOUS; SUBCUTANEOUS at 16:16

## 2020-06-22 RX ADMIN — HYDROMORPHONE HYDROCHLORIDE 1 MG: 1 INJECTION, SOLUTION INTRAMUSCULAR; INTRAVENOUS; SUBCUTANEOUS at 18:36

## 2020-06-22 RX ADMIN — FENTANYL CITRATE 100 MCG: 50 INJECTION, SOLUTION INTRAMUSCULAR; INTRAVENOUS at 10:18

## 2020-06-22 RX ADMIN — FENTANYL CITRATE 50 MCG: 50 INJECTION, SOLUTION INTRAMUSCULAR; INTRAVENOUS at 12:34

## 2020-06-22 RX ADMIN — POTASSIUM CHLORIDE 10 MEQ: 10 INJECTION, SOLUTION INTRAVENOUS at 18:36

## 2020-06-22 RX ADMIN — CLEVIPIDINE 2 MG/HR: 0.5 EMULSION INTRAVENOUS at 13:33

## 2020-06-22 RX ADMIN — ONDANSETRON HYDROCHLORIDE 4 MG: 2 INJECTION, SOLUTION INTRAMUSCULAR; INTRAVENOUS at 12:10

## 2020-06-22 RX ADMIN — PROPOFOL 200 MG: 10 INJECTION, EMULSION INTRAVENOUS at 10:18

## 2020-06-22 RX ADMIN — CEFAZOLIN SODIUM 2 G: 2 SOLUTION INTRAVENOUS at 10:21

## 2020-06-22 RX ADMIN — PROTAMINE SULFATE 50 MG: 10 INJECTION, SOLUTION INTRAVENOUS at 12:29

## 2020-06-22 RX ADMIN — NITROGLYCERIN 50 MCG: 5 INJECTION, SOLUTION INTRAVENOUS at 10:47

## 2020-06-22 RX ADMIN — LISINOPRIL 5 MG: 5 TABLET ORAL at 08:10

## 2020-06-22 RX ADMIN — POTASSIUM CHLORIDE 10 MEQ: 10 INJECTION, SOLUTION INTRAVENOUS at 20:33

## 2020-06-22 RX ADMIN — DEXAMETHASONE SODIUM PHOSPHATE 10 MG: 10 INJECTION INTRAMUSCULAR; INTRAVENOUS at 10:18

## 2020-06-22 RX ADMIN — NITROGLYCERIN 50 MCG: 5 INJECTION, SOLUTION INTRAVENOUS at 10:56

## 2020-06-22 RX ADMIN — METOPROLOL SUCCINATE 25 MG: 50 TABLET, EXTENDED RELEASE ORAL at 08:07

## 2020-06-22 RX ADMIN — HEPARIN SODIUM 10000 UNITS: 1000 INJECTION INTRAVENOUS; SUBCUTANEOUS at 11:16

## 2020-06-22 RX ADMIN — Medication 0.6 MG: at 12:32

## 2020-06-22 RX ADMIN — CINACALCET HYDROCHLORIDE 30 MG: 30 TABLET, FILM COATED ORAL at 20:33

## 2020-06-22 ASSESSMENT — PULMONARY FUNCTION TESTS
PIF_VALUE: 22
PIF_VALUE: 0
PIF_VALUE: 1
PIF_VALUE: 2
PIF_VALUE: 2
PIF_VALUE: 10
PIF_VALUE: 0
PIF_VALUE: 24
PIF_VALUE: 20
PIF_VALUE: 9
PIF_VALUE: 9
PIF_VALUE: 0
PIF_VALUE: 21
PIF_VALUE: 22
PIF_VALUE: 0
PIF_VALUE: 23
PIF_VALUE: 38
PIF_VALUE: 20
PIF_VALUE: 22
PIF_VALUE: 24
PIF_VALUE: 11
PIF_VALUE: 9
PIF_VALUE: 21
PIF_VALUE: 3
PIF_VALUE: 5
PIF_VALUE: 23
PIF_VALUE: 23
PIF_VALUE: 2
PIF_VALUE: 18
PIF_VALUE: 22
PIF_VALUE: 30
PIF_VALUE: 20
PIF_VALUE: 23
PIF_VALUE: 20
PIF_VALUE: 10
PIF_VALUE: 22
PIF_VALUE: 36
PIF_VALUE: 23
PIF_VALUE: 21
PIF_VALUE: 21
PIF_VALUE: 9
PIF_VALUE: 3
PIF_VALUE: 9
PIF_VALUE: 23
PIF_VALUE: 21
PIF_VALUE: 24
PIF_VALUE: 3
PIF_VALUE: 23
PIF_VALUE: 11
PIF_VALUE: 22
PIF_VALUE: 2
PIF_VALUE: 23
PIF_VALUE: 2
PIF_VALUE: 22
PIF_VALUE: 21
PIF_VALUE: 20
PIF_VALUE: 1
PIF_VALUE: 21
PIF_VALUE: 21
PIF_VALUE: 22
PIF_VALUE: 22
PIF_VALUE: 20
PIF_VALUE: 21
PIF_VALUE: 14
PIF_VALUE: 10
PIF_VALUE: 20
PIF_VALUE: 5
PIF_VALUE: 24
PIF_VALUE: 20
PIF_VALUE: 23
PIF_VALUE: 9
PIF_VALUE: 23
PIF_VALUE: 17
PIF_VALUE: 20
PIF_VALUE: 23
PIF_VALUE: 4
PIF_VALUE: 23
PIF_VALUE: 23
PIF_VALUE: 14
PIF_VALUE: 22
PIF_VALUE: 4
PIF_VALUE: 10
PIF_VALUE: 22
PIF_VALUE: 23
PIF_VALUE: 9
PIF_VALUE: 20
PIF_VALUE: 23
PIF_VALUE: 22
PIF_VALUE: 21
PIF_VALUE: 16
PIF_VALUE: 23
PIF_VALUE: 21
PIF_VALUE: 21
PIF_VALUE: 23
PIF_VALUE: 9
PIF_VALUE: 9
PIF_VALUE: 23
PIF_VALUE: 22
PIF_VALUE: 23
PIF_VALUE: 20
PIF_VALUE: 22
PIF_VALUE: 23
PIF_VALUE: 20
PIF_VALUE: 23
PIF_VALUE: 0
PIF_VALUE: 3
PIF_VALUE: 23
PIF_VALUE: 10
PIF_VALUE: 0
PIF_VALUE: 9
PIF_VALUE: 22
PIF_VALUE: 22
PIF_VALUE: 1
PIF_VALUE: 0
PIF_VALUE: 20
PIF_VALUE: 22
PIF_VALUE: 9
PIF_VALUE: 22
PIF_VALUE: 20
PIF_VALUE: 23
PIF_VALUE: 10
PIF_VALUE: 22
PIF_VALUE: 21
PIF_VALUE: 3
PIF_VALUE: 20
PIF_VALUE: 22
PIF_VALUE: 20
PIF_VALUE: 21
PIF_VALUE: 22
PIF_VALUE: 9
PIF_VALUE: 21
PIF_VALUE: 11
PIF_VALUE: 21
PIF_VALUE: 20
PIF_VALUE: 20
PIF_VALUE: 22
PIF_VALUE: 23
PIF_VALUE: 20
PIF_VALUE: 0
PIF_VALUE: 0
PIF_VALUE: 22
PIF_VALUE: 21
PIF_VALUE: 22
PIF_VALUE: 20
PIF_VALUE: 23
PIF_VALUE: 8
PIF_VALUE: 23
PIF_VALUE: 11
PIF_VALUE: 11
PIF_VALUE: 9
PIF_VALUE: 9
PIF_VALUE: 22
PIF_VALUE: 0
PIF_VALUE: 11
PIF_VALUE: 21
PIF_VALUE: 3
PIF_VALUE: 21

## 2020-06-22 ASSESSMENT — PAIN SCALES - GENERAL
PAINLEVEL_OUTOF10: 0
PAINLEVEL_OUTOF10: 0
PAINLEVEL_OUTOF10: 7
PAINLEVEL_OUTOF10: 0
PAINLEVEL_OUTOF10: 0
PAINLEVEL_OUTOF10: 4
PAINLEVEL_OUTOF10: 0
PAINLEVEL_OUTOF10: 7
PAINLEVEL_OUTOF10: 7
PAINLEVEL_OUTOF10: 0

## 2020-06-22 ASSESSMENT — PAIN DESCRIPTION - PAIN TYPE
TYPE: SURGICAL PAIN

## 2020-06-22 ASSESSMENT — PAIN DESCRIPTION - ORIENTATION
ORIENTATION: LEFT;RIGHT
ORIENTATION: LEFT;RIGHT
ORIENTATION: RIGHT;LEFT

## 2020-06-22 ASSESSMENT — PAIN DESCRIPTION - DESCRIPTORS
DESCRIPTORS: ACHING;DISCOMFORT

## 2020-06-22 ASSESSMENT — PAIN DESCRIPTION - LOCATION
LOCATION: GROIN

## 2020-06-22 ASSESSMENT — LIFESTYLE VARIABLES: SMOKING_STATUS: 1

## 2020-06-22 ASSESSMENT — PAIN - FUNCTIONAL ASSESSMENT: PAIN_FUNCTIONAL_ASSESSMENT: 0-10

## 2020-06-22 NOTE — ANESTHESIA PRE PROCEDURE
Department of Anesthesiology  Preprocedure Note       Name:  Jessica Nicholson   Age:  48 y.o.  :  1965                                          MRN:  [id number]         Date:  2020      Surgeon: William Gregory):  Blair Boateng MD    Procedure: Procedure(s):  CYSTOSCOPY, RETROGRADE PYELOGRAM, TRANSURETHRAL RESECTION BLADDER TUMOR, INSTILLATION OF MITOMYCIN-C    Medications prior to admission:   Prior to Admission medications    Medication Sig Start Date End Date Taking? Authorizing Provider   hydrALAZINE (APRESOLINE) 25 MG tablet Take 1 tablet by mouth 2 times daily 20  Yes Srikanth Gomes MD   lisinopril (PRINIVIL;ZESTRIL) 5 MG tablet Take 1 tablet by mouth daily 20  Yes Srikanth Gomes MD   dronabinol (MARINOL) 5 MG capsule Take 5 mg by mouth daily.  20  Yes Historical Provider, MD   metoprolol succinate (TOPROL XL) 25 MG extended release tablet Take 1 tablet by mouth daily 20  Yes Srikanth Gomes MD   cinacalcet (SENSIPAR) 30 MG tablet Take 30 mg by mouth daily   Yes Historical Provider, MD   Blood Glucose Monitoring Suppl [facility] BLOOD GLUCOSE STARTER) W/DEVICE KIT Please supply 1 kit 12/4/15   Sha Daily DO   glucose blood VI test strips (ASCENSIA AUTODISC VI;ONE TOUCH ULTRA TEST VI) strip 1 box 12/4/15   Sha Daily DO       Current medications:    Current Facility-Administered Medications   Medication Dose Route Frequency Provider Last Rate Last Dose    [START ON 2020] metoprolol succinate (TOPROL XL) extended release tablet 25 mg  25 mg Oral Daily Yumiko Suazo DO        [START ON 2020] lisinopril (PRINIVIL;ZESTRIL) tablet 5 mg  5 mg Oral Daily Yumiko Suazo DO        hydrALAZINE (APRESOLINE) tablet 25 mg  25 mg Oral BID Yumiko Suazo DO        cinacalcet (SENSIPAR) tablet 30 mg  30 mg Oral Daily Yumiko Suazo DO        insulin lispro (HUMALOG) injection vial 0-12 Units  0-12 Units Subcutaneous 4x Daily AC & HS Kaleb ANAYA DO Mary        glucose (GLUTOSE) 40 % oral gel 15 g  15 g Oral PRN Jon Kasper, DO        dextrose 50 % IV solution  12.5 g Intravenous PRN Jon Kasper, DO        glucagon (rDNA) injection 1 mg  1 mg Intramuscular PRN Jon Kasper, DO        dextrose 5 % solution  100 mL/hr Intravenous PRN Romeoa Ranjith, DO        sodium chloride flush 0.9 % injection 10 mL  10 mL Intravenous 2 times per day Jon Kasper, DO        sodium chloride flush 0.9 % injection 10 mL  10 mL Intravenous PRN Romeoa Ranjith, DO        [START ON 6/23/2020] heparin (porcine) injection 5,000 Units  5,000 Units Subcutaneous 3 times per day Jon Kasper, DO        oxyCODONE (ROXICODONE) immediate release tablet 5 mg  5 mg Oral Q4H PRN Romeoa Ranjith, DO        Or    oxyCODONE (ROXICODONE) immediate release tablet 10 mg  10 mg Oral Q4H PRN Jon Kasper, DO        morphine (PF) injection 2 mg  2 mg Intravenous Q2H PRN Romeoa Ranjith, DO        Or    morphine sulfate (PF) injection 4 mg  4 mg Intravenous Q2H PRN Jon Kasper, DO        ondansetron [facility]) injection 4 mg  4 mg Intravenous Q6H PRN Jon Kasper, DO        clevidipine (CLEVIPREX) infusion  2 mg/hr Intravenous Continuous Jon Kasper DO 8 mL/hr at 06/22/20 1735 4 mg/hr at 06/22/20 1735    ceFAZolin (ANCEF) 2 g in dextrose 3 % 50 mL IVPB (duplex)  2 g Intravenous Once Georgi Peña MD           Allergies:     Allergies   Allergen Reactions    Vancomycin Itching       Problem List:    Patient Active Problem List   Diagnosis Code    Diabetes ([facility] [address] E11.9    Essential hypertension I10    Dyslipidemia associated with type 2 diabetes mellitus ([facility] [address] E11.69, E78.5    Abdominal aortic aneurysm without rupture ([facility] [address] I71.4    Acute renal failure ([facility] [address] N17.9    AAA (abdominal aortic aneurysm) without rupture (HCC) I71.4    Anemia D64.9    ESRD (end stage renal disease) (HCC) N18.6    Type 2 diabetes mellitus 06/22/20 (!) 152/98   05/11/20 (!) 150/104   04/10/20 (!) 159/108       NPO Status: Time of last liquid consumption: 2200                        Time of last solid consumption: 1000                        Date of last liquid consumption: 06/21/20                        Date of last solid food consumption: 06/21/20    BMI:   Wt Readings from Last 3 Encounters:   06/22/20 160 lb (72.6 kg)   05/20/20 160 lb (72.6 kg)   05/11/20 160 lb (72.6 kg)     Body mass index is 25.82 kg/m². CBC:   Lab Results   Component Value Date    WBC 4.5 06/22/2020    RBC 3.28 06/22/2020    HGB 9.2 06/22/2020    HCT 29.4 06/22/2020    MCV 89.6 06/22/2020    RDW 17.2 06/22/2020     06/22/2020       CMP:   Lab Results   Component Value Date     06/22/2020    K 3.0 06/22/2020    K 3.4 04/10/2020    CL 99 06/22/2020    CO2 27 06/22/2020    BUN 28 06/22/2020    CREATININE 10.5 06/22/2020    GFRAA 6 06/22/2020    LABGLOM 6 06/22/2020    GLUCOSE 112 06/22/2020    PROT 8.2 10/17/2019    CALCIUM 9.6 06/22/2020    BILITOT 0.5 10/17/2019    ALKPHOS 77 10/17/2019    AST 12 10/17/2019    ALT 6 10/17/2019       POC Tests: No results for input(s): POCGLU, POCNA, POCK, POCCL, POCBUN, POCHEMO, POCHCT in the last 72 hours.     Coags:   Lab Results   Component Value Date    PROTIME 15.0 06/22/2020    INR 1.3 06/22/2020    APTT 31.4 06/22/2020       HCG (If Applicable): No results found for: PREGTESTUR, PREGSERUM, HCG, HCGQUANT     ABGs:   Lab Results   Component Value Date    PO2ART 69.1 06/22/2020    SKK8ETZ 44.2 06/22/2020    EHT9OFN 27.3 06/22/2020        Type & Screen (If Applicable):  No results found for: LABABO, LABRH    Drug/Infectious Status (If Applicable):  No results found for: HIV, HEPCAB    COVID-19 Screening (If Applicable):   Lab Results   Component Value Date    COVID19 Not Detected 06/19/2020     EKG 4/10/20  Narrative & Impression     Sinus rhythm with sinus arrhythmia with occasional premature ventricular complexes  Left ventricular hypertrophy with repolarization abnormality  Abnormal ECG        ECHO 5/11/20   Conclusions      Summary   Mild left ventricular chamber dilatation. Moderate to severe global hypokinesis, EF estimated about 35-38%. Stage III diastolic dysfunction. Left atrium is enlarged. Interatrial septum not well visualized but appears intact. Normal right ventricle structure and function. There is moderate mitral regurgitation - ERO 0.2cm2, PISA 0.7, RV 33cc. No mitral valve prolapse. The aortic valve appears mildly sclerotic. There is mild to moderate tricuspid regurgitation. Moderate to severe pulmonary hypertension, RVSP 68mmHg. Normal aortic root size and ascending aorta. No evidence of pericardial effusion. Pericardium appears normal.   No intracardiac mass. Compared to prior echo from 11/2015 which showed - EF 40%, RVSP 70mmHg,   small to moderate pericardial effusion. CXR 10/17/19  Impression   Cardiomegaly with signs of impending volume overload as   above discussed. CT Abdomen Pelvis 3/4/20  Impression       Fusiform aneurysmal dilation of the thoracoabdominal aorta and its   branches are once again seen. There is no evidence of dissection. Abdominal aorta measures approximately 6 cm in greatest cross-section   over length of approximately 10 cm. Iliac arteries are over 1.5 cm in   cross-section.       Kidneys are abnormal with multiple low-density lesions which appear to   be benign cysts. There are a few of these there are new compared to   prior exam of 2016 and some have shown interval growth. There is no   convincing evidence for solid renal mass.       Enlarged prostate. Cardiac Stress 5/11/20  Impression:    1. Electrocardiographically normal regadenoson infusion with a   clinicallynonischemic response. 2. Myocardial perfusion imaging showed no reversible ischemia.    3.  Overall left ventricular systolic function reduced at 25-30%   with moderate to severe

## 2020-06-22 NOTE — H&P
Vascular Surgery History & Physical Exam      Chief Complaint: AAA    HISTORY OF PRESENT ILLNESS:                The patient is a 48 y.o. male who presents to the hospital for elective EVAR. He denies any problems since the last office visit. Last dialysis Saturday. IMPRESSION:   Active Hospital Problems    Diagnosis    Abdominal aortic aneurysm (AAA) without rupture ([address] [I71.4]    Chronic renal failure, stage 5 ([address] [N18.5]    Abdominal aortic aneurysm without rupture ([address] [I71.4]       PLAN:  EVAR. I reviewed the procedure with the patient. I discussed the risks, benefits, and alternatives of the procedure. The patient understands and consents. All questions were answered.         Past Medical History:   Diagnosis Date    Abdominal aortic aneurysm without rupture ([address] 11/14/2015    Chronic renal failure, stage 5 (HCC) 2/19/2016    Diabetes ([address]     Essential hypertension 3/1/2019    [facility]od[facility]     Hyperlipidemia associated with type 2 diabetes mellitus ([address]     Hypertension     Vitamin D deficiency         Past Surgical History:   Procedure Laterality Date    CYST REMOVAL      abdominal cyst    DIALYSIS FISTULA CREATION Right 02/19/2016    Ashly LOVE       Current Medications:     Current Facility-Administered Medications:     0.9 % sodium chloride infusion, , Intravenous, Continuous, Stewart Sher MD    sodium chloride flush 0.9 % injection 10 mL, 10 mL, Intravenous, 2 times per day, Stewart Sher MD    sodium chloride flush 0.9 % injection 10 mL, 10 mL, Intravenous, PRN, Stewart Sher MD    ceFAZolin (ANCEF) 2 g in dextrose 3 % 50 mL IVPB (duplex), 2 g, Intravenous, On Call to See Tinoco MD    Allergies:  Vancomycin    Social History     Socioeconomic History    Marital status: Single     Spouse name: Not on file    Number of children: Not on file    Years of education: Not on file    Highest education level: Not on file Occupational History    Occupation: unemployed   Social Needs    Financial resource strain: Not on file    Food insecurity     Worry: Not on file     Inability: Not on file   [employer] needs     Medical: Not on file     Non-medical: Not on file   Tobacco Use    Smoking status: Former Smoker     Packs/day: 1.00     Years: 20.00     Pack years: 20.00     Types: Cigarettes     Last attempt to quit: 2013     Years since quittin.6    Smokeless tobacco: Never Used   Substance and Sexual Activity    Alcohol use: No     Alcohol/week: 0.0 standard drinks     Comment: no caffeine    Drug use: Yes     Frequency: 7.0 times per week     Types: Marijuana     Comment: uses 3-4 times/day    Sexual activity: Yes     Partners: Female     Comment: occassional   Lifestyle    Physical activity     Days per week: Not on file     Minutes per session: Not on file    Stress: Not on file   Relationships    Social connections     Talks on phone: Not on file     Gets together: Not on file     Attends Sikh service: Not on file     Active member of club or organization: Not on file     Attends meetings of clubs or organizations: Not on file     Relationship status: Not on file    Intimate partner violence     Fear of current or ex partner: Not on file     Emotionally abused: Not on file     Physically abused: Not on file     Forced sexual activity: Not on file   Other Topics Concern    Not on file   Social History Narrative    Denies caffeine. Family History   Problem Relation Age of Onset    Depression Maternal Grandmother     Cancer Maternal Grandmother     Depression Maternal Uncle     Cancer Maternal Uncle        REVIEW OF SYSTEMS:  The chart was reviewed.     PHYSICAL EXAM:    Vitals:    20 0855   BP: (!) 186/116   Pulse:    Resp:    Temp:    SpO2:      CONSTITUTIONAL:  awake, alert, cooperative, no apparent distress, and appears stated age  NECK:  supple, symmetrical, trachea
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
## 2020-06-22 NOTE — OP NOTE
Operative Note      Patient: Ami Huerta  YOB: 1965  MRN: [id number]    Date of Procedure: 6/22/2020    Pre-Op Diagnosis: AAA without rupture, bladder tumor    Post-Op Diagnosis: Same       Procedure(s):  CYSTOSCOPY of BLADDER TUMOR  ABDOMINAL AORTIC ANEURYSM REPAIR ENDOVASCULAR, bilateral femoral artery exposure. Surgeon(s):  MD Mona Pfeiffer MD    Assistant:   Surgical Assistant: Kaylee Arizmendi  Resident: Warden Gabbi DO    Anesthesia: General    Estimated Blood Loss (mL): less than 178 ml    Complications: None    Specimens:   * No specimens in log *    Implants:  Implant Name Type Inv. Item Serial No.  Lot No. LRB No. Used Action   GRAFT STENT CV 28MM 96MM AAA FLEX BODY BIFUR Stent:Biliary/Pancreatic/Iliac GRAFT STENT CV 28MM 96MM AAA FLEX BODY BIFUR  [employer] [id number] N/A 1 Implanted   GRAFT STENT SPIRAL Z  ZENITH  [id number] ILIAC LEG Stent:Biliary/Pancreatic/Iliac GRAFT STENT SPIRAL Z  ZENITH  [id number] ILIAC LEG  [employer] [id number] Left 1 Implanted   GRAFT STENT SPIRAL Z ZENITH [id number] ILIAC LEG Stent:Biliary/Pancreatic/Iliac GRAFT STENT SPIRAL Z ZENITH [id number] ILIAC LEG  [employer] [id number] Right 1 Implanted         Drains:   Urethral Catheter Non-latex 16 fr (Active)       Findings:     Detailed Description of Procedure: The patient was identified and the procedure was confirmed. The abdomen, groins and thighs were prepped and draped in the usual sterile fashion. Bilateral infrainguinal oblique groin incisions were made and carried down through the subcutaneous tissue. Dissection continued down to the inguinal ligaments, and the common femoral arteries were identified and freed from the surrounding tissues. The vessels were surrounded proximally and distally with Vesseloops for control. Inferior stab incisions were made for passage of the wires.  The patient was heparinized maintaining an activated clotting time greater than 300 saline solution. The incisions were approximated with multiple layers of Vicryl suture and Dermabond was applied to the skin incisions in the operating room. Needle, sponge and instrument counts were reported correct x2. The patient tolerated the procedure and was transferred to the CV-ICU in satisfactory condition.     Raymond Manisha Lmion    Electronically signed by Nasrin Bermudez MD on 6/22/2020 at 12:37 PM

## 2020-06-23 ENCOUNTER — APPOINTMENT (OUTPATIENT)
Dept: GENERAL RADIOLOGY | Age: 55
DRG: 182 | End: 2020-06-23
Attending: UROLOGY
Payer: COMMERCIAL

## 2020-06-23 ENCOUNTER — ANESTHESIA (OUTPATIENT)
Dept: OPERATING ROOM | Age: 55
DRG: 182 | End: 2020-06-23
Payer: COMMERCIAL

## 2020-06-23 VITALS — OXYGEN SATURATION: 97 %

## 2020-06-23 LAB
ALBUMIN SERPL-MCNC: 3.7 G/DL (ref 3.5–5.2)
ALP BLD-CCNC: 50 U/L (ref 40–129)
ALT SERPL-CCNC: 7 U/L (ref 0–40)
ANION GAP SERPL CALCULATED.3IONS-SCNC: 20 MMOL/L (ref 7–16)
AST SERPL-CCNC: 10 U/L (ref 0–39)
BASOPHILS ABSOLUTE: 0.03 E9/L (ref 0–0.2)
BASOPHILS RELATIVE PERCENT: 0.5 % (ref 0–2)
BILIRUB SERPL-MCNC: 0.3 MG/DL (ref 0–1.2)
BUN BLDV-MCNC: 37 MG/DL (ref 6–20)
CALCIUM SERPL-MCNC: 8.8 MG/DL (ref 8.6–10.2)
CHLORIDE BLD-SCNC: 97 MMOL/L (ref 98–107)
CO2: 23 MMOL/L (ref 22–29)
CREAT SERPL-MCNC: 12.1 MG/DL (ref 0.7–1.2)
EOSINOPHILS ABSOLUTE: 0.11 E9/L (ref 0.05–0.5)
EOSINOPHILS RELATIVE PERCENT: 1.7 % (ref 0–6)
GFR AFRICAN AMERICAN: 5
GFR NON-AFRICAN AMERICAN: 5 ML/MIN/1.73
GLUCOSE BLD-MCNC: 100 MG/DL (ref 74–99)
HCT VFR BLD CALC: 26.5 % (ref 37–54)
HEMOGLOBIN: 8.6 G/DL (ref 12.5–16.5)
IMMATURE GRANULOCYTES #: 0.02 E9/L
IMMATURE GRANULOCYTES %: 0.3 % (ref 0–5)
LYMPHOCYTES ABSOLUTE: 1.13 E9/L (ref 1.5–4)
LYMPHOCYTES RELATIVE PERCENT: 17.4 % (ref 20–42)
MCH RBC QN AUTO: 28.7 PG (ref 26–35)
MCHC RBC AUTO-ENTMCNC: 32.5 % (ref 32–34.5)
MCV RBC AUTO: 88.3 FL (ref 80–99.9)
METER GLUCOSE: 140 MG/DL (ref 74–99)
METER GLUCOSE: 80 MG/DL (ref 74–99)
METER GLUCOSE: 95 MG/DL (ref 74–99)
MONOCYTES ABSOLUTE: 0.54 E9/L (ref 0.1–0.95)
MONOCYTES RELATIVE PERCENT: 8.3 % (ref 2–12)
MRSA CULTURE ONLY: NORMAL
NEUTROPHILS ABSOLUTE: 4.67 E9/L (ref 1.8–7.3)
NEUTROPHILS RELATIVE PERCENT: 71.8 % (ref 43–80)
PDW BLD-RTO: 16.9 FL (ref 11.5–15)
PHOSPHORUS: 2.2 MG/DL (ref 2.5–4.5)
PLATELET # BLD: 132 E9/L (ref 130–450)
PMV BLD AUTO: 10.5 FL (ref 7–12)
POC ACT LR: 170 SECONDS
POC ACT LR: 181 SECONDS
POC ACT LR: 247 SECONDS
POC ACT LR: 341 SECONDS
POTASSIUM REFLEX MAGNESIUM: 3.6 MMOL/L (ref 3.5–5)
RBC # BLD: 3 E12/L (ref 3.8–5.8)
SODIUM BLD-SCNC: 140 MMOL/L (ref 132–146)
TOTAL PROTEIN: 6.5 G/DL (ref 6.4–8.3)
WBC # BLD: 6.5 E9/L (ref 4.5–11.5)

## 2020-06-23 PROCEDURE — 80053 COMPREHEN METABOLIC PANEL: CPT

## 2020-06-23 PROCEDURE — 5A1D70Z PERFORMANCE OF URINARY FILTRATION, INTERMITTENT, LESS THAN 6 HOURS PER DAY: ICD-10-PCS | Performed by: UROLOGY

## 2020-06-23 PROCEDURE — 0TCB8ZZ EXTIRPATION OF MATTER FROM BLADDER, VIA NATURAL OR ARTIFICIAL OPENING ENDOSCOPIC: ICD-10-PCS | Performed by: UROLOGY

## 2020-06-23 PROCEDURE — 6370000000 HC RX 637 (ALT 250 FOR IP): Performed by: UROLOGY

## 2020-06-23 PROCEDURE — 3209999900 FLUORO FOR SURGICAL PROCEDURES

## 2020-06-23 PROCEDURE — 88307 TISSUE EXAM BY PATHOLOGIST: CPT

## 2020-06-23 PROCEDURE — 84100 ASSAY OF PHOSPHORUS: CPT

## 2020-06-23 PROCEDURE — 3700000001 HC ADD 15 MINUTES (ANESTHESIA): Performed by: UROLOGY

## 2020-06-23 PROCEDURE — 2580000003 HC RX 258: Performed by: NURSE ANESTHETIST, CERTIFIED REGISTERED

## 2020-06-23 PROCEDURE — 6370000000 HC RX 637 (ALT 250 FOR IP): Performed by: STUDENT IN AN ORGANIZED HEALTH CARE EDUCATION/TRAINING PROGRAM

## 2020-06-23 PROCEDURE — 2580000003 HC RX 258: Performed by: STUDENT IN AN ORGANIZED HEALTH CARE EDUCATION/TRAINING PROGRAM

## 2020-06-23 PROCEDURE — 0T5B8ZZ DESTRUCTION OF BLADDER, VIA NATURAL OR ARTIFICIAL OPENING ENDOSCOPIC: ICD-10-PCS | Performed by: UROLOGY

## 2020-06-23 PROCEDURE — 36415 COLL VENOUS BLD VENIPUNCTURE: CPT

## 2020-06-23 PROCEDURE — C9248 INJ, CLEVIDIPINE BUTYRATE: HCPCS | Performed by: STUDENT IN AN ORGANIZED HEALTH CARE EDUCATION/TRAINING PROGRAM

## 2020-06-23 PROCEDURE — 6360000002 HC RX W HCPCS: Performed by: STUDENT IN AN ORGANIZED HEALTH CARE EDUCATION/TRAINING PROGRAM

## 2020-06-23 PROCEDURE — 85025 COMPLETE CBC W/AUTO DIFF WBC: CPT

## 2020-06-23 PROCEDURE — 2700000000 HC OXYGEN THERAPY PER DAY

## 2020-06-23 PROCEDURE — 0TBB8ZZ EXCISION OF BLADDER, VIA NATURAL OR ARTIFICIAL OPENING ENDOSCOPIC: ICD-10-PCS | Performed by: UROLOGY

## 2020-06-23 PROCEDURE — 6360000002 HC RX W HCPCS: Performed by: UROLOGY

## 2020-06-23 PROCEDURE — 2580000003 HC RX 258: Performed by: UROLOGY

## 2020-06-23 PROCEDURE — 82962 GLUCOSE BLOOD TEST: CPT

## 2020-06-23 PROCEDURE — 3E0K805 INTRODUCTION OF OTHER ANTINEOPLASTIC INTO GENITOURINARY TRACT, VIA NATURAL OR ARTIFICIAL OPENING ENDOSCOPIC: ICD-10-PCS | Performed by: UROLOGY

## 2020-06-23 PROCEDURE — 3600000003 HC SURGERY LEVEL 3 BASE: Performed by: UROLOGY

## 2020-06-23 PROCEDURE — C9248 INJ, CLEVIDIPINE BUTYRATE: HCPCS | Performed by: UROLOGY

## 2020-06-23 PROCEDURE — 3700000000 HC ANESTHESIA ATTENDED CARE: Performed by: UROLOGY

## 2020-06-23 PROCEDURE — 6370000000 HC RX 637 (ALT 250 FOR IP): Performed by: NURSE PRACTITIONER

## 2020-06-23 PROCEDURE — BT141ZZ FLUOROSCOPY OF KIDNEYS, URETERS AND BLADDER USING LOW OSMOLAR CONTRAST: ICD-10-PCS | Performed by: UROLOGY

## 2020-06-23 PROCEDURE — C1758 CATHETER, URETERAL: HCPCS | Performed by: UROLOGY

## 2020-06-23 PROCEDURE — 6360000002 HC RX W HCPCS: Performed by: NURSE ANESTHETIST, CERTIFIED REGISTERED

## 2020-06-23 PROCEDURE — 90935 HEMODIALYSIS ONE EVALUATION: CPT

## 2020-06-23 PROCEDURE — 3600000013 HC SURGERY LEVEL 3 ADDTL 15MIN: Performed by: UROLOGY

## 2020-06-23 PROCEDURE — 2709999900 HC NON-CHARGEABLE SUPPLY: Performed by: UROLOGY

## 2020-06-23 PROCEDURE — 2000000000 HC ICU R&B

## 2020-06-23 RX ORDER — PROPOFOL 10 MG/ML
INJECTION, EMULSION INTRAVENOUS PRN
Status: DISCONTINUED | OUTPATIENT
Start: 2020-06-23 | End: 2020-06-23 | Stop reason: SDUPTHER

## 2020-06-23 RX ORDER — LIDOCAINE HYDROCHLORIDE 20 MG/ML
JELLY TOPICAL PRN
Status: DISCONTINUED | OUTPATIENT
Start: 2020-06-23 | End: 2020-06-24 | Stop reason: HOSPADM

## 2020-06-23 RX ORDER — CEFAZOLIN SODIUM 1 G/3ML
INJECTION, POWDER, FOR SOLUTION INTRAMUSCULAR; INTRAVENOUS PRN
Status: DISCONTINUED | OUTPATIENT
Start: 2020-06-23 | End: 2020-06-23 | Stop reason: SDUPTHER

## 2020-06-23 RX ORDER — MIDAZOLAM HYDROCHLORIDE 1 MG/ML
INJECTION INTRAMUSCULAR; INTRAVENOUS PRN
Status: DISCONTINUED | OUTPATIENT
Start: 2020-06-23 | End: 2020-06-23 | Stop reason: SDUPTHER

## 2020-06-23 RX ORDER — ATROPA BELLADONNA AND OPIUM 16.2; 6 MG/1; MG/1
60 SUPPOSITORY RECTAL EVERY 8 HOURS PRN
Status: DISCONTINUED | OUTPATIENT
Start: 2020-06-23 | End: 2020-06-24 | Stop reason: HOSPADM

## 2020-06-23 RX ORDER — PROPOFOL 10 MG/ML
INJECTION, EMULSION INTRAVENOUS CONTINUOUS PRN
Status: DISCONTINUED | OUTPATIENT
Start: 2020-06-23 | End: 2020-06-23 | Stop reason: SDUPTHER

## 2020-06-23 RX ORDER — OXYCODONE HYDROCHLORIDE 5 MG/1
5 TABLET ORAL EVERY 6 HOURS PRN
Qty: 12 TABLET | Refills: 0 | Status: SHIPPED | OUTPATIENT
Start: 2020-06-23 | End: 2020-06-26

## 2020-06-23 RX ORDER — SODIUM CHLORIDE 9 MG/ML
INJECTION, SOLUTION INTRAVENOUS CONTINUOUS PRN
Status: DISCONTINUED | OUTPATIENT
Start: 2020-06-23 | End: 2020-06-23 | Stop reason: SDUPTHER

## 2020-06-23 RX ORDER — FENTANYL CITRATE 50 UG/ML
INJECTION, SOLUTION INTRAMUSCULAR; INTRAVENOUS PRN
Status: DISCONTINUED | OUTPATIENT
Start: 2020-06-23 | End: 2020-06-23 | Stop reason: SDUPTHER

## 2020-06-23 RX ADMIN — CLEVIPIDINE 6 MG/HR: 0.5 EMULSION INTRAVENOUS at 13:18

## 2020-06-23 RX ADMIN — HYDRALAZINE HYDROCHLORIDE 25 MG: 25 TABLET, FILM COATED ORAL at 08:03

## 2020-06-23 RX ADMIN — FENTANYL CITRATE 100 MCG: 50 INJECTION, SOLUTION INTRAMUSCULAR; INTRAVENOUS at 14:53

## 2020-06-23 RX ADMIN — PROPOFOL 80 MCG/KG/MIN: 10 INJECTION, EMULSION INTRAVENOUS at 14:46

## 2020-06-23 RX ADMIN — METOPROLOL SUCCINATE 25 MG: 25 TABLET, EXTENDED RELEASE ORAL at 08:04

## 2020-06-23 RX ADMIN — CEFAZOLIN 2000 MG: 1 INJECTION, POWDER, FOR SOLUTION INTRAMUSCULAR; INTRAVENOUS at 14:44

## 2020-06-23 RX ADMIN — Medication 10 ML: at 21:00

## 2020-06-23 RX ADMIN — MITOMYCIN 40 MG: 20 INJECTION, POWDER, LYOPHILIZED, FOR SOLUTION INTRAVENOUS at 15:41

## 2020-06-23 RX ADMIN — MIDAZOLAM 2 MG: 1 INJECTION INTRAMUSCULAR; INTRAVENOUS at 14:35

## 2020-06-23 RX ADMIN — PROPOFOL 50 MG: 10 INJECTION, EMULSION INTRAVENOUS at 14:46

## 2020-06-23 RX ADMIN — FENTANYL CITRATE 50 MCG: 50 INJECTION, SOLUTION INTRAMUSCULAR; INTRAVENOUS at 15:19

## 2020-06-23 RX ADMIN — SODIUM CHLORIDE: 0.9 INJECTION, SOLUTION INTRAVENOUS at 14:28

## 2020-06-23 RX ADMIN — LIDOCAINE HYDROCHLORIDE: 20 JELLY TOPICAL at 08:03

## 2020-06-23 RX ADMIN — FENTANYL CITRATE 100 MCG: 50 INJECTION, SOLUTION INTRAMUSCULAR; INTRAVENOUS at 14:45

## 2020-06-23 RX ADMIN — PROPOFOL 100 MG: 10 INJECTION, EMULSION INTRAVENOUS at 14:40

## 2020-06-23 RX ADMIN — CLEVIPIDINE 16 MG/HR: 0.5 EMULSION INTRAVENOUS at 19:50

## 2020-06-23 RX ADMIN — Medication 10 ML: at 08:04

## 2020-06-23 RX ADMIN — PROPOFOL 40 MG: 10 INJECTION, EMULSION INTRAVENOUS at 14:55

## 2020-06-23 RX ADMIN — CLEVIPIDINE 8 MG/HR: 0.5 EMULSION INTRAVENOUS at 17:44

## 2020-06-23 RX ADMIN — CINACALCET HYDROCHLORIDE 30 MG: 30 TABLET, FILM COATED ORAL at 08:03

## 2020-06-23 RX ADMIN — INSULIN LISPRO 2 UNITS: 100 INJECTION, SOLUTION INTRAVENOUS; SUBCUTANEOUS at 17:56

## 2020-06-23 RX ADMIN — HYDRALAZINE HYDROCHLORIDE 25 MG: 25 TABLET, FILM COATED ORAL at 20:45

## 2020-06-23 RX ADMIN — CLEVIPIDINE 14 MG/HR: 0.5 EMULSION INTRAVENOUS at 21:30

## 2020-06-23 RX ADMIN — FENTANYL CITRATE 100 MCG: 50 INJECTION, SOLUTION INTRAMUSCULAR; INTRAVENOUS at 15:10

## 2020-06-23 RX ADMIN — PROPOFOL 40 MG: 10 INJECTION, EMULSION INTRAVENOUS at 15:10

## 2020-06-23 RX ADMIN — HEPARIN SODIUM 5000 UNITS: 10000 INJECTION, SOLUTION INTRAVENOUS; SUBCUTANEOUS at 21:56

## 2020-06-23 RX ADMIN — LISINOPRIL 5 MG: 5 TABLET ORAL at 08:03

## 2020-06-23 RX ADMIN — HEPARIN SODIUM 5000 UNITS: 10000 INJECTION, SOLUTION INTRAVENOUS; SUBCUTANEOUS at 13:17

## 2020-06-23 RX ADMIN — CLEVIPIDINE 2 MG/HR: 0.5 EMULSION INTRAVENOUS at 09:31

## 2020-06-23 RX ADMIN — CLEVIPIDINE 2 MG/HR: 0.5 EMULSION INTRAVENOUS at 00:17

## 2020-06-23 ASSESSMENT — PULMONARY FUNCTION TESTS
PIF_VALUE: 0
PIF_VALUE: 1
PIF_VALUE: 0
PIF_VALUE: 1
PIF_VALUE: 0
PIF_VALUE: 1
PIF_VALUE: 0
PIF_VALUE: 1
PIF_VALUE: 0
PIF_VALUE: 1

## 2020-06-23 ASSESSMENT — PAIN SCALES - GENERAL
PAINLEVEL_OUTOF10: 0

## 2020-06-23 NOTE — BRIEF OP NOTE
Brief Postoperative Note      Patient: Chinedu Peña  YOB: 1965  MRN: [id number]    Date of Procedure: 6/23/2020    Pre-Op Diagnosis: Gross hematuria, clot retention, bladder cancer    Post-Op Diagnosis: Same       Procedure(s):  CYSTOSCOPY, RETROGRADE PYELOGRAM, clot evacuation, TRANSURETHRAL RESECTION BLADDER TUMOR, INSTILLATION OF MITOMYCIN-C, rectal exam    Surgeon(s):  Won Miles MD    Assistant:  * No surgical staff found *    Anesthesia: Monitor Anesthesia Care    Estimated Blood Loss (mL): Minimal    Complications: None    Specimens:   ID Type Source Tests Collected by Time Destination   A : BLADDER TUMOR Tissue Tissue SURGICAL PATHOLOGY Won Miles MD 6/23/2020 1537        Implants:  * No implants in log *      Drains:   Urethral Catheter  22 fr (Active)       [REMOVED] Urethral Catheter Non-latex 16 fr (Removed)       Findings: Bladder tumors    Electronically signed by Won Miles MD on 6/23/2020 at 3:59 PM

## 2020-06-23 NOTE — ANESTHESIA POSTPROCEDURE EVALUATION
Department of Anesthesiology  Postprocedure Note    Patient: Taye Cabral  MRN: [id number]  YOB: 1965  Date of evaluation: 6/23/2020  Time:  7:15 AM     Procedure Summary     Date:  06/22/20 Room / Location:  [address] [place] [address] OR 03 / [facility]    Anesthesia Start:  1014 Anesthesia Stop:  1255    Procedures:       CYSTOSCOPY of BLADDER TUMOR (N/A )      ABDOMINAL AORTIC ANEURYSM REPAIR ENDOVASCULAR (N/A ) Diagnosis:  (GROSS HEMATURIA)    Surgeon:  Renea Delgado MD; Farhana Coleman MD Responsible Provider:  Aba Weir MD    Anesthesia Type:  general ASA Status:  4          Anesthesia Type: general    Radhames Phase I: Radhames Score: 8    Radhames Phase II:      Last vitals: Reviewed and per EMR flowsheets.        Anesthesia Post Evaluation    Patient location during evaluation: PACU  Patient participation: complete - patient participated  Level of consciousness: awake  Airway patency: patent  Nausea & Vomiting: no nausea and no vomiting  Complications: no  Cardiovascular status: hemodynamically stable  Respiratory status: acceptable  Hydration status: stable
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
## 2020-06-23 NOTE — FLOWSHEET NOTE
06/23/20 1222   Vital Signs   BP - Standing 172/82   Pulse 74   Resp 20   SpO2 100 %   Pain Assessment   Pain Assessment 0-10   Pain Level 0   Post-Hemodialysis Assessment   Post-Treatment Procedures Blood returned; Access bleeding time < 10 minutes   Machine Disinfection Process Exterior Machine Disinfection   Rinseback Volume (ml) 300 ml   Total Liters Processed (l/min) 86.7 l/min   Dialyzer Clearance Lightly streaked   Duration of Treatment (minutes) 240 minutes   Hemodialysis Intake (ml) 300 ml   Hemodialysis Output (ml) 2000 ml   NET Removed (ml) 1700 ml   Tolerated Treatment Good   Patient Response to Treatment tolerated tx. vss. uf 1700   Bilateral Breath Sounds Clear   Edema None

## 2020-06-24 VITALS
HEART RATE: 94 BPM | BODY MASS INDEX: 29.89 KG/M2 | HEIGHT: 66 IN | SYSTOLIC BLOOD PRESSURE: 166 MMHG | DIASTOLIC BLOOD PRESSURE: 108 MMHG | OXYGEN SATURATION: 94 % | WEIGHT: 186 LBS | RESPIRATION RATE: 19 BRPM | TEMPERATURE: 98.7 F

## 2020-06-24 LAB
METER GLUCOSE: 127 MG/DL (ref 74–99)
METER GLUCOSE: 95 MG/DL (ref 74–99)

## 2020-06-24 PROCEDURE — 2580000003 HC RX 258: Performed by: UROLOGY

## 2020-06-24 PROCEDURE — C9248 INJ, CLEVIDIPINE BUTYRATE: HCPCS | Performed by: UROLOGY

## 2020-06-24 PROCEDURE — 6360000002 HC RX W HCPCS: Performed by: INTERNAL MEDICINE

## 2020-06-24 PROCEDURE — 2580000003 HC RX 258: Performed by: INTERNAL MEDICINE

## 2020-06-24 PROCEDURE — 2500000003 HC RX 250 WO HCPCS: Performed by: INTERNAL MEDICINE

## 2020-06-24 PROCEDURE — 97530 THERAPEUTIC ACTIVITIES: CPT

## 2020-06-24 PROCEDURE — 6370000000 HC RX 637 (ALT 250 FOR IP): Performed by: UROLOGY

## 2020-06-24 PROCEDURE — 82962 GLUCOSE BLOOD TEST: CPT

## 2020-06-24 PROCEDURE — 97535 SELF CARE MNGMENT TRAINING: CPT

## 2020-06-24 PROCEDURE — 6360000002 HC RX W HCPCS: Performed by: STUDENT IN AN ORGANIZED HEALTH CARE EDUCATION/TRAINING PROGRAM

## 2020-06-24 PROCEDURE — 2700000000 HC OXYGEN THERAPY PER DAY

## 2020-06-24 PROCEDURE — 97162 PT EVAL MOD COMPLEX 30 MIN: CPT

## 2020-06-24 PROCEDURE — 97166 OT EVAL MOD COMPLEX 45 MIN: CPT

## 2020-06-24 PROCEDURE — 6370000000 HC RX 637 (ALT 250 FOR IP): Performed by: INTERNAL MEDICINE

## 2020-06-24 PROCEDURE — 6360000002 HC RX W HCPCS: Performed by: UROLOGY

## 2020-06-24 PROCEDURE — 2500000003 HC RX 250 WO HCPCS: Performed by: STUDENT IN AN ORGANIZED HEALTH CARE EDUCATION/TRAINING PROGRAM

## 2020-06-24 RX ORDER — HYDRALAZINE HYDROCHLORIDE 50 MG/1
50 TABLET, FILM COATED ORAL 2 TIMES DAILY
Qty: 90 TABLET | Refills: 3 | Status: SHIPPED | OUTPATIENT
Start: 2020-06-24 | End: 2021-01-01

## 2020-06-24 RX ORDER — SODIUM CHLORIDE 0.9 % (FLUSH) 0.9 %
10 SYRINGE (ML) INJECTION PRN
Status: DISCONTINUED | OUTPATIENT
Start: 2020-06-24 | End: 2020-06-24 | Stop reason: HOSPADM

## 2020-06-24 RX ORDER — LISINOPRIL 20 MG/1
20 TABLET ORAL DAILY
Status: DISCONTINUED | OUTPATIENT
Start: 2020-06-25 | End: 2020-06-24 | Stop reason: HOSPADM

## 2020-06-24 RX ORDER — SODIUM CHLORIDE 0.9 % (FLUSH) 0.9 %
10 SYRINGE (ML) INJECTION EVERY 12 HOURS SCHEDULED
Status: DISCONTINUED | OUTPATIENT
Start: 2020-06-24 | End: 2020-06-24 | Stop reason: HOSPADM

## 2020-06-24 RX ORDER — HYDRALAZINE HYDROCHLORIDE 20 MG/ML
10 INJECTION INTRAMUSCULAR; INTRAVENOUS EVERY 4 HOURS PRN
Status: DISCONTINUED | OUTPATIENT
Start: 2020-06-24 | End: 2020-06-24 | Stop reason: HOSPADM

## 2020-06-24 RX ORDER — HYDRALAZINE HYDROCHLORIDE 25 MG/1
25 TABLET, FILM COATED ORAL ONCE
Status: COMPLETED | OUTPATIENT
Start: 2020-06-24 | End: 2020-06-24

## 2020-06-24 RX ORDER — HYDRALAZINE HYDROCHLORIDE 25 MG/1
50 TABLET, FILM COATED ORAL ONCE
Status: COMPLETED | OUTPATIENT
Start: 2020-06-24 | End: 2020-06-24

## 2020-06-24 RX ORDER — HYDRALAZINE HYDROCHLORIDE 20 MG/ML
10 INJECTION INTRAMUSCULAR; INTRAVENOUS EVERY 30 MIN PRN
Status: DISCONTINUED | OUTPATIENT
Start: 2020-06-24 | End: 2020-06-24 | Stop reason: SDUPTHER

## 2020-06-24 RX ORDER — HYDRALAZINE HYDROCHLORIDE 25 MG/1
50 TABLET, FILM COATED ORAL 2 TIMES DAILY
Status: DISCONTINUED | OUTPATIENT
Start: 2020-06-24 | End: 2020-06-24 | Stop reason: HOSPADM

## 2020-06-24 RX ORDER — LABETALOL HYDROCHLORIDE 5 MG/ML
10 INJECTION, SOLUTION INTRAVENOUS EVERY 30 MIN PRN
Status: DISCONTINUED | OUTPATIENT
Start: 2020-06-24 | End: 2020-06-24 | Stop reason: HOSPADM

## 2020-06-24 RX ORDER — LISINOPRIL 20 MG/1
20 TABLET ORAL DAILY
Qty: 30 TABLET | Refills: 3 | Status: ON HOLD | OUTPATIENT
Start: 2020-06-25 | End: 2021-01-01 | Stop reason: HOSPADM

## 2020-06-24 RX ADMIN — HEPARIN SODIUM 5000 UNITS: 10000 INJECTION, SOLUTION INTRAVENOUS; SUBCUTANEOUS at 06:00

## 2020-06-24 RX ADMIN — CLEVIPIDINE 12 MG/HR: 0.5 EMULSION INTRAVENOUS at 02:51

## 2020-06-24 RX ADMIN — EPOETIN ALFA-EPBX 2000 UNITS: 2000 INJECTION, SOLUTION INTRAVENOUS; SUBCUTANEOUS at 12:43

## 2020-06-24 RX ADMIN — HYDRALAZINE HYDROCHLORIDE 10 MG: 20 INJECTION INTRAMUSCULAR; INTRAVENOUS at 14:55

## 2020-06-24 RX ADMIN — LABETALOL HYDROCHLORIDE 10 MG: 5 INJECTION INTRAVENOUS at 08:25

## 2020-06-24 RX ADMIN — HEPARIN SODIUM 5000 UNITS: 10000 INJECTION, SOLUTION INTRAVENOUS; SUBCUTANEOUS at 14:30

## 2020-06-24 RX ADMIN — Medication 10 ML: at 09:42

## 2020-06-24 RX ADMIN — OXYCODONE HYDROCHLORIDE 10 MG: 5 TABLET ORAL at 06:45

## 2020-06-24 RX ADMIN — LABETALOL HYDROCHLORIDE 10 MG: 5 INJECTION INTRAVENOUS at 09:45

## 2020-06-24 RX ADMIN — LISINOPRIL 15 MG: 10 TABLET ORAL at 13:40

## 2020-06-24 RX ADMIN — CLEVIPIDINE 2 MG/HR: 0.5 EMULSION INTRAVENOUS at 10:25

## 2020-06-24 RX ADMIN — CLEVIPIDINE 10 MG/HR: 0.5 EMULSION INTRAVENOUS at 00:15

## 2020-06-24 RX ADMIN — HYDRALAZINE HYDROCHLORIDE 25 MG: 25 TABLET, FILM COATED ORAL at 11:47

## 2020-06-24 RX ADMIN — METOPROLOL SUCCINATE 25 MG: 25 TABLET, EXTENDED RELEASE ORAL at 06:15

## 2020-06-24 RX ADMIN — HYDRALAZINE HYDROCHLORIDE 50 MG: 25 TABLET, FILM COATED ORAL at 16:58

## 2020-06-24 RX ADMIN — SODIUM PHOSPHATE, MONOBASIC, MONOHYDRATE 10 MMOL: 276; 142 INJECTION, SOLUTION INTRAVENOUS at 13:24

## 2020-06-24 RX ADMIN — HYDRALAZINE HYDROCHLORIDE 10 MG: 20 INJECTION INTRAMUSCULAR; INTRAVENOUS at 10:15

## 2020-06-24 RX ADMIN — LISINOPRIL 5 MG: 5 TABLET ORAL at 06:15

## 2020-06-24 RX ADMIN — HYDROMORPHONE HYDROCHLORIDE 0.5 MG: 1 INJECTION, SOLUTION INTRAMUSCULAR; INTRAVENOUS; SUBCUTANEOUS at 10:19

## 2020-06-24 RX ADMIN — CINACALCET HYDROCHLORIDE 30 MG: 30 TABLET, FILM COATED ORAL at 09:42

## 2020-06-24 RX ADMIN — CLEVIPIDINE 12 MG/HR: 0.5 EMULSION INTRAVENOUS at 04:45

## 2020-06-24 RX ADMIN — HYDRALAZINE HYDROCHLORIDE 25 MG: 25 TABLET, FILM COATED ORAL at 06:15

## 2020-06-24 RX ADMIN — OXYCODONE HYDROCHLORIDE 10 MG: 5 TABLET ORAL at 02:45

## 2020-06-24 RX ADMIN — ATROPA BELLADONNA AND OPIUM 60 MG: 16.2; 6 SUPPOSITORY RECTAL at 15:23

## 2020-06-24 ASSESSMENT — PAIN DESCRIPTION - ONSET: ONSET: GRADUAL

## 2020-06-24 ASSESSMENT — PAIN DESCRIPTION - PAIN TYPE: TYPE: ACUTE PAIN

## 2020-06-24 ASSESSMENT — PAIN SCALES - GENERAL
PAINLEVEL_OUTOF10: 5
PAINLEVEL_OUTOF10: 4
PAINLEVEL_OUTOF10: 0
PAINLEVEL_OUTOF10: 7
PAINLEVEL_OUTOF10: 3
PAINLEVEL_OUTOF10: 5
PAINLEVEL_OUTOF10: 8
PAINLEVEL_OUTOF10: 0
PAINLEVEL_OUTOF10: 2
PAINLEVEL_OUTOF10: 2

## 2020-06-24 ASSESSMENT — PAIN DESCRIPTION - PROGRESSION
CLINICAL_PROGRESSION: GRADUALLY IMPROVING
CLINICAL_PROGRESSION: RESOLVED
CLINICAL_PROGRESSION: GRADUALLY WORSENING
CLINICAL_PROGRESSION: GRADUALLY IMPROVING
CLINICAL_PROGRESSION: GRADUALLY WORSENING
CLINICAL_PROGRESSION: GRADUALLY IMPROVING
CLINICAL_PROGRESSION: NOT CHANGED

## 2020-06-24 ASSESSMENT — PAIN DESCRIPTION - DESCRIPTORS
DESCRIPTORS: ACHING;SORE
DESCRIPTORS: ACHING

## 2020-06-24 ASSESSMENT — PAIN DESCRIPTION - ORIENTATION
ORIENTATION: LOWER
ORIENTATION: LOWER

## 2020-06-24 ASSESSMENT — PAIN DESCRIPTION - LOCATION
LOCATION: BACK
LOCATION: BACK

## 2020-06-24 NOTE — PROGRESS NOTES
1720-campo catheter connected to drainage bag and mutamycin drained from bladder via gravity. 48 cc's returns. Bladder then irrigated with NS until fluid returns clear. Irrigation now clamped. Campo catheter remains intact to gravity.
Ashly's NP here bedside seeing patient
CVICU Admission Note    Name: Power Solis  MRN: [id number]    CC: Postoperative Critical Care Management     Indication for Surgery/Procedure: AAA without rupture, bladder tumor     Important/Relevant PMH/PSH: HTN, HLD, DMII, ESRD on HD,     Procedure/Surgeries:  6/22/2020 EVAR, bilateral femoral artery exposure       Physical Exam:    BP (!) 144/84   Pulse 72   Temp 98.9 °F (37.2 °C) (Temporal)   Resp 18   Ht 5' 6\" (1.676 m)   Wt 160 lb (72.6 kg)   SpO2 98%   BMI 25.82 kg/m²     General Appearance: Seen in PACU, hemodynamically stable on low dose Cleviprex gtt   Eyes: PERRL  Pulmonary: Diminished bibasilar. No wheezes, no accessory muscle use noted    Cardiovascular: RRR, no heaves or thrills palpated   Telemetry: SR  Abdomen: Soft, nontender, nondistended   Extremities: BLE with palpable DP/PT pulses   Neurologic/Psych: sleepy, easily arousable, following commands  Skin: Warm and dry   Incision: Bilateral groin incisions well approximated, no hematomas       Assessment/Plan: Day of Surgery     1. AAA/bladder tumor S/p EVAR, bilateral femoral artery exposure   - Frequent neurovascular checks, monitor incision for signs of swelling/hematoma   - NPO  - Murray catheter to GD  - Bedrest  - Ancef    2. Gross hematuria and positive cytology-- s/p cystoscopy   -urology following:Plan for repeat cystoscopy and tumor resection tomorrow     3. HTN   -On cleviprex infusion for target SBP<170    4.  ESRD  -HD via AV fistula--last session 6/20/2020  -nephrology consult     Electronically signed by ENMANUEL Sin - CNP on 6/22/2020 at 1:41 PM
Discharge instructions reviewed thoroughly with patient, questions answered as appropriate.  telephone numbers reviewed with pt and pt instructed to call for follow up. Instructions for EVAR after care reviewed, pt verbalized understanding. Murray removed. Pt states voids infrequently. Pt has dialysis tomoro early morning. Pt instr to call urology if unable to void or with any problems or issues. Pt verbalized understanding of all instr. Escorted to car via .
Dr Brain Amin here and asked about post op antibiotic wants second dose I 12 hours from first , see new order
Dr Ned Gama informed of creat 10.5
Dr. Jorge Lawson notified of bp 190/112 and pt did not take BP meds including Toprol XL. Instructed to have pt take BP meds with sip of water.
OCCUPATIONAL THERAPY    Date:2020  Patient Name: Francesco Carrillo  MRN: [id number]  : 1965  Room: [address] [place] [address]     Chart reviewed; pt currently undergoing dialysis this am. Pt scheduled for procedure today. Will monitor status and re-attempt OT evaluation when indicated.    Jeni Dueñas OTR/L [id number]
Patient arrived to PACU post op attached to monitor and VSS
Phosphorous level will be drawn 45 minutes after dialysis ends. Dialysis is not expected to end until after 1200.
Physical Therapy  Name: Sal Weller  Room: 2687/0973-P  Date: 06/23/20    PT consult received and appreciated. Evaluation on hold at this time d/t dialysis in the AM and planned for procedure. Will follow and complete as appropriate.      Ellis Zapata, PT, DPT  [id number]
Vascular Surgery Progress Note    Pt is being seen in f/u today regarding EVAR    Subjective  Pt s/e. Did well overnight, some pain but well controlled  , NPO for repeat cysto today    Current Medications:    dextrose      clevidipine 2 mg/hr (06/23/20 0017)      glucose, dextrose, glucagon (rDNA), dextrose, sodium chloride flush, oxyCODONE **OR** oxyCODONE, ondansetron, HYDROmorphone **OR** HYDROmorphone    metoprolol succinate  25 mg Oral Daily    lisinopril  5 mg Oral Daily    hydrALAZINE  25 mg Oral BID    cinacalcet  30 mg Oral Daily    insulin lispro  0-12 Units Subcutaneous 4x Daily AC & HS    sodium chloride flush  10 mL Intravenous 2 times per day    heparin (porcine)  5,000 Units Subcutaneous 3 times per day        PHYSICAL EXAM:    BP (!) 152/75   Pulse 73   Temp 97.9 °F (36.6 °C) (Temporal)   Resp 22   Ht 5' 6\" (1.676 m)   Wt 160 lb (72.6 kg)   SpO2 97%   BMI 25.82 kg/m²     Intake/Output Summary (Last 24 hours) at 6/23/2020 2045  Last data filed at 6/22/2020 2200  Gross per 24 hour   Intake 2855.77 ml   Output 100 ml   Net 2755.77 ml          Gen: awake, alert and oriented x3, no apparent distress  CVS: RRR  Resp: No increased work of breathing  Abd: Soft, non-tender, non-distended  R LE: groin incision without hematoma, palpable DP/PT  L LE: groin incision C/D/I without hematoma, palpable DP/PT    LABS:    Lab Results   Component Value Date    WBC 4.5 06/22/2020    HGB 9.2 (L) 06/22/2020    HCT 29.4 (L) 06/22/2020     06/22/2020    PROTIME 15.0 (H) 06/22/2020    INR 1.3 06/22/2020    APTT 31.4 06/22/2020    K 3.0 (L) 06/22/2020    BUN 28 (H) 06/22/2020    CREATININE 10.5 (HH) 06/22/2020       A/P  48 y.o. male POD 1 EVAR for AAA    NPO per urology for repeat cysto today  Restart home BP meds to wean cleviprex  Ok to ambulate  Monitor groin  Once cysto is done ok will [id number] [facility]. Pt recovering nicely. For dialysis this am then, cysto today.   Potential
Verbally spoke with Dr. Genesis James regarding patient daily medications and heparin due prior to procedure. Dr. Genesis James okay'd to give everything including heparin. Critical Value of patient's Cr of 12.1 also reported during conversation. Dr. Genesis James said patient will be getting dialysis today.
06/23/2020    HCT 26.5 06/23/2020         Assessment/Plan:  POD#1 Cystourethroscopy, clot evacuation, bilateral retrograde pyelography, transurethral resection of multiple bladder tumors (4 cm² in aggregate), fulguration, Campo placement, instillation of intravesical Mutamycin C, rectal exam    Hemoglobin stable  Pathology pending  Ok to remove campo catheter  He is ok for discharge from  standpoint  He will follow up in our office in one week to go over pathology results  No further  interventions planned at this time  Please call with further questions or concerns     James Crockett, ENMANUEL - CNP   FRANKLYN  Urology
demonstrating G tolerance      UB dressing/bathing S; set up                        IND       LB dressing/bathing SBA   Bed level                        Mod I   using AE as needed for safe reach/ energy conservation       Toileting NT                             Bed Mobility  Supine to sit: SBA    Sit to supine: SBA                        Mod I  in prep of ADL tasks & transfers   Functional Transfers Sit to stand: SBA    Stand to sit: SBA                        Mod I  sit<>stand/functional bathroom transfers using AD/DME as needed for balance and safety   Functional Mobility SBA  Initial unsteadiness; no device                       Mod I   functional/bathroom mobility using AD as needed & demonstrating G safety     Balance Sitting:     Static:  S    Dynamic:SBA  Standing: SBA  Mod I dynamic sitting balance; Mod I dynamic standing balance  during ADL tasks & transfers   Endurance/Activity Tolerance   F tolerance with moderate activity. G   tolerance with moderate activity/self care routine   Visual/  Perceptual   WFL                       Vitals:   HR at rest: 86 bpm HR at end of session: 87 bpm   Spo2 at rest:90% Spo2 at end of session 97%   BP at rest:209/93 mmHg BP at end of session 204/92 mmHg       Treatment: OT services provided include: Instruction on precautions prior to bed mobility to facilitate safe transfers and ADLS; sitting/standing balance retraining ex's to improve righting reactions with postural changes during ADLS; adapted techniques to increase independence and safe reach during dressing/bathing activities; functional transfer training including postural cues, sequencing &  safety  to assist with balance and fall prevention; breathing techniques, pacing, work simplification strategies & recommended bathroom DME reviewed for safety and energy conservation during self care tasks and activities of daily living. Comments: OK from RN to see patient.  Upon arrival, patient supine in bed;
tolerated  Stopped Cleviprex and continue home BP meds and prns  PT/OT  Ambulate  Will DC later this AM      Agatha Bolder     Agree. Appreciate assistance from Dr Kendrick Griggs for hypertension. Gradual reduction in pressure to avoid \"relative hypotension\" in this patient with chronic uncontrolled hypertension.

## 2020-06-24 NOTE — PLAN OF CARE
Problem: Falls - Risk of:  Goal: Will remain free from falls  Description: Will remain free from falls  6/23/2020 0145 by Kasey Stephens RN  Outcome: Ongoing  6/22/2020 1748 by Sergo Sterling RN  Outcome: Met This Shift  Goal: Absence of physical injury  Description: Absence of physical injury  6/23/2020 0145 by Kasey Stephens RN  Outcome: Ongoing  6/22/2020 1748 by Sergo Sterling RN  Outcome: Met This Shift     Problem: Cardiac:  Goal: Ability to maintain vital signs within normal range will improve  Description: Ability to maintain vital signs within normal range will improve  6/23/2020 0145 by Kasey Stephens RN  Outcome: Ongoing  6/22/2020 1748 by Sergo Sterling RN  Outcome: Not Met This Shift  Goal: Cardiovascular alteration will improve  Description: Cardiovascular alteration will improve  6/23/2020 0145 by Kasey Stephens RN  Outcome: Ongoing  6/22/2020 1748 by Sergo Sterling RN  Outcome: Not Met This Shift     Problem: Health Behavior:  Goal: Will modify at least one risk factor affecting health status  Description: Will modify at least one risk factor affecting health status  6/23/2020 0145 by Kasey Stephens RN  Outcome: Ongoing  6/22/2020 1748 by Sergo Sterling RN  Outcome: Not Met This Shift  Goal: Identification of resources available to assist in meeting health care needs will improve  Description: Identification of resources available to assist in meeting health care needs will improve  6/23/2020 0145 by Kasey Stephens RN  Outcome: Ongoing  6/22/2020 1748 by Sergo Sterling RN  Outcome: Not Met This Shift
Problem: Falls - Risk of:  Goal: Will remain free from falls  Description: Will remain free from falls  6/23/2020 2347 by Ron Bacon RN  Outcome: Met This Shift     Problem: Falls - Risk of:  Goal: Absence of physical injury  Description: Absence of physical injury  6/23/2020 2347 by Ron Bacon RN  Outcome: Met This Shift     Problem: Physical Regulation:  Goal: Complications related to the disease process, condition or treatment will be avoided or minimized  Description: Complications related to the disease process, condition or treatment will be avoided or minimized  6/23/2020 2347 by Ron Bacon RN  Outcome: Met This Shift     Problem: Pain:  Goal: Pain level will decrease  Description: Pain level will decrease  6/23/2020 2347 by Ron Bacon RN  Outcome: Met This Shift     Problem: Pain:  Goal: Control of acute pain  Description: Control of acute pain  6/23/2020 2347 by Ron Bacon RN  Outcome: Met This Shift     Problem: Pain:  Goal: Control of chronic pain  Description: Control of chronic pain  6/23/2020 2347 by Ron Bacon RN  Outcome: Met This Shift     Problem: Infection - Surgical Site:  Goal: Will show no infection signs and symptoms  Description: Will show no infection signs and symptoms  Outcome: Met This Shift     Problem: Infection - Surgical Site:  Goal: Ability to maintain a body temperature in the normal range will improve  Description: Ability to maintain a body temperature in the normal range will improve  Outcome: Met This Shift     Problem: Pain - Acute:  Goal: Recognizes and communicates pain  Description: Recognizes and communicates pain  Outcome: Met This Shift     Problem: Tissue Perfusion - Peripheral, Altered:  Goal: Absence of hematoma at arterial access site  Description: Absence of hematoma at arterial access site  Outcome: Met This Shift     Problem: Tissue Perfusion - Peripheral, Altered:  Goal: Absence of signs or symptoms of impaired
Problem: Falls - Risk of:  Goal: Will remain free from falls  Description: Will remain free from falls  Outcome: Met This Shift  Goal: Absence of physical injury  Description: Absence of physical injury  Outcome: Met This Shift     Problem: Cardiac:  Goal: Ability to maintain vital signs within normal range will improve  Description: Ability to maintain vital signs within normal range will improve  Outcome: Not Met This Shift  Goal: Cardiovascular alteration will improve  Description: Cardiovascular alteration will improve  Outcome: Not Met This Shift     Problem: Health Behavior:  Goal: Will modify at least one risk factor affecting health status  Description: Will modify at least one risk factor affecting health status  Outcome: Not Met This Shift  Goal: Identification of resources available to assist in meeting health care needs will improve  Description: Identification of resources available to assist in meeting health care needs will improve  Outcome: Not Met This Shift     Problem: Physical Regulation:  Goal: Complications related to the disease process, condition or treatment will be avoided or minimized  Description: Complications related to the disease process, condition or treatment will be avoided or minimized  Outcome: Not Met This Shift     Problem: Discharge Planning:  Goal: Discharged to appropriate level of care  Description: Discharged to appropriate level of care  Outcome: Not Met This Shift
Control of chronic pain  Description: Control of chronic pain  6/24/2020 1739 by Dick Harrison RN  Outcome: Completed  6/24/2020 0925 by Dick Harrison RN  Outcome: Not Met This Shift     Problem: Tissue Perfusion - Peripheral, Altered:  Goal: Absence of hematoma at arterial access site  Description: Absence of hematoma at arterial access site  6/24/2020 1739 by Dick Harrison RN  Outcome: Completed  6/24/2020 0925 by Dick Harrison RN  Outcome: Met This Shift  Goal: Absence of signs or symptoms of impaired coagulation  Description: Absence of signs or symptoms of impaired coagulation  6/24/2020 1739 by Dick Harrison RN  Outcome: Completed  6/24/2020 0925 by Dick Harrison RN  Outcome: Met This Shift  Goal: Circulatory function of lower extremities is within specified parameters  Description: Circulatory function of lower extremities is within specified parameters  6/24/2020 1739 by Dick Harrison RN  Outcome: Completed  6/24/2020 0925 by Dick Harrison RN  Outcome: Met This Shift  Goal: Strength of peripheral pulses will increase  Description: Strength of peripheral pulses will increase  6/24/2020 1739 by Dick Harrison RN  Outcome: Completed  6/24/2020 0925 by Dick Harrison RN  Outcome: Ongoing     Problem: Musculor/Skeletal Functional Status  Goal: Highest potential functional level  Outcome: Completed  Goal: Absence of falls  Outcome: Completed

## 2020-06-24 NOTE — ANESTHESIA POSTPROCEDURE EVALUATION
Department of Anesthesiology  Postprocedure Note    Patient: James Young  MRN: [id number]  YOB: 1965  Date of evaluation: 6/23/2020  Time:  10:22 PM     Procedure Summary     Date:  06/23/20 Room / Location:  [facility] OR 11 / [facility]    Anesthesia Start:  7036 Anesthesia Stop:  0167    Procedure:  CYSTOSCOPY, RETROGRADE PYELOGRAM, TRANSURETHRAL RESECTION BLADDER TUMOR, INSTILLATION OF MITOMYCIN-C (N/A Pelvis) Diagnosis:  (.)    Surgeon:  Rell Arroyo MD Responsible Provider:  Dex Grigsby MD    Anesthesia Type:  MAC ASA Status:  3          Anesthesia Type: MAC    Radhames Phase I: Radhames Score: 8    Radhames Phase II:      Last vitals: Reviewed and per EMR flowsheets.        Anesthesia Post Evaluation    Patient location during evaluation: ICU  Patient participation: complete - patient participated  Level of consciousness: awake  Airway patency: patent  Nausea & Vomiting: no nausea and no vomiting  Complications: no  Cardiovascular status: hemodynamically stable  Respiratory status: acceptable  Hydration status: stable

## 2020-06-24 NOTE — CARE COORDINATION
Met with pt in room, sitting in chair. He states that he is independent in adl's. He goes to  at [facility]. He uses [employer] for transportation. Pt states he goes to the [address] [place] [address] on [facility] for medical care and [facility] Pt report that his sister in law lives close by ( on [facility]) and will provide transportation home when discharged. No discharge needs identified at this time.

## 2020-06-25 ENCOUNTER — TELEPHONE (OUTPATIENT)
Dept: VASCULAR SURGERY | Age: 55
End: 2020-06-25
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
## 2020-07-07 ENCOUNTER — OFFICE VISIT (OUTPATIENT)
Dept: VASCULAR SURGERY | Age: 55
End: 2020-07-07

## 2020-07-07 PROCEDURE — 99024 POSTOP FOLLOW-UP VISIT: CPT | Performed by: PHYSICIAN ASSISTANT

## 2020-07-07 NOTE — PROGRESS NOTES
7/7/2020    Marga Serene  1965    Chief Complaint   Patient presents with    Post-Op Check     s/p AAA repair-     Patient returns for post operative evaluation status post endovascular repair of abdominal aortic aneurysm. The patient denies any unexpected problems since hospital discharge. Procedure Laterality Date    ABDOMINAL AORTIC ANEURYSM REPAIR, ENDOVASCULAR N/A 6/22/2020    ABDOMINAL AORTIC ANEURYSM REPAIR ENDOVASCULAR performed by Celine Whiting MD at [address]      abdominal cyst    CYSTOSCOPY N/A 6/22/2020    CYSTOSCOPY of BLADDER TUMOR performed by Jeane Ho MD at [facility] 6/23/2020    CYSTOSCOPY, RETROGRADE PYELOGRAM, TRANSURETHRAL RESECTION BLADDER TUMOR, INSTILLATION OF MITOMYCIN-C performed by Emily Sage MD at [facility] 02/19/2016    Ashly LOVE       Physical Exam:  The incisions are healing without evidence of infection. Heart rhythm is regular. Small seroma to R groin incision. Moderate sized seroma with no pulsatility to the L groin. -TTP b/l          Right      Left   Brachial     Radial     Femoral     Popliteal     Dorsalis Pedis     Posterior Tibial 2 2   (3=normal, 2=diminished, 1=barely palpable, 4=widened)    Assessment:  Post-operative endovascular repair abdominal aortic aneurysm. Problem List Items Addressed This Visit        Vascular Problems    AAA (abdominal aortic aneurysm) without rupture ([address] - Primary    Relevant Orders    US DUPLEX SCAN OF AORTA        I reviewed with the patient that normal activities can be resumed as tolerated. Plan: Ultrasound in 1 month due to ESRD and not wanting to expose him to contrast for CTA. Pt seen and plan reviewed with Dr. Bernardino Laughlin.      Nydia Alejandro PA-C

## 2020-07-09 ENCOUNTER — TELEPHONE (OUTPATIENT)
Dept: VASCULAR SURGERY | Age: 55
End: 2020-07-09
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
## 2020-07-12 ENCOUNTER — HOSPITAL ENCOUNTER (INPATIENT)
Age: 55
LOS: 1 days | Discharge: HOME | DRG: 424 | End: 2020-07-15
Attending: EMERGENCY MEDICINE | Admitting: INTERNAL MEDICINE
Payer: COMMERCIAL

## 2020-07-12 ENCOUNTER — APPOINTMENT (OUTPATIENT)
Dept: GENERAL RADIOLOGY | Age: 55
DRG: 424 | End: 2020-07-12
Payer: COMMERCIAL

## 2020-07-12 ENCOUNTER — APPOINTMENT (OUTPATIENT)
Dept: CT IMAGING | Age: 55
DRG: 424 | End: 2020-07-12
Payer: COMMERCIAL

## 2020-07-12 LAB
ALBUMIN SERPL-MCNC: 3.7 G/DL (ref 3.5–5.2)
ALP BLD-CCNC: 58 U/L (ref 40–129)
ALT SERPL-CCNC: 11 U/L (ref 0–40)
ANION GAP SERPL CALCULATED.3IONS-SCNC: 15 MMOL/L (ref 7–16)
AST SERPL-CCNC: 35 U/L (ref 0–39)
BASOPHILS ABSOLUTE: 0.06 E9/L (ref 0–0.2)
BASOPHILS RELATIVE PERCENT: 0.9 % (ref 0–2)
BILIRUB SERPL-MCNC: 0.4 MG/DL (ref 0–1.2)
BUN BLDV-MCNC: 22 MG/DL (ref 6–20)
CALCIUM SERPL-MCNC: 9.3 MG/DL (ref 8.6–10.2)
CHLORIDE BLD-SCNC: 97 MMOL/L (ref 98–107)
CHP ED QC CHECK: NORMAL
CHP ED QC CHECK: NORMAL
CO2: 27 MMOL/L (ref 22–29)
CREAT SERPL-MCNC: 7.8 MG/DL (ref 0.7–1.2)
D DIMER: 2119 NG/ML DDU
EOSINOPHILS ABSOLUTE: 0.15 E9/L (ref 0.05–0.5)
EOSINOPHILS RELATIVE PERCENT: 2.2 % (ref 0–6)
GFR AFRICAN AMERICAN: 9
GFR NON-AFRICAN AMERICAN: 9 ML/MIN/1.73
GLUCOSE BLD-MCNC: 126 MG/DL (ref 74–99)
GLUCOSE BLD-MCNC: 46 MG/DL
GLUCOSE BLD-MCNC: 93 MG/DL
HCT VFR BLD CALC: 25.4 % (ref 37–54)
HEMOGLOBIN: 7.9 G/DL (ref 12.5–16.5)
IMMATURE GRANULOCYTES #: 0.02 E9/L
IMMATURE GRANULOCYTES %: 0.3 % (ref 0–5)
LACTIC ACID: 2 MMOL/L (ref 0.5–2.2)
LYMPHOCYTES ABSOLUTE: 1.31 E9/L (ref 1.5–4)
LYMPHOCYTES RELATIVE PERCENT: 19.2 % (ref 20–42)
MAGNESIUM: 2.2 MG/DL (ref 1.6–2.6)
MCH RBC QN AUTO: 28.7 PG (ref 26–35)
MCHC RBC AUTO-ENTMCNC: 31.1 % (ref 32–34.5)
MCV RBC AUTO: 92.4 FL (ref 80–99.9)
METER GLUCOSE: 41 MG/DL (ref 74–99)
METER GLUCOSE: 41 MG/DL (ref 74–99)
METER GLUCOSE: 46 MG/DL (ref 74–99)
METER GLUCOSE: 52 MG/DL (ref 74–99)
METER GLUCOSE: 93 MG/DL (ref 74–99)
METER GLUCOSE: <40 MG/DL (ref 74–99)
MONOCYTES ABSOLUTE: 0.51 E9/L (ref 0.1–0.95)
MONOCYTES RELATIVE PERCENT: 7.5 % (ref 2–12)
NEUTROPHILS ABSOLUTE: 4.77 E9/L (ref 1.8–7.3)
NEUTROPHILS RELATIVE PERCENT: 69.9 % (ref 43–80)
PDW BLD-RTO: 17.6 FL (ref 11.5–15)
PLATELET # BLD: 210 E9/L (ref 130–450)
PMV BLD AUTO: 11.4 FL (ref 7–12)
POTASSIUM REFLEX MAGNESIUM: 3.5 MMOL/L (ref 3.5–5)
POTASSIUM SERPL-SCNC: 2.9 MMOL/L (ref 3.5–5)
PRO-BNP: ABNORMAL PG/ML (ref 0–125)
RBC # BLD: 2.75 E12/L (ref 3.8–5.8)
SODIUM BLD-SCNC: 139 MMOL/L (ref 132–146)
TOTAL PROTEIN: 6.9 G/DL (ref 6.4–8.3)
TROPONIN: 0.13 NG/ML (ref 0–0.03)
TROPONIN: 0.15 NG/ML (ref 0–0.03)
WBC # BLD: 6.8 E9/L (ref 4.5–11.5)

## 2020-07-12 PROCEDURE — 80053 COMPREHEN METABOLIC PANEL: CPT

## 2020-07-12 PROCEDURE — 2580000003 HC RX 258

## 2020-07-12 PROCEDURE — 6370000000 HC RX 637 (ALT 250 FOR IP): Performed by: EMERGENCY MEDICINE

## 2020-07-12 PROCEDURE — 84484 ASSAY OF TROPONIN QUANT: CPT

## 2020-07-12 PROCEDURE — 96366 THER/PROPH/DIAG IV INF ADDON: CPT

## 2020-07-12 PROCEDURE — 82962 GLUCOSE BLOOD TEST: CPT

## 2020-07-12 PROCEDURE — 83036 HEMOGLOBIN GLYCOSYLATED A1C: CPT

## 2020-07-12 PROCEDURE — 6360000004 HC RX CONTRAST MEDICATION: Performed by: RADIOLOGY

## 2020-07-12 PROCEDURE — 96374 THER/PROPH/DIAG INJ IV PUSH: CPT

## 2020-07-12 PROCEDURE — 71045 X-RAY EXAM CHEST 1 VIEW: CPT

## 2020-07-12 PROCEDURE — 85025 COMPLETE CBC W/AUTO DIFF WBC: CPT

## 2020-07-12 PROCEDURE — 96367 TX/PROPH/DG ADDL SEQ IV INF: CPT

## 2020-07-12 PROCEDURE — 82533 TOTAL CORTISOL: CPT

## 2020-07-12 PROCEDURE — 96375 TX/PRO/DX INJ NEW DRUG ADDON: CPT

## 2020-07-12 PROCEDURE — 83735 ASSAY OF MAGNESIUM: CPT

## 2020-07-12 PROCEDURE — 93005 ELECTROCARDIOGRAM TRACING: CPT | Performed by: STUDENT IN AN ORGANIZED HEALTH CARE EDUCATION/TRAINING PROGRAM

## 2020-07-12 PROCEDURE — 96365 THER/PROPH/DIAG IV INF INIT: CPT

## 2020-07-12 PROCEDURE — 2580000003 HC RX 258: Performed by: RADIOLOGY

## 2020-07-12 PROCEDURE — 71275 CT ANGIOGRAPHY CHEST: CPT

## 2020-07-12 PROCEDURE — 83605 ASSAY OF LACTIC ACID: CPT

## 2020-07-12 PROCEDURE — 2580000003 HC RX 258: Performed by: STUDENT IN AN ORGANIZED HEALTH CARE EDUCATION/TRAINING PROGRAM

## 2020-07-12 PROCEDURE — 85378 FIBRIN DEGRADE SEMIQUANT: CPT

## 2020-07-12 PROCEDURE — 2580000003 HC RX 258: Performed by: EMERGENCY MEDICINE

## 2020-07-12 PROCEDURE — 99285 EMERGENCY DEPT VISIT HI MDM: CPT

## 2020-07-12 PROCEDURE — U0002 COVID-19 LAB TEST NON-CDC: HCPCS

## 2020-07-12 PROCEDURE — 84132 ASSAY OF SERUM POTASSIUM: CPT

## 2020-07-12 PROCEDURE — 96376 TX/PRO/DX INJ SAME DRUG ADON: CPT

## 2020-07-12 PROCEDURE — 6360000002 HC RX W HCPCS: Performed by: EMERGENCY MEDICINE

## 2020-07-12 PROCEDURE — 83880 ASSAY OF NATRIURETIC PEPTIDE: CPT

## 2020-07-12 RX ORDER — DEXTROSE MONOHYDRATE 25 G/50ML
50 INJECTION, SOLUTION INTRAVENOUS PRN
Status: DISCONTINUED | OUTPATIENT
Start: 2020-07-12 | End: 2020-07-13 | Stop reason: SDUPTHER

## 2020-07-12 RX ORDER — POTASSIUM CHLORIDE 20 MEQ/1
20 TABLET, EXTENDED RELEASE ORAL ONCE
Status: COMPLETED | OUTPATIENT
Start: 2020-07-12 | End: 2020-07-12

## 2020-07-12 RX ORDER — DEXTROSE MONOHYDRATE 100 MG/ML
INJECTION, SOLUTION INTRAVENOUS CONTINUOUS
Status: DISCONTINUED | OUTPATIENT
Start: 2020-07-12 | End: 2020-07-13 | Stop reason: DRUGHIGH

## 2020-07-12 RX ORDER — DEXTROSE MONOHYDRATE 25 G/50ML
25 INJECTION, SOLUTION INTRAVENOUS ONCE
Status: COMPLETED | OUTPATIENT
Start: 2020-07-12 | End: 2020-07-12

## 2020-07-12 RX ORDER — DEXTROSE MONOHYDRATE 25 G/50ML
INJECTION, SOLUTION INTRAVENOUS
Status: DISCONTINUED
Start: 2020-07-12 | End: 2020-07-12

## 2020-07-12 RX ORDER — SODIUM CHLORIDE 0.9 % (FLUSH) 0.9 %
10 SYRINGE (ML) INJECTION PRN
Status: DISCONTINUED | OUTPATIENT
Start: 2020-07-12 | End: 2020-07-15 | Stop reason: HOSPADM

## 2020-07-12 RX ORDER — DEXTROSE MONOHYDRATE 25 G/50ML
25 INJECTION, SOLUTION INTRAVENOUS PRN
Status: DISCONTINUED | OUTPATIENT
Start: 2020-07-12 | End: 2020-07-13 | Stop reason: SDUPTHER

## 2020-07-12 RX ADMIN — HYDROCORTISONE SODIUM SUCCINATE 100 MG: 100 INJECTION, POWDER, FOR SOLUTION INTRAMUSCULAR; INTRAVENOUS at 23:54

## 2020-07-12 RX ADMIN — DEXTROSE MONOHYDRATE 50 ML: 25 INJECTION, SOLUTION INTRAVENOUS at 21:18

## 2020-07-12 RX ADMIN — DEXTROSE MONOHYDRATE 25 G: 25 INJECTION, SOLUTION INTRAVENOUS at 20:11

## 2020-07-12 RX ADMIN — IOPAMIDOL 60 ML: 755 INJECTION, SOLUTION INTRAVENOUS at 22:55

## 2020-07-12 RX ADMIN — POTASSIUM CHLORIDE: 1500 TABLET, EXTENDED RELEASE ORAL at 23:28

## 2020-07-12 RX ADMIN — Medication 10 ML: at 22:55

## 2020-07-12 RX ADMIN — DEXTROSE MONOHYDRATE: 100 INJECTION, SOLUTION INTRAVENOUS at 22:12

## 2020-07-12 ASSESSMENT — ENCOUNTER SYMPTOMS
COUGH: 0
BLOOD IN STOOL: 0
BACK PAIN: 0
SORE THROAT: 0
SHORTNESS OF BREATH: 1
CONSTIPATION: 0
DIARRHEA: 0
NAUSEA: 0
VOMITING: 0
ABDOMINAL PAIN: 0
RHINORRHEA: 0

## 2020-07-13 ENCOUNTER — TELEPHONE (OUTPATIENT)
Dept: ENDOCRINOLOGY | Age: 55
End: 2020-07-13

## 2020-07-13 PROBLEM — E16.2 HYPOGLYCEMIA: Status: ACTIVE | Noted: 2020-07-13

## 2020-07-13 LAB
ALBUMIN SERPL-MCNC: 3.3 G/DL (ref 3.5–5.2)
ALP BLD-CCNC: 60 U/L (ref 40–129)
ALT SERPL-CCNC: 9 U/L (ref 0–40)
ANION GAP SERPL CALCULATED.3IONS-SCNC: 14 MMOL/L (ref 7–16)
AST SERPL-CCNC: 18 U/L (ref 0–39)
BILIRUB SERPL-MCNC: 0.4 MG/DL (ref 0–1.2)
BUN BLDV-MCNC: 24 MG/DL (ref 6–20)
CALCIUM SERPL-MCNC: 9.3 MG/DL (ref 8.6–10.2)
CHLORIDE BLD-SCNC: 94 MMOL/L (ref 98–107)
CO2: 27 MMOL/L (ref 22–29)
CORTISOL TOTAL: 32.07 MCG/DL (ref 2.68–18.4)
CORTISOL TOTAL: 60.5 MCG/DL (ref 2.68–18.4)
CREAT SERPL-MCNC: 8.6 MG/DL (ref 0.7–1.2)
EKG ATRIAL RATE: 99 BPM
EKG P AXIS: 69 DEGREES
EKG P-R INTERVAL: 156 MS
EKG Q-T INTERVAL: 380 MS
EKG QRS DURATION: 102 MS
EKG QTC CALCULATION (BAZETT): 487 MS
EKG R AXIS: -26 DEGREES
EKG T AXIS: 163 DEGREES
EKG VENTRICULAR RATE: 99 BPM
GFR AFRICAN AMERICAN: 8
GFR NON-AFRICAN AMERICAN: 8 ML/MIN/1.73
GLUCOSE BLD-MCNC: 33 MG/DL (ref 74–99)
HBA1C MFR BLD: 4.6 % (ref 4–5.6)
HCT VFR BLD CALC: 25.4 % (ref 37–54)
HEMOGLOBIN: 7.9 G/DL (ref 12.5–16.5)
MCH RBC QN AUTO: 28.7 PG (ref 26–35)
MCHC RBC AUTO-ENTMCNC: 31.1 % (ref 32–34.5)
MCV RBC AUTO: 92.4 FL (ref 80–99.9)
METER GLUCOSE: 115 MG/DL (ref 74–99)
METER GLUCOSE: 128 MG/DL (ref 74–99)
METER GLUCOSE: 40 MG/DL (ref 74–99)
METER GLUCOSE: 44 MG/DL (ref 74–99)
METER GLUCOSE: 44 MG/DL (ref 74–99)
METER GLUCOSE: 45 MG/DL (ref 74–99)
METER GLUCOSE: 46 MG/DL (ref 74–99)
METER GLUCOSE: 47 MG/DL (ref 74–99)
METER GLUCOSE: 50 MG/DL (ref 74–99)
METER GLUCOSE: 55 MG/DL (ref 74–99)
METER GLUCOSE: 60 MG/DL (ref 74–99)
METER GLUCOSE: 61 MG/DL (ref 74–99)
METER GLUCOSE: 63 MG/DL (ref 74–99)
METER GLUCOSE: 68 MG/DL (ref 74–99)
METER GLUCOSE: 69 MG/DL (ref 74–99)
METER GLUCOSE: 72 MG/DL (ref 74–99)
METER GLUCOSE: 72 MG/DL (ref 74–99)
METER GLUCOSE: 76 MG/DL (ref 74–99)
METER GLUCOSE: 84 MG/DL (ref 74–99)
METER GLUCOSE: 93 MG/DL (ref 74–99)
METER GLUCOSE: 99 MG/DL (ref 74–99)
METER GLUCOSE: <40 MG/DL (ref 74–99)
PDW BLD-RTO: 17.2 FL (ref 11.5–15)
PLATELET # BLD: 179 E9/L (ref 130–450)
PMV BLD AUTO: 10.8 FL (ref 7–12)
POTASSIUM REFLEX MAGNESIUM: 3.9 MMOL/L (ref 3.5–5)
RBC # BLD: 2.75 E12/L (ref 3.8–5.8)
SARS-COV-2, NAAT: ABNORMAL
SARS-COV-2, NAAT: NOT DETECTED
SODIUM BLD-SCNC: 135 MMOL/L (ref 132–146)
TOTAL PROTEIN: 6.5 G/DL (ref 6.4–8.3)
WBC # BLD: 5.1 E9/L (ref 4.5–11.5)

## 2020-07-13 PROCEDURE — 85027 COMPLETE CBC AUTOMATED: CPT

## 2020-07-13 PROCEDURE — 6360000002 HC RX W HCPCS: Performed by: INTERNAL MEDICINE

## 2020-07-13 PROCEDURE — 6370000000 HC RX 637 (ALT 250 FOR IP): Performed by: INTERNAL MEDICINE

## 2020-07-13 PROCEDURE — 2580000003 HC RX 258: Performed by: INTERNAL MEDICINE

## 2020-07-13 PROCEDURE — 82962 GLUCOSE BLOOD TEST: CPT

## 2020-07-13 PROCEDURE — G0378 HOSPITAL OBSERVATION PER HR: HCPCS

## 2020-07-13 PROCEDURE — 2500000003 HC RX 250 WO HCPCS: Performed by: FAMILY MEDICINE

## 2020-07-13 PROCEDURE — 82533 TOTAL CORTISOL: CPT

## 2020-07-13 PROCEDURE — 87040 BLOOD CULTURE FOR BACTERIA: CPT

## 2020-07-13 PROCEDURE — 93010 ELECTROCARDIOGRAM REPORT: CPT | Performed by: INTERNAL MEDICINE

## 2020-07-13 PROCEDURE — 2580000003 HC RX 258: Performed by: STUDENT IN AN ORGANIZED HEALTH CARE EDUCATION/TRAINING PROGRAM

## 2020-07-13 PROCEDURE — 96372 THER/PROPH/DIAG INJ SC/IM: CPT

## 2020-07-13 PROCEDURE — 36415 COLL VENOUS BLD VENIPUNCTURE: CPT

## 2020-07-13 PROCEDURE — 96376 TX/PRO/DX INJ SAME DRUG ADON: CPT

## 2020-07-13 PROCEDURE — 96366 THER/PROPH/DIAG IV INF ADDON: CPT

## 2020-07-13 PROCEDURE — U0002 COVID-19 LAB TEST NON-CDC: HCPCS

## 2020-07-13 PROCEDURE — 99243 OFF/OP CNSLTJ NEW/EST LOW 30: CPT | Performed by: INTERNAL MEDICINE

## 2020-07-13 PROCEDURE — 2500000003 HC RX 250 WO HCPCS: Performed by: STUDENT IN AN ORGANIZED HEALTH CARE EDUCATION/TRAINING PROGRAM

## 2020-07-13 PROCEDURE — 96375 TX/PRO/DX INJ NEW DRUG ADDON: CPT

## 2020-07-13 PROCEDURE — 6360000002 HC RX W HCPCS: Performed by: CLINICAL NURSE SPECIALIST

## 2020-07-13 PROCEDURE — 80053 COMPREHEN METABOLIC PANEL: CPT

## 2020-07-13 PROCEDURE — 96367 TX/PROPH/DG ADDL SEQ IV INF: CPT

## 2020-07-13 PROCEDURE — 2580000003 HC RX 258: Performed by: RADIOLOGY

## 2020-07-13 RX ORDER — METOPROLOL SUCCINATE 25 MG/1
25 TABLET, EXTENDED RELEASE ORAL DAILY
Status: DISCONTINUED | OUTPATIENT
Start: 2020-07-13 | End: 2020-07-15 | Stop reason: HOSPADM

## 2020-07-13 RX ORDER — DEXTROSE MONOHYDRATE 25 G/50ML
50 INJECTION, SOLUTION INTRAVENOUS ONCE
Status: COMPLETED | OUTPATIENT
Start: 2020-07-13 | End: 2020-07-13

## 2020-07-13 RX ORDER — ACETAMINOPHEN 325 MG/1
650 TABLET ORAL EVERY 4 HOURS PRN
Status: DISCONTINUED | OUTPATIENT
Start: 2020-07-13 | End: 2020-07-15 | Stop reason: HOSPADM

## 2020-07-13 RX ORDER — HYDRALAZINE HYDROCHLORIDE 25 MG/1
50 TABLET, FILM COATED ORAL 2 TIMES DAILY
Status: DISCONTINUED | OUTPATIENT
Start: 2020-07-13 | End: 2020-07-15 | Stop reason: HOSPADM

## 2020-07-13 RX ORDER — DEXTROSE MONOHYDRATE 100 MG/ML
INJECTION, SOLUTION INTRAVENOUS CONTINUOUS
Status: DISCONTINUED | OUTPATIENT
Start: 2020-07-13 | End: 2020-07-14

## 2020-07-13 RX ORDER — DEXTROSE MONOHYDRATE 50 MG/ML
100 INJECTION, SOLUTION INTRAVENOUS PRN
Status: DISCONTINUED | OUTPATIENT
Start: 2020-07-13 | End: 2020-07-15 | Stop reason: HOSPADM

## 2020-07-13 RX ORDER — GLIPIZIDE 5 MG/1
TABLET ORAL
Status: ON HOLD | COMMUNITY
Start: 2020-06-01 | End: 2020-07-15 | Stop reason: HOSPADM

## 2020-07-13 RX ORDER — DEXTROSE MONOHYDRATE 25 G/50ML
25 INJECTION, SOLUTION INTRAVENOUS PRN
Status: DISCONTINUED | OUTPATIENT
Start: 2020-07-13 | End: 2020-07-13 | Stop reason: SDUPTHER

## 2020-07-13 RX ORDER — OCTREOTIDE ACETATE 50 UG/ML
50 INJECTION, SOLUTION INTRAVENOUS; SUBCUTANEOUS EVERY 8 HOURS
Status: DISCONTINUED | OUTPATIENT
Start: 2020-07-13 | End: 2020-07-14

## 2020-07-13 RX ORDER — DEXTROSE MONOHYDRATE 25 G/50ML
50 INJECTION, SOLUTION INTRAVENOUS PRN
Status: DISCONTINUED | OUTPATIENT
Start: 2020-07-13 | End: 2020-07-13 | Stop reason: SDUPTHER

## 2020-07-13 RX ORDER — DEXTROSE AND SODIUM CHLORIDE 5; .45 G/100ML; G/100ML
INJECTION, SOLUTION INTRAVENOUS CONTINUOUS
Status: DISCONTINUED | OUTPATIENT
Start: 2020-07-13 | End: 2020-07-13

## 2020-07-13 RX ORDER — NICOTINE POLACRILEX 4 MG
15 LOZENGE BUCCAL PRN
Status: DISCONTINUED | OUTPATIENT
Start: 2020-07-13 | End: 2020-07-15 | Stop reason: HOSPADM

## 2020-07-13 RX ORDER — CALCIUM ACETATE 667 MG/1
1334 CAPSULE ORAL
COMMUNITY
Start: 2020-07-01 | End: 2021-01-01

## 2020-07-13 RX ORDER — CINACALCET 30 MG/1
30 TABLET, FILM COATED ORAL DAILY
Status: DISCONTINUED | OUTPATIENT
Start: 2020-07-13 | End: 2020-07-15 | Stop reason: HOSPADM

## 2020-07-13 RX ORDER — DEXTROSE MONOHYDRATE 25 G/50ML
12.5 INJECTION, SOLUTION INTRAVENOUS PRN
Status: DISCONTINUED | OUTPATIENT
Start: 2020-07-13 | End: 2020-07-15 | Stop reason: HOSPADM

## 2020-07-13 RX ORDER — HYDRALAZINE HYDROCHLORIDE 20 MG/ML
10 INJECTION INTRAMUSCULAR; INTRAVENOUS EVERY 4 HOURS PRN
Status: DISCONTINUED | OUTPATIENT
Start: 2020-07-13 | End: 2020-07-15 | Stop reason: HOSPADM

## 2020-07-13 RX ORDER — LIDOCAINE AND PRILOCAINE 25; 25 MG/G; MG/G
CREAM TOPICAL
COMMUNITY
Start: 2020-06-25 | End: 2021-01-01

## 2020-07-13 RX ORDER — LISINOPRIL 20 MG/1
20 TABLET ORAL DAILY
Status: DISCONTINUED | OUTPATIENT
Start: 2020-07-13 | End: 2020-07-13

## 2020-07-13 RX ORDER — LISINOPRIL 20 MG/1
40 TABLET ORAL DAILY
Status: DISCONTINUED | OUTPATIENT
Start: 2020-07-14 | End: 2020-07-15 | Stop reason: HOSPADM

## 2020-07-13 RX ORDER — HEPARIN SODIUM 10000 [USP'U]/ML
5000 INJECTION, SOLUTION INTRAVENOUS; SUBCUTANEOUS EVERY 8 HOURS
Status: DISCONTINUED | OUTPATIENT
Start: 2020-07-13 | End: 2020-07-15 | Stop reason: HOSPADM

## 2020-07-13 RX ADMIN — LISINOPRIL 20 MG: 20 TABLET ORAL at 09:26

## 2020-07-13 RX ADMIN — Medication 10 ML: at 21:47

## 2020-07-13 RX ADMIN — GLUCAGON HYDROCHLORIDE 1 MG: KIT at 23:37

## 2020-07-13 RX ADMIN — DEXTROSE MONOHYDRATE 12.5 G: 25 INJECTION, SOLUTION INTRAVENOUS at 21:47

## 2020-07-13 RX ADMIN — DEXTROSE MONOHYDRATE 50 ML: 25 INJECTION, SOLUTION INTRAVENOUS at 11:22

## 2020-07-13 RX ADMIN — HEPARIN SODIUM 5000 UNITS: 10000 INJECTION INTRAVENOUS; SUBCUTANEOUS at 13:30

## 2020-07-13 RX ADMIN — DEXTROSE AND SODIUM CHLORIDE: 5; 450 INJECTION, SOLUTION INTRAVENOUS at 05:19

## 2020-07-13 RX ADMIN — DEXTROSE MONOHYDRATE: 70 INJECTION, SOLUTION INTRAVENOUS at 02:51

## 2020-07-13 RX ADMIN — DEXTROSE MONOHYDRATE 50 G: 25 INJECTION, SOLUTION INTRAVENOUS at 22:53

## 2020-07-13 RX ADMIN — CINACALCET HYDROCHLORIDE 30 MG: 30 TABLET, FILM COATED ORAL at 09:26

## 2020-07-13 RX ADMIN — DEXTROSE MONOHYDRATE 50 ML: 25 INJECTION, SOLUTION INTRAVENOUS at 00:58

## 2020-07-13 RX ADMIN — DEXTROSE MONOHYDRATE 25 G: 25 INJECTION, SOLUTION INTRAVENOUS at 06:58

## 2020-07-13 RX ADMIN — HEPARIN SODIUM 5000 UNITS: 10000 INJECTION INTRAVENOUS; SUBCUTANEOUS at 05:41

## 2020-07-13 RX ADMIN — DEXTROSE MONOHYDRATE 50 ML: 25 INJECTION, SOLUTION INTRAVENOUS at 02:32

## 2020-07-13 RX ADMIN — Medication 10 ML: at 05:39

## 2020-07-13 RX ADMIN — HYDRALAZINE HYDROCHLORIDE 10 MG: 20 INJECTION INTRAMUSCULAR; INTRAVENOUS at 05:38

## 2020-07-13 RX ADMIN — HYDRALAZINE HYDROCHLORIDE 50 MG: 25 TABLET, FILM COATED ORAL at 20:45

## 2020-07-13 RX ADMIN — EPOETIN ALFA-EPBX 3000 UNITS: 3000 INJECTION, SOLUTION INTRAVENOUS; SUBCUTANEOUS at 14:00

## 2020-07-13 RX ADMIN — DEXTROSE MONOHYDRATE 12.5 G: 25 INJECTION, SOLUTION INTRAVENOUS at 22:37

## 2020-07-13 RX ADMIN — HEPARIN SODIUM 5000 UNITS: 10000 INJECTION INTRAVENOUS; SUBCUTANEOUS at 20:46

## 2020-07-13 RX ADMIN — Medication 30 G: at 20:45

## 2020-07-13 RX ADMIN — HYDRALAZINE HYDROCHLORIDE 50 MG: 25 TABLET, FILM COATED ORAL at 09:25

## 2020-07-13 RX ADMIN — DEXTROSE MONOHYDRATE: 100 INJECTION, SOLUTION INTRAVENOUS at 16:55

## 2020-07-13 RX ADMIN — DEXTROSE MONOHYDRATE 25 G: 25 INJECTION, SOLUTION INTRAVENOUS at 09:18

## 2020-07-13 RX ADMIN — METOPROLOL SUCCINATE 25 MG: 25 TABLET, EXTENDED RELEASE ORAL at 09:25

## 2020-07-13 ASSESSMENT — PAIN SCALES - GENERAL
PAINLEVEL_OUTOF10: 0

## 2020-07-13 ASSESSMENT — PAIN DESCRIPTION - LOCATION: LOCATION: BACK

## 2020-07-13 ASSESSMENT — PAIN DESCRIPTION - PAIN TYPE: TYPE: ACUTE PAIN

## 2020-07-13 ASSESSMENT — PAIN DESCRIPTION - ORIENTATION: ORIENTATION: LOWER

## 2020-07-13 NOTE — PROGRESS NOTES
Left message with endocrinologys answering service for  Dr Carrie Butler, infoming them patients blood sugar remains below 70.

## 2020-07-13 NOTE — PROGRESS NOTES
Hospitalist Progress Note      PCP: No primary care provider on file. Date of Admission: 7/12/2020    Chief Complaint: [facility] Course: Patient presented to the emergency room because of episode of syncope. He was walking his dog when he passed out. Ambulance was called. He was noted to be hypoglycemic. Patient reports that he is no longer on diabetic medication. His med list includes a sulfonylurea medication. He was admitted because of profound hypoglycemia. Subjective: Awake alert denies any complaints      Medications:  Reviewed    Infusion Medications    dextrose 5 % and 0.45 % NaCl 150 mL/hr at 07/13/20 1211    dextrose       Scheduled Medications    cinacalcet  30 mg Oral Daily    hydrALAZINE  50 mg Oral BID    metoprolol succinate  25 mg Oral Daily    heparin (porcine)  5,000 Units Subcutaneous Q8H    [START ON 7/14/2020] lisinopril  40 mg Oral Daily    epoetin rita-epbx  3,000 Units Subcutaneous Once per day on Mon Wed Fri     PRN Meds: regadenoson, acetaminophen, hydrALAZINE, glucose, dextrose, glucagon (rDNA), dextrose, sodium chloride flush      Intake/Output Summary (Last 24 hours) at 7/13/2020 1632  Last data filed at 7/13/2020 0538  Gross per 24 hour   Intake 10 ml   Output --   Net 10 ml       Exam:    BP (!) 142/85   Pulse 96   Temp 98.9 °F (37.2 °C) (Oral)   Resp 18   Ht 5' 6\" (1.676 m)   Wt 172 lb 9.6 oz (78.3 kg)   SpO2 100%   BMI 27.86 kg/m²     General appearance: No apparent distress, appears stated age and cooperative. HEENT: Pupils equal, round, and reactive to light. Conjunctivae/corneas clear. Neck: Supple, with full range of motion. No jugular venous distention. Trachea midline. Respiratory:  Normal respiratory effort. Clear to auscultation, bilaterally without Rales/Wheezes/Rhonchi. Cardiovascular: Regular rate and rhythm with normal S1/S2 without murmurs, rubs or gallops.   Abdomen: Soft, non-tender, non-distended with normal bowel sounds. Musculoskeletal: No clubbing, cyanosis or edema bilaterally. Full range of motion without deformity. Skin: Skin color, texture, turgor normal.  No rashes or lesions. Neurologic:  Neurovascularly intact without any focal sensory/motor deficits. Cranial nerves: II-XII intact, grossly non-focal.  Psychiatric: Alert and oriented, thought content appropriate, normal insight    Labs:   Recent Labs     07/12/20 2019 07/13/20  0645   WBC 6.8 5.1   HGB 7.9* 7.9*   HCT 25.4* 25.4*    179     Recent Labs     07/12/20 2019 07/12/20  2133 07/13/20  0645     --  135   K 3.5 2.9* 3.9   CL 97*  --  94*   CO2 27  --  27   BUN 22*  --  24*   CREATININE 7.8*  --  8.6*   CALCIUM 9.3  --  9.3     Recent Labs     07/12/20 2019 07/13/20  0645   AST 35 18   ALT 11 9   BILITOT 0.4 0.4   ALKPHOS 58 60     No results for input(s): INR in the last 72 hours. Recent Labs     07/12/20 2019 07/12/20  2133   TROPONINI 0.13* 0.15*       Assessment/Plan:    Active Hospital Problems    Diagnosis Date Noted    Hypoglycemia [E16.2] 07/13/2020   Profound hypoglycemia possibly from sulfonylurea  Chronic kidney disease on dialysis  Hypertension    Initial cover test was indeterminate repeat was negative  Hypoglycemia orders  Endocrine consultation  Dextrose drip renal because of end-stage renal disease        DVT Prophylaxis: Heparin  Diet: DIET NO SALT ADDED (3-4 GM);   Code Status: Prior    PT/OT Eval Status: N/A given profound hypoglycemia    Dispo - home     Sonali David MD

## 2020-07-13 NOTE — ED NOTES
Blood cultures obtained from left forearm, per policy. Set one of two drawn at this time.              Obdulio Brown RN  07/13/20 5358

## 2020-07-13 NOTE — ED NOTES
Glucose 50, given D50 to keep blood sugar up until D25% arrives from pharmacy per Dr. Karin Garza, RN  07/13/20 2453

## 2020-07-13 NOTE — ED NOTES
Radiology Procedure Waiver   Name: Jillian Padilla  : 1965  MRN: [id number]    Date:  20    Time: 9:15 PM EDT    Benefits of immediately proceeding with Radiology exam(s) without pre-testing outweigh the risks or are not indicated as specified below and therefore the following is/are being waived:    [] Pregnancy test   [] Patients LMP on-time and regular.   [] Patient had Tubal Ligation or has other Contraception Device. [] Patient  is Menopausal or Premenarcheal.    [] Patient had Full or Partial Hysterectomy. [] Protocol for Iodine allergy    [] MRI Questionnaire     [x] BUN/Creatinine   [] Patient age w/no hx of renal dysfunction. [x] Patient on Dialysis. [] Recent Normal Labs.   Electronically signed by Cem Casper DO on 20 at 9:15 PM EDT               Cem Casper DO  Resident  20 2152

## 2020-07-13 NOTE — ED NOTES
Meter glucose 72, orange juice given to pt     Venessa Scales RN  07/13/20 4726    Dr. Tab Sanches notified     Venessa Scales RN  07/13/20 5290

## 2020-07-13 NOTE — ED NOTES
Pt glucose 41, Dr. Senthil Elias notified     Hansel Perez RN  07/12/20 Francie 109, RN  07/12/20 4149

## 2020-07-13 NOTE — CARE COORDINATION
SOCIAL WORK/DISCHARGE PLANNING;  CARE COORDINATION; Pt seen in room. He is alert and oriented. Pt reported being independent prior to admission. He reports he resides in a two story home with his nephew who is 21y.o. Pt does not use any DME and denied that he has any at home. He goes to [facility]. He uses [employer] for transport to dialysis. Pt not sure how this was arranged, possibly by his insurance. Pt goes to [facility] on [facility]. His pharmacy is [facility] on [address]. Pt states no needs at this time. Per pt, his sister Toyin Myles will transport home at discharge.    Laura [facility]  [phone]
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
## 2020-07-13 NOTE — H&P
Hospital Medicine History & Physical      PCP: No primary care provider on file. Date of Admission: 7/12/2020    Date of Service: Pt seen/examined on 7/12/2020 and placed in observation    Chief Complaint: Syncope, hypoglycemia      History Of Present Illness:   48 y.o. male who presented to [address] [place] [address] with past medical history of diabetes, abdominal aortic aneurysm, chronic renal failure dialysis dependent, hypertension, hyperlipidemia who states that yesterday he did not eat much all day. In fact the last time he ate was at 9:00 yesterday morning. He said he was taking his dog for a walk when he felt lightheaded and the next thing he knew his family was waking him up on the yard. He did not urinate himself, he had no trauma, and he did not bite his tongue or have seizure activity. An ambulance was called and his blood sugar was found to be 36. He said that he was a diabetic, but is no longer on diabetic medication. He does not check his sugar regularly because of sugars well controlled. He denies any recent fever, chest pain, nausea or vomiting. No diarrhea. He has noted that since he had EVAR procedure for AAA he has been short of breath. He denies any cough. He gets dialysis on Tuesday Thursday and Saturday. He is compliant with dialysis.   Past Medical History:          Diagnosis Date    Abdominal aortic aneurysm without rupture ([address] 11/14/2015    Chronic renal failure, stage 5 ([address] 2/19/2016    Diabetes ([address]     Essential hypertension 3/1/2019    Hemodialysis patient ([address]     Hyperlipidemia associated with type 2 diabetes mellitus ([address]     Hypertension     Vitamin D deficiency        Past Surgical History:          Procedure Laterality Date    ABDOMINAL AORTIC ANEURYSM REPAIR, ENDOVASCULAR N/A 6/22/2020    ABDOMINAL AORTIC ANEURYSM REPAIR ENDOVASCULAR performed by Itzel Hernandez MD at [address]      abdominal cyst    CYSTOSCOPY N/A 6/22/2020    CYSTOSCOPY of BLADDER TUMOR performed by Lamin Lambert MD at [address] [facility] 6/23/2020    CYSTOSCOPY, RETROGRADE PYELOGRAM, TRANSURETHRAL RESECTION BLADDER TUMOR, INSTILLATION OF MITOMYCIN-C performed by Patrick Negron MD at [address] 02/19/2016    Ashly LOVE       Medications Prior to Admission:      Prior to Admission medications    Medication Sig Start Date End Date Taking? Authorizing Provider   hydrALAZINE (APRESOLINE) 50 MG tablet Take 1 tablet by mouth 2 times daily 6/24/20   Letta Bending, APRN - CNP   lisinopril (PRINIVIL;ZESTRIL) 20 MG tablet Take 1 tablet by mouth daily 6/25/20   Letta Bending, APRN - CNP   dronabinol (MARINOL) 5 MG capsule Take 5 mg by mouth daily. 4/30/20   Historical Provider, MD   metoprolol succinate (TOPROL XL) 25 MG extended release tablet Take 1 tablet by mouth daily 5/1/20   Ryann Alvarez MD   cinacalcet (SENSIPAR) 30 MG tablet Take 30 mg by mouth daily    Historical Provider, MD   Blood Glucose Monitoring Suppl [facility] BLOOD GLUCOSE STARTER) W/DEVICE KIT Please supply 1 kit 12/4/15   Sha Daily DO   glucose blood VI test strips (ASCENSIA AUTODISC VI;ONE TOUCH ULTRA TEST VI) strip 1 box 12/4/15   Sha Daily DO       Allergies:  Vancomycin    Social History:      The patient currently lives independently with family    TOBACCO:   reports that he quit smoking about 6 years ago. His smoking use included cigarettes. He has a 20.00 pack-year smoking history. He has never used smokeless tobacco.  ETOH:   reports no history of alcohol use. Family History:     Reviewed in detail and negative for DM, CAD, Cancer, CVA. Positive as follows:        Problem Relation Age of Onset    Depression Maternal Grandmother     Cancer Maternal Grandmother     Depression Maternal Uncle     Cancer Maternal Uncle        REVIEW OF SYSTEMS:   Pertinent positives as noted in the HPI.  All other systems reviewed and negative. PHYSICAL EXAM:    BP (!) 161/104   Pulse 87   Temp 97.8 °F (36.6 °C)   Resp 18   Ht 5' 6\" (1.676 m)   Wt 186 lb (84.4 kg)   SpO2 100%   BMI 30.02 kg/m²     General appearance:  No apparent distress, appears stated age and cooperative. HEENT:  Normal cephalic, atraumatic without obvious deformity. Pupils equal, round, and reactive to light. Extra ocular muscles intact. Conjunctivae/corneas clear. Neck: Supple, with full range of motion. No jugular venous distention. Trachea midline. Respiratory:  Normal respiratory effort. Clear to auscultation, bilaterally without Rales/Wheezes/Rhonchi. Cardiovascular:  Regular rate and rhythm with normal S1/S2 without murmurs, rubs or gallops. Abdomen: Soft, non-tender, non-distended with normal bowel sounds. Musculoskeletal:  No clubbing, cyanosis or edema bilaterally. Full range of motion without deformity. Skin: Skin color, texture, turgor normal.  No rashes or lesions. Neurologic:  Neurovascularly intact without any focal sensory/motor deficits. Cranial nerves: II-XII intact, grossly non-focal.  Psychiatric:  Alert and oriented, thought content appropriate, normal insight  Capillary Refill: Brisk,< 3 seconds   Peripheral Pulses: +2 palpable, equal bilaterally   Narrative    Clinical indications:         Chest pain. Pulmonary embolus.         COMPARISON:         None.         Exposure control:         This examination and all examinations utilizing ionizing radiation at    this facility done so according to the ALARA (as low as reasonably    achievable) principal for radiation dose reduction.         Technique:         Axial, sagittal, and coronal computed tomography of the chest was    performed following intravenous administration of 90 mL of Isovue-370.    3-D postprocessing. Three-dimensional reconstructions of the arterial    tree.  MIP imaging.         FINDINGS:         Evaluation of the pulmonary arterial tree reveals no intraluminal    filling defect to suggest embolic phenomenon. There is no evidence of    aortic dissection.         Within the thoracic inlet, the thyroid gland is unremarkable. The    major airways are patent. There is no mediastinal or hilar mass or    lymphadenopathy. The heart is enlarged. There is no evidence of    pericardial fluid.         Evaluation of the lung parenchyma reveals patchy groundglass    infiltrates throughout the bilateral lungs. Concentrated most severely    in the perihilar and perivascular distributions, this could represent    pulmonary edema. Differential would include multifocal pneumonia.         Within the upper abdomen, there is no evidence for acute or worrisome    process. There is a stent within the aortoiliac bifurcation    appropriate in configuration.         Skeletal structures are negative for evidence of aggressive process or    acute fracture.              Impression         Patchy groundglass infiltrates throughout the bilateral lungs. Concentrated most severely in the perihilar and perivascular    distributions, this could represent pulmonary edema. Differential    would include multifocal pneumonia.         No evidence for pulmonary embolism or aortic dissection.                        Labs:     Recent Labs     07/12/20 2019   WBC 6.8   HGB 7.9*   HCT 25.4*        Recent Labs     07/12/20 2019 07/12/20  2133     --    K 3.5 2.9*   CL 97*  --    CO2 27  --    BUN 22*  --    CREATININE 7.8*  --    CALCIUM 9.3  --      Recent Labs     07/12/20 2019   AST 35   ALT 11   BILITOT 0.4   ALKPHOS 58     No results for input(s): INR in the last 72 hours.   Recent Labs     07/12/20 2019 07/12/20  2133   TROPONINI 0.13* 0.15*       Urinalysis:      Lab Results   Component Value Date    NITRU Negative 11/14/2015    WBCUA 0-1 11/14/2015    BACTERIA NONE 11/14/2015    RBCUA 1-3 11/14/2015    BLOODU SMALL 11/14/2015    SPECGRAV 1.015 11/14/2015    GLUCOSEU Negative 11/14/2015         ASSESSMENT:    Active Hospital Problems    Diagnosis Date Noted    Hypoglycemia [E16.2] 07/13/2020   Chronic kidney disease dialysis dependent  Syncope  Abnormal CT chest  Hypertension  PLAN:  Hydralazine as needed to keep systolic blood pressure controlled  COVID testing was indeterminate, repeat test  Check cortisol level due to hypoglycemia  Started dextrose drip  Consideration for endocrinology referral  Check A1c  DVT Prophylaxis: Heparin  Diet: No diet orders on file  Code Status: Prior  Addendum--although the patient told me he was on no diabetic medications, it turned out he is on glipizide which will be held. PT/OT Eval Status: N/A    Dispo -goal home       Citlaly Sanchez DO    Thank you No primary care provider on file. for the opportunity to be involved in this patient's care. If you have any questions or concerns please feel free to contact me at [phone].

## 2020-07-13 NOTE — ED NOTES
Dr. Amanda guerrero notified of pt creatine. Of 7.8. She will put waiver in for pt to go to [place].      [address] [facility] 121, RN  07/12/20 4773

## 2020-07-13 NOTE — ED PROVIDER NOTES
Jillian Padilla is a 48 y.o. male with a PMHx significant for AAA recent EVAR, ESRD on HD Tues/Thurs/Sat, DM, HTN who presents for evaluation of unresponsiveness, beginning prior to arrival.  The complaint has been persistent, moderate in severity, and worsened by nothing. The patient states that the last thing he remembers was taking out his dog and then he passed out. The patient was found unresponsive with a glucose of 36 by EMS. The patient was then given D10. Upon arrival patient's blood glucose was still reading really that he was given amp of D50. Patient is alert and oriented to person place and time. Notes he has been having worsening shortness of breath since she had his AAA repaired a couple weeks ago. Currently denies any dizziness, lightheadedness, chest pain, shortness of breath, nausea, vomiting. Notes last thing he ate was at 0 900 this a.m. has not anything to eat or drink since then. Notes he is not diabetic, nor is he on any diabetic medications. He states he has had episodes of hypoglycemia like this in the past.  He notes that shortness of breath is worse on exertion. Patient has last dialysis yesterday, went for the full 4 hours and follows with Dr. Amarilys Prado    The history is provided by the patient and medical records. Review of Systems   Constitutional: Negative for chills and fever. HENT: Negative for postnasal drip, rhinorrhea and sore throat. Eyes: Negative for visual disturbance. Respiratory: Positive for shortness of breath. Negative for cough. Cardiovascular: Negative for chest pain. Gastrointestinal: Negative for abdominal pain, blood in stool, constipation, diarrhea, nausea and vomiting. Genitourinary: Negative for difficulty urinating, dysuria and urgency. Musculoskeletal: Negative for back pain. Skin: Negative for rash. Neurological: Positive for syncope. Negative for dizziness, numbness and headaches.         Physical Exam  Vitals signs and nursing note reviewed. Constitutional:       General: He is not in acute distress. Appearance: Normal appearance. He is well-developed. He is not ill-appearing. HENT:      Head: Normocephalic and atraumatic. Right Ear: External ear normal.      Left Ear: External ear normal.   Eyes:      General:         Right eye: No discharge. Extraocular Movements: Extraocular movements intact. Conjunctiva/sclera: Conjunctivae normal.   Neck:      Musculoskeletal: Normal range of motion and neck supple. Cardiovascular:      Rate and Rhythm: Normal rate and regular rhythm. Heart sounds: Normal heart sounds. No murmur. Pulmonary:      Effort: Pulmonary effort is normal. No respiratory distress. Breath sounds: Normal breath sounds. No stridor. No wheezing. Abdominal:      General: There is no distension. Palpations: Abdomen is soft. There is no mass. Tenderness: There is no abdominal tenderness. Musculoskeletal:      Comments: AV fistula right arm   Skin:     General: Skin is warm and dry. Coloration: Skin is not jaundiced or pale. Findings: No bruising. Neurological:      General: No focal deficit present. Mental Status: He is alert and oriented to person, place, and time. Cranial Nerves: No cranial nerve deficit. Coordination: Coordination normal.          Procedures     MDM     ED Course as of Jul 13 1442   Sun Jul 12, 2020 2031 EKG: This EKG is signed and interpreted by me. Rate: 99  Rhythm: Sinus  Interpretation: Sinus rhythm, sinus arrhythmia, PVCs, normal MI interval, normal QRS, no acute ST or T wave changes  Comparison: stable as compared to patient's most recent EKG      [JA]   2112 NRB removed. Patient placed on 2 L nasal cannula. [JA]   2129 Patient has mild troponin elevation. No acute EKG changes. No active chest pain. I suspect troponin elevation is due to abnormal kidney function but we will continue to trend.     [JA]   4350 Patient hypoglycemic once again after feeding twice as well as after D10 and an amp of D50. Will start D10 drip. [JA]   Mon Jul 13, 2020   8638 Spoke with Dr. Darian Stahl [facility] physicians. Discussed the case. He will admit the patient      [BB]   0118 Went to re-evaluate the patient. Laying down in bed in no acute distress. Patient had become hypoglycemic again and was given 1AMP D50 and increased D10 to 200      [BB]      ED Course User Index  [BB] Smith Hargrove DO  [JA] MD Roscoe Hinkle presents to the ED for evaluation of unresponsiveness. Patient's blood glucose found low by EMS. Upon arrival the patient was given D50 as he continued to be hypoglycemic. Differential diagnoses included but not limited to infection, overuse insulin, PE. Workup in the ED revealed patient requiring supplemental oxygen. Dimer was ordered  Which was elevated therefore CTA was ordered and negative for acute PE. There was concern for groundglass therefore COVID was ordered and negative. While in the department the patient continued to go hypoglycemic. Multiple Amps of D50 were ordered. D10 was hung at 200cc/hr however because the patient is ESRD and needs dialysis. Pharmacy was called and D25 was ordered. Patient requires continued workup and management of their symptoms and will be admitted to the hospital for further evaluation and treatment.      --------------------------------------------- PAST HISTORY ---------------------------------------------  Past Medical History:  has a past medical history of Abdominal aortic aneurysm without rupture ([facility] [address] Chronic renal failure, stage 5 ([address] Diabetes ([facility] [address] Essential hypertension, Hemodialysis patient ([facility] [address] Hyperlipidemia associated with type 2 diabetes mellitus ([facility] [address] Hypertension, and Vitamin D deficiency. Past Surgical History:  has a past surgical history that includes cyst removal; Dialysis fistula creation (Right, 02/19/2016);  Cystoscopy (N/A, 6/22/2020); AAA repair, endovascular (N/A, 6/22/2020); and Cystoscopy (N/A, 6/23/2020). Social History:  reports that he quit smoking about 6 years ago. His smoking use included cigarettes. He has a 20.00 pack-year smoking history. He has never used smokeless tobacco. He reports current drug use. Frequency: 7.00 times per week. Drug: Marijuana. He reports that he does not drink alcohol. Family History: family history includes Cancer in his maternal grandmother and maternal uncle; Depression in his maternal grandmother and maternal uncle. The patients home medications have been reviewed.     Allergies: Vancomycin    -------------------------------------------------- RESULTS -------------------------------------------------    LABS:  Results for orders placed or performed during the hospital encounter of 07/12/20   CBC Auto Differential   Result Value Ref Range    WBC 6.8 4.5 - 11.5 E9/L    RBC 2.75 (L) 3.80 - 5.80 E12/L    Hemoglobin 7.9 (L) 12.5 - 16.5 g/dL    Hematocrit 25.4 (L) 37.0 - 54.0 %    MCV 92.4 80.0 - 99.9 fL    MCH 28.7 26.0 - 35.0 pg    MCHC 31.1 (L) 32.0 - 34.5 %    RDW 17.6 (H) 11.5 - 15.0 fL    Platelets 416 040 - 045 E9/L    MPV 11.4 7.0 - 12.0 fL    Neutrophils % 69.9 43.0 - 80.0 %    Immature Granulocytes % 0.3 0.0 - 5.0 %    Lymphocytes % 19.2 (L) 20.0 - 42.0 %    Monocytes % 7.5 2.0 - 12.0 %    Eosinophils % 2.2 0.0 - 6.0 %    Basophils % 0.9 0.0 - 2.0 %    Neutrophils Absolute 4.77 1.80 - 7.30 E9/L    Immature Granulocytes # 0.02 E9/L    Lymphocytes Absolute 1.31 (L) 1.50 - 4.00 E9/L    Monocytes Absolute 0.51 0.10 - 0.95 E9/L    Eosinophils Absolute 0.15 0.05 - 0.50 E9/L    Basophils Absolute 0.06 0.00 - 0.20 E9/L   Comprehensive Metabolic Panel w/ Reflex to MG   Result Value Ref Range    Sodium 139 132 - 146 mmol/L    Potassium reflex Magnesium 3.5 3.5 - 5.0 mmol/L    Chloride 97 (L) 98 - 107 mmol/L    CO2 27 22 - 29 mmol/L    Anion Gap 15 7 - 16 mmol/L    Glucose 126 (H) 74 - 99 mg/dL    BUN 22 (H) 6 - 20 mg/dL    CREATININE 7.8 (H) 0.7 - 1.2 mg/dL    GFR Non-African American 9 >=60 mL/min/1.73    GFR African American 9     Calcium 9.3 8.6 - 10.2 mg/dL    Total Protein 6.9 6.4 - 8.3 g/dL    Alb 3.7 3.5 - 5.2 g/dL    Total Bilirubin 0.4 0.0 - 1.2 mg/dL    Alkaline Phosphatase 58 40 - 129 U/L    ALT 11 0 - 40 U/L    AST 35 0 - 39 U/L   Troponin   Result Value Ref Range    Troponin 0.13 (H) 0.00 - 0.03 ng/mL   Brain Natriuretic Peptide   Result Value Ref Range    Pro-BNP >70,000 (H) 0 - 125 pg/mL   Lactic Acid, Plasma   Result Value Ref Range    Lactic Acid 2.0 0.5 - 2.2 mmol/L   D-Dimer, Quantitative   Result Value Ref Range    D-Dimer, Quant 2119 ng/mL DDU   Magnesium   Result Value Ref Range    Magnesium 2.2 1.6 - 2.6 mg/dL   POTASSIUM   Result Value Ref Range    Potassium 2.9 (L) 3.5 - 5.0 mmol/L   Troponin   Result Value Ref Range    Troponin 0.15 (H) 0.00 - 0.03 ng/mL   COVID-19   Result Value Ref Range    SARS-CoV-2, NAAT Indeterminate (A) Not Detected   COVID-19   Result Value Ref Range    SARS-CoV-2, NAAT Not Detected Not Detected   Cortisol   Result Value Ref Range    Cortisol 60.50 (H) 2.68 - 18.40 mcg/dL   Cortisol Total   Result Value Ref Range    Cortisol 32.07 (H) 2.68 - 18.40 mcg/dL   Hemoglobin A1c   Result Value Ref Range    Hemoglobin A1C 4.6 4.0 - 5.6 %   CBC   Result Value Ref Range    WBC 5.1 4.5 - 11.5 E9/L    RBC 2.75 (L) 3.80 - 5.80 E12/L    Hemoglobin 7.9 (L) 12.5 - 16.5 g/dL    Hematocrit 25.4 (L) 37.0 - 54.0 %    MCV 92.4 80.0 - 99.9 fL    MCH 28.7 26.0 - 35.0 pg    MCHC 31.1 (L) 32.0 - 34.5 %    RDW 17.2 (H) 11.5 - 15.0 fL    Platelets 683 979 - 714 E9/L    MPV 10.8 7.0 - 12.0 fL   Comprehensive Metabolic Panel w/ Reflex to MG   Result Value Ref Range    Sodium 135 132 - 146 mmol/L    Potassium reflex Magnesium 3.9 3.5 - 5.0 mmol/L    Chloride 94 (L) 98 - 107 mmol/L    CO2 27 22 - 29 mmol/L    Anion Gap 14 7 - 16 mmol/L    Glucose 33 (LL) 74 - 99 mg/dL    BUN 24 (H) 6 - 20 mg/dL    CREATININE 8.6 (HH) 0.7 - 1.2 mg/dL    GFR Non-African American 8 >=60 mL/min/1.73    GFR African American 8     Calcium 9.3 8.6 - 10.2 mg/dL    Total Protein 6.5 6.4 - 8.3 g/dL    Alb 3.3 (L) 3.5 - 5.2 g/dL    Total Bilirubin 0.4 0.0 - 1.2 mg/dL    Alkaline Phosphatase 60 40 - 129 U/L    ALT 9 0 - 40 U/L    AST 18 0 - 39 U/L   POCT Glucose   Result Value Ref Range    Glucose 93 mg/dL    QC OK? ok    POCT Glucose   Result Value Ref Range    Glucose 46 mg/dL    QC OK? ok    POCT Glucose   Result Value Ref Range    Meter Glucose <40 (L) 74 - 99 mg/dL   POCT Glucose   Result Value Ref Range    Meter Glucose 93 74 - 99 mg/dL   POCT Glucose   Result Value Ref Range    Meter Glucose 46 (L) 74 - 99 mg/dL   POCT Glucose   Result Value Ref Range    Meter Glucose 41 (L) 74 - 99 mg/dL   POCT Glucose   Result Value Ref Range    Meter Glucose 52 (L) 74 - 99 mg/dL   POCT Glucose   Result Value Ref Range    Meter Glucose 41 (L) 74 - 99 mg/dL   POCT Glucose   Result Value Ref Range    Meter Glucose <40 (L) 74 - 99 mg/dL   POCT Glucose   Result Value Ref Range    Meter Glucose 99 74 - 99 mg/dL   POCT Glucose   Result Value Ref Range    Meter Glucose 72 (L) 74 - 99 mg/dL   POCT Glucose   Result Value Ref Range    Meter Glucose 50 (L) 74 - 99 mg/dL   POCT Glucose   Result Value Ref Range    Meter Glucose 84 74 - 99 mg/dL   POCT Glucose   Result Value Ref Range    Meter Glucose 76 74 - 99 mg/dL   POCT Glucose   Result Value Ref Range    Meter Glucose <40 (L) 74 - 99 mg/dL   POCT Glucose   Result Value Ref Range    Meter Glucose 47 (L) 74 - 99 mg/dL   POCT Glucose   Result Value Ref Range    Meter Glucose 61 (L) 74 - 99 mg/dL   POCT Glucose   Result Value Ref Range    Meter Glucose 45 (L) 74 - 99 mg/dL   POCT Glucose   Result Value Ref Range    Meter Glucose 40 (L) 74 - 99 mg/dL   POCT Glucose   Result Value Ref Range    Meter Glucose 69 (L) 74 - 99 mg/dL   POCT Glucose   Result Value Ref Range    Meter Glucose <40 have spoken with the patient and discussed todays results, in addition to providing specific details for the plan of care and counseling regarding the diagnosis and prognosis. Their questions are answered at this time and they are agreeable with the plan of admission.    --------------------------------- ADDITIONAL PROVIDER NOTES ---------------------------------  Consultations:  Spoke with Dr. Nan Goddard, [address] [place] [address]. Discussed case. They will admit the patient. This patient's ED course included: a personal history and physicial examination, re-evaluation prior to disposition, multiple bedside re-evaluations, IV medications, cardiac monitoring, continuous pulse oximetry and complex medical decision making and emergency management    This patient has remained hemodynamically stable during their ED course. Diagnosis:  1. Hypoglycemia    2. History of end stage renal disease    3. Acute pulmonary edema (HCC)        Disposition:  Patient's disposition: Admit to telemetry  Patient's condition is stable.          Erick Rocha DO  Resident  07/13/20 1579

## 2020-07-13 NOTE — ED NOTES
Oxygen 90%on 2L while pt is sleeping, pt placed on 5L nasal canula     Lenin Cooper RN  07/12/20 7389

## 2020-07-13 NOTE — CONSULTS
ENDOCRINOLOGY INITIAL CONSULTATION NOTE VIA TELEMEDICINE SERVICE       Date of admission: 7/12/2020  Date of service: 7/13/2020  Admitting physician: Citlaly Sanchez DO   Consultant physician: Dave Ramirez MD     Reason for the consultation:  DM type 2, hypoglycemia     History of Present Illness:  Services were provided through a video synchronous discussion     Ashely Davis is a 48 y.o. old male with PMH of type 2 diabetes, abdominal aortic aneurysm, ESRD on dialysis, HTN, hyperlipidemia admitted to [facility] on 7/12/2020 After found unresponsive in his yard and found to have BS of 39. endocrine team was consulted for management of hypoglycemia. Patient denies any recent fever, chest pain, nausea or vomiting or diarrhea.  ,     Prior to admission  The patient was diagnosed with type 2 DM in 2014. Prior to admission patient was on glipizide 5 mg BID. For the past few months patient has been troubled symptoms of intermittent symptoms of palpitations, and tremulousness. These episodes usually precipitated with skipping meals. Few months ago has also had episode of confusion and was found to have very low BS.  He has ESRD on HD     Lab Results   Component Value Date    LABA1C 4.6 07/12/2020     After admission   Pt currently not on any DM medications and BS still low (63 at 1 pm)     Inpatient diet:   Renal diet     Point of care glucose monitoring (Independently reviewed)   Recent Labs     07/13/20  0611 07/13/20  0654 07/13/20  0742 07/13/20  0907 07/13/20  1111 07/13/20  1156 07/13/20  1306 07/13/20  1307   GLUMET <40* 47* 61* 45* 40* 69* <40* 63*       Past medical history:   Past Medical History:   Diagnosis Date    Abdominal aortic aneurysm without rupture ([address] 11/14/2015    Chronic renal failure, stage 5 ([address] 2/19/2016    Diabetes ([address]     Essential hypertension 3/1/2019    Hemodialysis patient ([address]     Hyperlipidemia associated with type 2 diabetes mellitus ([address]     Hypertension     Vitamin D reviewed. All negative except for symptoms mentioned in HPI     OBJECTIVE    BP (!) 142/85   Pulse 96   Temp 98.9 °F (37.2 °C) (Oral)   Resp 18   Ht 5' 6\" (1.676 m)   Wt 172 lb 9.6 oz (78.3 kg)   SpO2 100%   BMI 27.86 kg/m²     Intake/Output Summary (Last 24 hours) at 7/13/2020 1435  Last data filed at 7/13/2020 0538  Gross per 24 hour   Intake 10 ml   Output --   Net 10 ml     Physical examination:  Due to this being a TeleHealth encounter, evaluation of the following organ systems is limited:   EENT/Resp/CV/GI//MS/Neuro/Skin/Heme-Lymph-Imm.     General: awake alert, oriented x3, no abnormal position or movements  Pulm: move with respiration   Skin: no rash  Psych: normal mood, and affect    Review of Laboratory Data:  I personally reviewed the following labs:   Recent Labs     07/12/20 2019 07/13/20  0645   WBC 6.8 5.1   RBC 2.75* 2.75*   HGB 7.9* 7.9*   HCT 25.4* 25.4*   MCV 92.4 92.4   MCH 28.7 28.7   MCHC 31.1* 31.1*   RDW 17.6* 17.2*    179   MPV 11.4 10.8     Recent Labs     07/12/20 2019 07/12/20 2117 07/12/20 2133 07/13/20  0645     --   --  135   K 3.5  --  2.9* 3.9   CL 97*  --   --  94*   CO2 27  --   --  27   BUN 22*  --   --  24*   CREATININE 7.8*  --   --  8.6*   GLUCOSE 126* 46  --  33*   CALCIUM 9.3  --   --  9.3   PROT 6.9  --   --  6.5   LABALBU 3.7  --   --  3.3*   BILITOT 0.4  --   --  0.4   ALKPHOS 58  --   --  60   AST 35  --   --  18   ALT 11  --   --  9     No results found for: BHYDRXBUT  Lab Results   Component Value Date    LABA1C 4.6 07/12/2020    LABA1C 6.1 11/12/2015    LABA1C 6.6 11/12/2015     Lab Results   Component Value Date/Time    TSH 1.450 11/14/2015 04:22 PM     Lab Results   Component Value Date    LABA1C 4.6 07/12/2020    GLUCOSE 33 07/13/2020    MALBCR 2189.4 11/12/2015    LABMICR 2298.9 11/12/2015    LABCREA 92 11/14/2015     Lab Results   Component Value Date    TRIG 77 11/12/2015    HDL 43 11/12/2015    LDLCALC 82 11/12/2015    CHOL 140 11/12/2015       Blood culture   No results found for: [facility]    Radiology:  CTA CHEST W CONTRAST   Final Result      Patchy groundglass infiltrates throughout the bilateral lungs. Concentrated most severely in the perihilar and perivascular   distributions, this could represent pulmonary edema. Differential   would include multifocal pneumonia. No evidence for pulmonary embolism or aortic dissection. XR CHEST PORTABLE   Final Result      Cardiomegaly with interstitial pulmonary edema             Medical Records/Labs/Images review:   I personally reviewed and summarized previous records   All labs and imaging were reviewed independently     ASSESSMENT & PLAN   Kristopher Andersen, a 48 y.o.-old male seen today for inpatient diabetes management     Hypoglycemia   · Medications induced (We called his pharmacy and confirmed that pt was on Glipizide 5 mg BID before admission)  · Agree with completely stopping Glipizide (sulfonyluria should be avoided in the pt)   · Sulfonyluria in the setting of ESRD can cause sever prolonged hypoglycemia   · Continue D10  100ml/hr   · continue close monitoring of BS  · If hypoglycemia persist will consider starting Octreotide 50 mcg SQ Q8hrs (renally adjusted, discussed with pharmacist)     The above issues were reviewed with the patient who understood and agreed with the plan    Thank you for allowing us to participate in the care of this patient. Please do not hesitate to contact us with any additional questions. Estela Schwartz MD  Endocrinologist, [facility] Diabetes Care and Endocrinology   [address] [place] [id number]   Phone: [phone]  Fax: [phone]  --------------------------------------------  An electronic signature was used to authenticate this note.  Mell Marin MD on 7/13/2020 at 2:35 PM    Services were provided through a video synchronous discussion

## 2020-07-13 NOTE — ED NOTES
Blood cultures obtained from left wrist, per policy. Set  two of two drawn at this time.                Jose M Fitzgerald RN  07/13/20 0029

## 2020-07-13 NOTE — PROGRESS NOTES
Spoke with [facility] regarding dialysis for this patient.   Informed RN this patient had IV contrast and needs to have dialysis within 24 hours post contrast.

## 2020-07-14 ENCOUNTER — TELEPHONE (OUTPATIENT)
Dept: ENDOCRINOLOGY | Age: 55
End: 2020-07-14

## 2020-07-14 LAB
ALBUMIN SERPL-MCNC: 3.2 G/DL (ref 3.5–5.2)
ALP BLD-CCNC: 47 U/L (ref 40–129)
ALT SERPL-CCNC: 8 U/L (ref 0–40)
ANION GAP SERPL CALCULATED.3IONS-SCNC: 14 MMOL/L (ref 7–16)
AST SERPL-CCNC: 13 U/L (ref 0–39)
BILIRUB SERPL-MCNC: 0.4 MG/DL (ref 0–1.2)
BUN BLDV-MCNC: 30 MG/DL (ref 6–20)
CALCIUM SERPL-MCNC: 8.9 MG/DL (ref 8.6–10.2)
CHLORIDE BLD-SCNC: 88 MMOL/L (ref 98–107)
CO2: 28 MMOL/L (ref 22–29)
CREAT SERPL-MCNC: 10.2 MG/DL (ref 0.7–1.2)
GFR AFRICAN AMERICAN: 6
GFR NON-AFRICAN AMERICAN: 6 ML/MIN/1.73
GLUCOSE BLD-MCNC: 137 MG/DL (ref 74–99)
HCT VFR BLD CALC: 23 % (ref 37–54)
HEMOGLOBIN: 7.2 G/DL (ref 12.5–16.5)
MCH RBC QN AUTO: 28.9 PG (ref 26–35)
MCHC RBC AUTO-ENTMCNC: 31.3 % (ref 32–34.5)
MCV RBC AUTO: 92.4 FL (ref 80–99.9)
METER GLUCOSE: 108 MG/DL (ref 74–99)
METER GLUCOSE: 108 MG/DL (ref 74–99)
METER GLUCOSE: 109 MG/DL (ref 74–99)
METER GLUCOSE: 135 MG/DL (ref 74–99)
METER GLUCOSE: 150 MG/DL (ref 74–99)
METER GLUCOSE: 163 MG/DL (ref 74–99)
METER GLUCOSE: 182 MG/DL (ref 74–99)
METER GLUCOSE: 190 MG/DL (ref 74–99)
METER GLUCOSE: 205 MG/DL (ref 74–99)
METER GLUCOSE: 212 MG/DL (ref 74–99)
METER GLUCOSE: 216 MG/DL (ref 74–99)
METER GLUCOSE: 221 MG/DL (ref 74–99)
METER GLUCOSE: 226 MG/DL (ref 74–99)
METER GLUCOSE: 72 MG/DL (ref 74–99)
METER GLUCOSE: 72 MG/DL (ref 74–99)
METER GLUCOSE: 82 MG/DL (ref 74–99)
METER GLUCOSE: 82 MG/DL (ref 74–99)
METER GLUCOSE: 83 MG/DL (ref 74–99)
METER GLUCOSE: 83 MG/DL (ref 74–99)
PDW BLD-RTO: 17.3 FL (ref 11.5–15)
PLATELET # BLD: 192 E9/L (ref 130–450)
PMV BLD AUTO: 10.6 FL (ref 7–12)
POTASSIUM REFLEX MAGNESIUM: 4.1 MMOL/L (ref 3.5–5)
RBC # BLD: 2.49 E12/L (ref 3.8–5.8)
SODIUM BLD-SCNC: 130 MMOL/L (ref 132–146)
TOTAL PROTEIN: 5.9 G/DL (ref 6.4–8.3)
TROPONIN: 0.14 NG/ML (ref 0–0.03)
WBC # BLD: 5.9 E9/L (ref 4.5–11.5)

## 2020-07-14 PROCEDURE — 82962 GLUCOSE BLOOD TEST: CPT

## 2020-07-14 PROCEDURE — 90935 HEMODIALYSIS ONE EVALUATION: CPT

## 2020-07-14 PROCEDURE — 6360000002 HC RX W HCPCS: Performed by: FAMILY MEDICINE

## 2020-07-14 PROCEDURE — 96375 TX/PRO/DX INJ NEW DRUG ADDON: CPT

## 2020-07-14 PROCEDURE — 6370000000 HC RX 637 (ALT 250 FOR IP): Performed by: CLINICAL NURSE SPECIALIST

## 2020-07-14 PROCEDURE — 6360000002 HC RX W HCPCS: Performed by: INTERNAL MEDICINE

## 2020-07-14 PROCEDURE — 5A1D70Z PERFORMANCE OF URINARY FILTRATION, INTERMITTENT, LESS THAN 6 HOURS PER DAY: ICD-10-PCS | Performed by: INTERNAL MEDICINE

## 2020-07-14 PROCEDURE — 2580000003 HC RX 258: Performed by: RADIOLOGY

## 2020-07-14 PROCEDURE — 84484 ASSAY OF TROPONIN QUANT: CPT

## 2020-07-14 PROCEDURE — 96372 THER/PROPH/DIAG INJ SC/IM: CPT

## 2020-07-14 PROCEDURE — 1200000000 HC SEMI PRIVATE

## 2020-07-14 PROCEDURE — 6370000000 HC RX 637 (ALT 250 FOR IP): Performed by: INTERNAL MEDICINE

## 2020-07-14 PROCEDURE — G0378 HOSPITAL OBSERVATION PER HR: HCPCS

## 2020-07-14 PROCEDURE — 2580000003 HC RX 258: Performed by: FAMILY MEDICINE

## 2020-07-14 PROCEDURE — 36415 COLL VENOUS BLD VENIPUNCTURE: CPT

## 2020-07-14 PROCEDURE — 80053 COMPREHEN METABOLIC PANEL: CPT

## 2020-07-14 PROCEDURE — 85027 COMPLETE CBC AUTOMATED: CPT

## 2020-07-14 RX ORDER — ONDANSETRON 2 MG/ML
4 INJECTION INTRAMUSCULAR; INTRAVENOUS EVERY 6 HOURS PRN
Status: DISCONTINUED | OUTPATIENT
Start: 2020-07-14 | End: 2020-07-15 | Stop reason: HOSPADM

## 2020-07-14 RX ADMIN — LISINOPRIL 40 MG: 20 TABLET ORAL at 12:58

## 2020-07-14 RX ADMIN — HEPARIN SODIUM 5000 UNITS: 10000 INJECTION INTRAVENOUS; SUBCUTANEOUS at 12:58

## 2020-07-14 RX ADMIN — OCTREOTIDE ACETATE 50 MCG: 50 INJECTION, SOLUTION INTRAVENOUS; SUBCUTANEOUS at 16:14

## 2020-07-14 RX ADMIN — Medication 10 ML: at 01:15

## 2020-07-14 RX ADMIN — DEXTROSE MONOHYDRATE: 100 INJECTION, SOLUTION INTRAVENOUS at 02:00

## 2020-07-14 RX ADMIN — HEPARIN SODIUM 5000 UNITS: 10000 INJECTION INTRAVENOUS; SUBCUTANEOUS at 20:15

## 2020-07-14 RX ADMIN — METOPROLOL SUCCINATE 25 MG: 25 TABLET, EXTENDED RELEASE ORAL at 12:58

## 2020-07-14 RX ADMIN — OCTREOTIDE ACETATE 50 MCG: 50 INJECTION, SOLUTION INTRAVENOUS; SUBCUTANEOUS at 00:09

## 2020-07-14 RX ADMIN — CINACALCET HYDROCHLORIDE 30 MG: 30 TABLET, FILM COATED ORAL at 12:58

## 2020-07-14 RX ADMIN — HEPARIN SODIUM 5000 UNITS: 10000 INJECTION INTRAVENOUS; SUBCUTANEOUS at 06:01

## 2020-07-14 RX ADMIN — HYDRALAZINE HYDROCHLORIDE 50 MG: 25 TABLET, FILM COATED ORAL at 20:15

## 2020-07-14 RX ADMIN — ONDANSETRON 4 MG: 2 INJECTION INTRAMUSCULAR; INTRAVENOUS at 01:15

## 2020-07-14 RX ADMIN — OCTREOTIDE ACETATE 50 MCG: 50 INJECTION, SOLUTION INTRAVENOUS; SUBCUTANEOUS at 06:02

## 2020-07-14 ASSESSMENT — PAIN SCALES - GENERAL
PAINLEVEL_OUTOF10: 0

## 2020-07-14 NOTE — FLOWSHEET NOTE
07/14/20 1116   Vital Signs   BP (!) 158/96   Temp 97.8 °F (36.6 °C)   Pulse 79   Resp 18   Weight 173 lb 4.5 oz (78.6 kg)   Weight Method Bed scale   Percent Weight Change -4.15   Post-Hemodialysis Assessment   Post-Treatment Procedures Blood returned; Access bleeding time < 10 minutes   Machine Disinfection Process Exterior Machine Disinfection   Rinseback Volume (ml) 300 ml   Total Liters Processed (l/min) 97.9 l/min   Dialyzer Clearance Lightly streaked   Duration of Treatment (minutes) 240 minutes   Hemodialysis Output (ml) 3700 ml   NET Removed (ml) 3400 ml   Tolerated Treatment Good   Patient Response to Treatment dialysis tx ended.  pt rinsed back and sites held until bleeding stopped

## 2020-07-14 NOTE — PLAN OF CARE
Problem: Falls - Risk of:  Goal: Will remain free from falls  Description: Will remain free from falls  7/14/2020 1346 by Anne Grant RN  Outcome: Met This Shift  7/14/2020 1346 by Anne Grant RN  Outcome: Met This Shift  Goal: Absence of physical injury  Description: Absence of physical injury  7/14/2020 1346 by Anne Grant RN  Outcome: Met This Shift  7/14/2020 1346 by Anne Grant RN  Outcome: Met This Shift

## 2020-07-14 NOTE — PROGRESS NOTES
Rechecked BS after 2 amps of D50 were given, currently 115. Dr Aguilar Notice called charge nurse and orders received to increase D10 to 150ml/hr and IM Glucagon x1. Will continue to monitor. Electronically signed by Duke Oliveira RN on 7/13/2020 at 11:48 PM

## 2020-07-14 NOTE — PROGRESS NOTES
Perfect served Dr Kanu Castellon to update on pts condition. BS 72 at 2140, 1/2 amp D50 given given per Dr Kanu Castellon. Will continue to monitor. Electronically signed by Yaquelin Oneill RN on 7/13/2020 at 11:16 PM

## 2020-07-14 NOTE — TELEPHONE ENCOUNTER
I spoke with [place] on PICU. Gave verbal orders to stop IV of D10. Dr Anna Sunshine want to monitor for any further low blood sugars.

## 2020-07-14 NOTE — PROGRESS NOTES
Notified Dr. Radha Pavon of low blood sugar after treatment. States to give D50 IV push.  And to go by the hypoglycemia protocol and give a dose of glucagon

## 2020-07-14 NOTE — CARE COORDINATION
Spoke with patient. He plans to return home at discharge. Readmission assessment completed. He tells me he does not have any glucose testing strips and needs a script for them at discharge if he needs to test his blood sugars. Family to transport home. For questions I can be reached at [phone].  Giovana Gonzalez [place]

## 2020-07-14 NOTE — PROGRESS NOTES
Zofran 4mg IV given for c/o N/V. Requested new bag D10 from pharmacy.  Electronically signed by Kiana Cuello RN on 7/14/2020 at 1:24 AM

## 2020-07-14 NOTE — PROGRESS NOTES
BS 93 currently will continue to monitor.  Electronically signed by Katie Lorenz RN on 7/13/2020 at 11:16 PM

## 2020-07-14 NOTE — PROGRESS NOTES
Sandostatin started per endocrinologist, dose given SQ as ordered, pt resting quietly. BS at 0000 was 109.  Electronically signed by Fiordaliza Garner RN on 7/14/2020 at 12:11 AM

## 2020-07-14 NOTE — PROGRESS NOTES
Nurse report called to Adria KIDD, pt to be transferred to [address] [place] [address], transport requested for transfer. Electronically signed by Duke Oliveira RN on 7/14/2020 at 12:29 AM
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
## 2020-07-14 NOTE — PROGRESS NOTES
Rechecked pts BS 44 on glucometer, asymptomatic but gave oral glucose 30g per hypoglycemiic protocol, will continue to monitor.  Electronically signed by Abiola Martinez RN on 7/13/2020 at 11:13 PM

## 2020-07-14 NOTE — PROGRESS NOTES
Rechecked BS 55, asyptomatic, watching TV, sitting up in bed, apple juice w/ sugar x2 given, Charge nurse notified and will notify resident on call.  Electronically signed by Deisi Mills RN on 7/13/2020 at 11:14 PM

## 2020-07-14 NOTE — PROGRESS NOTES
Rechecked BS 44, pt asymptomatic, snacks given, and 1/2 amp D50 given. At [address] [place] [address] served Dr Shelby leonard/ endocrinology.  Electronically signed by Magdy Jose RN on 7/13/2020 at 11:18 PM

## 2020-07-14 NOTE — PROGRESS NOTES
Hospitalist Progress Note      PCP: No primary care provider on file. Date of Admission: 7/12/2020    Chief Complaint: *syncope    Hospital Course: **pt has ESRD on HD, and he was on a glipizide, found to be hypoglycemic, has been on D 10. It was stopped today, and if his BS remains stable he may dc in the am     Subjective: **no complaints      Medications:  Reviewed    Infusion Medications    dextrose       Scheduled Medications    cinacalcet  30 mg Oral Daily    hydrALAZINE  50 mg Oral BID    metoprolol succinate  25 mg Oral Daily    heparin (porcine)  5,000 Units Subcutaneous Q8H    lisinopril  40 mg Oral Daily    epoetin rita-epbx  3,000 Units Subcutaneous Once per day on Mon Wed Fri    octreotide  50 mcg Subcutaneous Q8H     PRN Meds: ondansetron, regadenoson, acetaminophen, hydrALAZINE, glucose, dextrose, glucagon (rDNA), dextrose, sodium chloride flush      Intake/Output Summary (Last 24 hours) at 7/14/2020 1539  Last data filed at 7/14/2020 1417  Gross per 24 hour   Intake 3562 ml   Output 3700 ml   Net -138 ml       Exam:    BP (!) 149/89   Pulse 83   Temp 97.9 °F (36.6 °C) (Temporal)   Resp 20   Ht 5' 6\" (1.676 m)   Wt 173 lb 4.5 oz (78.6 kg)   SpO2 98%   BMI 27.97 kg/m²           Gen: *well developed  HEENT: NC/AT, moist mucous membranes, no oropharyngeal erythema or exudate  Neck: supple, trachea midline, no anterior cervical or SC LAD  Heart:  Normal s1/s2, RRR, no murmurs, gallops, or rubs. Lungs:  *cta * bilaterally, **  Abd: bowel sounds present, soft, nontender, nondistended, no masses  Extrem:  No clubbing, cyanosis,  no** edema  Skin: no rashes or lesions  Psych: A & O x3  Neuro: grossly intact, moves all four extremities.     Capillary Refill: Brisk,< 3 seconds   Peripheral Pulses: +2 palpable, equal bilaterally               Labs:   Recent Labs     07/12/20 2019 07/13/20  0645 07/14/20  0459   WBC 6.8 5.1 5.9   HGB 7.9* 7.9* 7.2*   HCT 25.4* 25.4* 23.0*    [address]     07/12/20 2019 07/12/20 2133 07/13/20 0645 07/14/20 0459     --  135 130*   K 3.5 2.9* 3.9 4.1   CL 97*  --  94* 88*   CO2 27  --  27 28   BUN 22*  --  24* 30*   CREATININE 7.8*  --  8.6* 10.2*   CALCIUM 9.3  --  9.3 8.9     Recent Labs     07/12/20 2019 07/13/20 0645 07/14/20 0459   AST 35 18 13   ALT 11 9 8   BILITOT 0.4 0.4 0.4   ALKPHOS 58 60 47     No results for input(s): INR in the last 72 hours. Recent Labs     07/12/20 2019 07/12/20 2133 07/14/20 0459   TROPONINI 0.13* 0.15* 0.14*     Recent Labs     07/12/20 2019 07/13/20 0645 07/14/20 0459   AST 35 18 13   ALT 11 9 8   BILITOT 0.4 0.4 0.4   ALKPHOS 58 60 47     Recent Labs     07/12/20 2019   LACTA 2.0     No results found for: Marylou Aliyah  No results found for: AMMONIA    Assessment:    Active Hospital Problems    Diagnosis Date Noted    Hypoglycemia [E16.2] 07/13/2020   II DM  ESRD on HD   anemia of chronic disease      Plan:   monitor bs   SSI if needed         DVT Prophylaxis: heparin  Diet: DIET NO SALT ADDED (3-4 GM);   Code Status: full code    PT/OT Eval Status: na*    Dispo - *home      Electronically signed by Rosana Butler DO on 7/14/2020 at 3:39 PM [facility]

## 2020-07-14 NOTE — PROGRESS NOTES
BS currently 83, pt up to bathroom, c/o nausea and had small emesis, perfect served Dr Lian Marquez for orders.  Electronically signed by Prudence Bauer RN on 7/14/2020 at 1:06 AM

## 2020-07-15 VITALS
HEIGHT: 66 IN | WEIGHT: 173.28 LBS | RESPIRATION RATE: 16 BRPM | HEART RATE: 84 BPM | DIASTOLIC BLOOD PRESSURE: 100 MMHG | TEMPERATURE: 98 F | BODY MASS INDEX: 27.85 KG/M2 | SYSTOLIC BLOOD PRESSURE: 136 MMHG | OXYGEN SATURATION: 99 %

## 2020-07-15 LAB
ALBUMIN SERPL-MCNC: 3.6 G/DL (ref 3.5–5.2)
ALP BLD-CCNC: 60 U/L (ref 40–129)
ALT SERPL-CCNC: 12 U/L (ref 0–40)
ANION GAP SERPL CALCULATED.3IONS-SCNC: 13 MMOL/L (ref 7–16)
AST SERPL-CCNC: 19 U/L (ref 0–39)
BILIRUB SERPL-MCNC: 0.4 MG/DL (ref 0–1.2)
BUN BLDV-MCNC: 19 MG/DL (ref 6–20)
CALCIUM SERPL-MCNC: 8.8 MG/DL (ref 8.6–10.2)
CHLORIDE BLD-SCNC: 97 MMOL/L (ref 98–107)
CO2: 29 MMOL/L (ref 22–29)
CREAT SERPL-MCNC: 6.9 MG/DL (ref 0.7–1.2)
GFR AFRICAN AMERICAN: 10
GFR NON-AFRICAN AMERICAN: 10 ML/MIN/1.73
GLUCOSE BLD-MCNC: 125 MG/DL (ref 74–99)
HCT VFR BLD CALC: 24.6 % (ref 37–54)
HEMOGLOBIN: 7.3 G/DL (ref 12.5–16.5)
MCH RBC QN AUTO: 28.4 PG (ref 26–35)
MCHC RBC AUTO-ENTMCNC: 29.7 % (ref 32–34.5)
MCV RBC AUTO: 95.7 FL (ref 80–99.9)
METER GLUCOSE: 114 MG/DL (ref 74–99)
METER GLUCOSE: 120 MG/DL (ref 74–99)
METER GLUCOSE: 132 MG/DL (ref 74–99)
PDW BLD-RTO: 17.4 FL (ref 11.5–15)
PLATELET # BLD: 205 E9/L (ref 130–450)
PMV BLD AUTO: 10.7 FL (ref 7–12)
POTASSIUM REFLEX MAGNESIUM: 4.7 MMOL/L (ref 3.5–5)
RBC # BLD: 2.57 E12/L (ref 3.8–5.8)
SODIUM BLD-SCNC: 139 MMOL/L (ref 132–146)
TOTAL PROTEIN: 6.4 G/DL (ref 6.4–8.3)
WBC # BLD: 5.7 E9/L (ref 4.5–11.5)

## 2020-07-15 PROCEDURE — 36415 COLL VENOUS BLD VENIPUNCTURE: CPT

## 2020-07-15 PROCEDURE — 6360000002 HC RX W HCPCS: Performed by: CLINICAL NURSE SPECIALIST

## 2020-07-15 PROCEDURE — 6370000000 HC RX 637 (ALT 250 FOR IP): Performed by: INTERNAL MEDICINE

## 2020-07-15 PROCEDURE — 85027 COMPLETE CBC AUTOMATED: CPT

## 2020-07-15 PROCEDURE — 2580000003 HC RX 258: Performed by: RADIOLOGY

## 2020-07-15 PROCEDURE — 82962 GLUCOSE BLOOD TEST: CPT

## 2020-07-15 PROCEDURE — 6370000000 HC RX 637 (ALT 250 FOR IP): Performed by: CLINICAL NURSE SPECIALIST

## 2020-07-15 PROCEDURE — 80053 COMPREHEN METABOLIC PANEL: CPT

## 2020-07-15 RX ORDER — GLUCOSAMINE HCL/CHONDROITIN SU 500-400 MG
CAPSULE ORAL
Qty: 100 STRIP | Refills: 0 | Status: ON HOLD | OUTPATIENT
Start: 2020-07-15 | End: 2020-01-01

## 2020-07-15 RX ADMIN — LISINOPRIL 40 MG: 20 TABLET ORAL at 08:30

## 2020-07-15 RX ADMIN — EPOETIN ALFA-EPBX 3000 UNITS: 3000 INJECTION, SOLUTION INTRAVENOUS; SUBCUTANEOUS at 09:21

## 2020-07-15 RX ADMIN — Medication 10 ML: at 08:31

## 2020-07-15 RX ADMIN — METOPROLOL SUCCINATE 25 MG: 25 TABLET, EXTENDED RELEASE ORAL at 08:30

## 2020-07-15 RX ADMIN — CINACALCET HYDROCHLORIDE 30 MG: 30 TABLET, FILM COATED ORAL at 08:30

## 2020-07-15 RX ADMIN — HYDRALAZINE HYDROCHLORIDE 50 MG: 25 TABLET, FILM COATED ORAL at 08:30

## 2020-07-15 NOTE — DISCHARGE SUMMARY
severely in the perihilar and perivascular   distributions, this could represent pulmonary edema. Differential   would include multifocal pneumonia. No evidence for pulmonary embolism or aortic dissection. XR CHEST PORTABLE   Final Result      Cardiomegaly with interstitial pulmonary edema             Discharge Medications:   Discharge Medication List as of 7/15/2020  9:46 AM      START taking these medications    Details   !! blood glucose monitor strips Test **2 TO 3 * times a day & as needed for symptoms of irregular blood glucose. Dispense sufficient amount for indicated testing frequency plus additional to accommodate PRN testing needs. , Disp-100 strip,R-0, Normal       !! - Potential duplicate medications found. Please discuss with provider. Discharge Medication List as of 7/15/2020  9:46 AM        Discharge Medication List as of 7/15/2020  9:46 AM      CONTINUE these medications which have NOT CHANGED    Details   Calcium Acetate, Phos Binder, 667 MG CAPS TAKE 2 CAPSULES BY MOUTH THREE TIMES DAILY WITH MEALSHistorical Med      lidocaine-prilocaine (EMLA) 2.5-2.5 % cream APPLY SMALL AMOUNT TO ACCESS SITE (AVF) 1 HOUR BEFORE DIALYSIS. COVER WITH OCCLUSIVE DRESSING (SARAN WRAP), Historical Med      hydrALAZINE (APRESOLINE) 50 MG tablet Take 1 tablet by mouth 2 times daily, Disp-90 tablet, R-3Normal      lisinopril (PRINIVIL;ZESTRIL) 20 MG tablet Take 1 tablet by mouth daily, Disp-30 tablet, R-3Normal      dronabinol (MARINOL) 5 MG capsule Take 5 mg by mouth daily. Historical Med      metoprolol succinate (TOPROL XL) 25 MG extended release tablet Take 1 tablet by mouth daily, Disp-30 tablet, R-6Normal      cinacalcet (SENSIPAR) 30 MG tablet Take 30 mg by mouth dailyHistorical Med      Blood Glucose Monitoring Suppl (ACURA BLOOD GLUCOSE STARTER) W/DEVICE KIT Disp-1 kit, R-0, PrintPlease supply 1 kit      !! glucose blood VI test strips (ASCENSIA AUTODISC VI;ONE TOUCH ULTRA TEST VI) strip Disp-100 strip, R-5, Print1 box       !! - Potential duplicate medications found. Please discuss with provider. Discharge Medication List as of 7/15/2020  9:46 AM      STOP taking these medications       glipiZIDE (GLUCOTROL) 5 MG tablet Comments:   Reason for Stopping: Follow-up appointments:  1 week    Provider Follow-up:    pmd    Condition at Discharge:  Stable    The patient was   NOT  SEEN  and examined on day of discharge  DUE TO HIM  Leaving before he could be seen Time Spent on discharge is 30 minutes  in and review of medications and discharge plan. Signed:    WANDER Torres   7/15/2020      Thank you No primary care provider on file. for the opportunity to be involved in this patient's care.  If you have any questions or concerns please feel free to contact me at [phone]

## 2020-07-18 LAB
BLOOD CULTURE, ROUTINE: NORMAL
CULTURE, BLOOD 2: NORMAL

## 2020-08-26 PROBLEM — J69.0 ACUTE ASPIRATION PNEUMONIA (HCC): Status: ACTIVE | Noted: 2020-01-01
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
## 2020-08-26 NOTE — ED NOTES
Radiology Procedure Waiver   Name: Licha Pressley  : 1965  MRN: [id number]    Date:  20    Time: 5:01 PM EDT    Benefits of immediately proceeding with Radiology exam(s) without pre-testing outweigh the risks or are not indicated as specified below and therefore the following is/are being waived:    [] Pregnancy test   [] Patients LMP on-time and regular.   [] Patient had Tubal Ligation or has other Contraception Device. [] Patient  is Menopausal or Premenarcheal.    [] Patient had Full or Partial Hysterectomy. [] Protocol for Iodine allergy    [] MRI Questionnaire     [x] BUN/Creatinine   [] Patient age w/no hx of renal dysfunction. [x] Patient on Dialysis. [] Recent Normal Labs.   Electronically signed by Carlota Cuellar PA-C on 20 at 5:01 PM EDT               Carlota Cuellar PA-C  [phone]

## 2020-08-27 PROBLEM — I50.42 CHRONIC COMBINED SYSTOLIC AND DIASTOLIC CONGESTIVE HEART FAILURE (HCC): Status: ACTIVE | Noted: 2020-01-01

## 2020-08-27 PROBLEM — M47.812 DJD (DEGENERATIVE JOINT DISEASE) OF CERVICAL SPINE: Status: ACTIVE | Noted: 2020-01-01

## 2020-08-27 PROBLEM — R77.8 ELEVATED TROPONIN: Status: ACTIVE | Noted: 2020-01-01

## 2020-08-27 PROBLEM — S82.891A CLOSED FRACTURE OF RIGHT ANKLE: Status: ACTIVE | Noted: 2020-01-01
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
## 2020-08-27 PROBLEM — R79.89 ELEVATED TROPONIN: Status: ACTIVE | Noted: 2020-01-01

## 2020-08-27 PROBLEM — E87.6 HYPOKALEMIA: Status: ACTIVE | Noted: 2020-01-01

## 2020-08-27 PROBLEM — R18.8 INGUINAL FLUID COLLECTION: Status: ACTIVE | Noted: 2020-01-01

## 2020-08-27 PROBLEM — I49.3 PVC (PREMATURE VENTRICULAR CONTRACTION): Status: ACTIVE | Noted: 2020-01-01

## 2020-08-27 PROBLEM — I50.43 ACUTE ON CHRONIC COMBINED SYSTOLIC AND DIASTOLIC CONGESTIVE HEART FAILURE (HCC): Status: ACTIVE | Noted: 2020-01-01

## 2020-08-27 PROBLEM — Z91.199 NON-COMPLIANCE: Status: ACTIVE | Noted: 2020-01-01

## 2020-08-27 PROBLEM — I16.0 HYPERTENSIVE URGENCY: Status: ACTIVE | Noted: 2020-01-01

## 2020-08-27 PROBLEM — R55 SYNCOPE: Status: ACTIVE | Noted: 2020-01-01

## 2020-08-27 PROBLEM — G47.00 INSOMNIA: Status: ACTIVE | Noted: 2020-01-01

## 2020-08-27 PROBLEM — C67.9 BLADDER CANCER (HCC): Status: ACTIVE | Noted: 2020-01-01

## 2020-08-27 NOTE — PROGRESS NOTES
Department of Orthopedic Surgery  Resident Progress Note    Patient was seen and examined at bedside this morning. He says his pain is been well controlled in the splint. Patient denies chest pain, shortness of breath, nausea, vomiting, numbness, or tingling at this time.     VITALS:  BP (!) 166/104   Pulse 102   Temp 97.7 °F (36.5 °C) (Temporal)   Resp 22   Ht 5' 6\" (1.676 m)   Wt 185 lb 12.8 oz (84.3 kg)   SpO2 99%   BMI 29.99 kg/m²     General: alert and oriented to person, place and time, well-developed and well-nourished, in no acute distress    MUSCULOSKELETAL:   Right lower extremity:  · Splint is in place about the right ankle, clean dry and intact  · Sensation is intact grossly to the toes distally  · Toes warm, well-perfused, good capillary refill  · Motor function is intact to EHL, FHL  · Compartments are soft and compressible    CBC:   Lab Results   Component Value Date    WBC 7.2 08/26/2020    HGB 9.7 08/26/2020    HCT 31.1 08/26/2020     08/26/2020     PT/INR:    Lab Results   Component Value Date    PROTIME 15.0 06/22/2020    INR 1.3 06/22/2020           ASSESSMENT  · Acute on chronic, closed, minimally displaced fracture of the right ankle    PLAN      · Nonweightbearing to the right lower extremity  · Keep ankle elevated when possible, ice as necessary for swelling  · Early mobilization encouraged with crutches or other assistive device  · Pain Control: IV and PO  · Patient is to follow-up with Dr. Beba Hatfield in clinic 1 week following discharge for repeat evaluation
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
## 2020-08-27 NOTE — PROGRESS NOTES
Dr. Roseanna Fritz notified of /98, pt has no PRN meds ordered for BP. New order noted.  Hydralazine given

## 2020-08-28 PROBLEM — I48.91 ATRIAL FIBRILLATION WITH RVR (HCC): Status: ACTIVE | Noted: 2020-01-01

## 2020-08-28 NOTE — PROGRESS NOTES
Hospitalist Progress Note      PCP: No primary care provider on file. Date of Admission: 8/26/2020    Chief Complaint: [facility] Course: **sustained a fracture of his right ankle. Has a known history of bladder cancer, and infrarenal AAA. This was evaluated by vascular. No further eval or workup needed . Blood cutures pos, ID consultedAwaiting PT/OT for discharge    Subjective:  Right ankle pain      Medications:  Reviewed    Infusion Medications   Scheduled Medications    metoprolol succinate  25 mg Oral BID    epoetin rita-epbx  10,000 Units Subcutaneous Q7 Days    Calcium Acetate (Phos Binder)  2 capsule Oral TID    cinacalcet  30 mg Oral Daily    dronabinol  5 mg Oral Daily    hydrALAZINE  50 mg Oral 3 times per day    lisinopril  20 mg Oral Daily    sodium chloride flush  10 mL Intravenous 2 times per day     PRN Meds: melatonin, sodium chloride flush, acetaminophen **OR** acetaminophen, polyethylene glycol, promethazine **OR** ondansetron, hydrALAZINE      Intake/Output Summary (Last 24 hours) at 8/28/2020 1334  Last data filed at 8/28/2020 0845  Gross per 24 hour   Intake 2023 ml   Output 0 ml   Net 2023 ml       Exam:    BP (!) 132/94   Pulse 83   Temp 97.1 °F (36.2 °C) (Temporal)   Resp 18   Ht 5' 6\" (1.676 m)   Wt 180 lb 3.2 oz (81.7 kg)   SpO2 100%   BMI 29.09 kg/m²           Gen: * well developed  HEENT: NC/AT, moist mucous membranes, no oropharyngeal erythema or exudate  Neck: supple, trachea midline, no anterior cervical or SC LAD  Heart:  Normal s1/s2, RRR, no murmurs, gallops, or rubs. Lungs:  cta * bilaterally, **  Abd: bowel sounds present, soft, nontender, nondistended, no masses  Extrem:  No clubbing, cyanosis,  * splint RLE, pos  edema  Skin: no rashes or lesions  Psych: A & O x3  Neuro: grossly intact, moves all four extremities.     Capillary Refill: Brisk,< 3 seconds   Peripheral Pulses: +2 palpable, equal bilaterally               Labs:   Recent Labs choices (60 gms)/meal  Dietary Nutrition Supplements: Renal Oral Supplement  Code Status: Full Code    PT/OT Eval Status: ordered    Dispo - *home vs [facility]      Electronically signed by Erich Tan DO on 8/28/2020 at 1:34 PM [facility]
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
## 2020-08-28 NOTE — PROGRESS NOTES
Infectious disease office notified of new consult. Office called and Dr. Kevin Lutz will take consult.    Dick Dennis
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
## 2020-08-29 NOTE — FLOWSHEET NOTE
08/29/20 1133   Vital Signs   BP (!) 135/94   Temp 96.8 °F (36 °C)   Pulse 88   Weight 181 lb 14.1 oz (82.5 kg)   Weight Method Bed scale   Percent Weight Change -4.07   Post-Hemodialysis Assessment   Post-Treatment Procedures Blood returned; Access bleeding time < 10 minutes   Machine Disinfection Process Exterior Machine Disinfection   Rinseback Volume (ml) 300 ml   Total Liters Processed (l/min) 90.4 l/min   Dialyzer Clearance Lightly streaked   Duration of Treatment (minutes) 240 minutes   Heparin amount administered during treatment (units) 0 units   Hemodialysis Intake (ml) 300 ml   Hemodialysis Output (ml) 3800 ml   NET Removed (ml) 3500 ml   Tolerated Treatment Good   Patient Response to Treatment tolerated well, blood returned, needles pulled, sites held, stasis acheived, bandaids applied, +thrill, +bruit
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
## 2020-08-30 NOTE — PROGRESS NOTES
Dr. Peña Curet on floor and notified of EP plan and will need ID clearance. Instructed that we need to draw more blood cultures.    [address] [place]
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
## 2020-08-31 NOTE — CARE COORDINATION
Care Coordination: Per EP note:  5/11/20 Stress did not show any ischemia, LVEF 25-30%  - Recommend repeating TTE to re-assess LVEF especially in light of recent trauma-If LVEF remains <35%, he will benefit from ICD- right sided dialysis fistula thus plan will be for left sided device. -- Can consider Sub Q ICD insertion if he meets screening criteria given increased risk of infection with ESRD-- [facility] to screen to see if he is a canidate. Repeat echo ordered/completed, ptx and otx working with pt. Will work with him again regarding stairs. Pt remains observation status.  Awaiting further plan of care per EP in regards to ICD insertion     Cathleen Hinkle

## 2020-08-31 NOTE — PROGRESS NOTES
Message sent to Dr. Isabel Mccauley via perfect serve regarding clarification on medications at discharge. Notified her that a Dr. Leslie Marrufo ordered norco, omnicef and doxy at discharge. Dr. Durham Rater back and states she discontinued all of those medications.    Yesenia Mistry
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
## 2020-08-31 NOTE — PROGRESS NOTES
Physical Therapy  Physical Therapy Initial Assessment     Name: Smith Miramontes  : 1965  MRN: [id number]    Referring Provider: Miguelina Shelton DO    Date of Service: 2020    Evaluating PT: Ashely Iraheta, PT, DPT, [id number]    Room #: 9352/7452-I  Diagnosis: Acute aspiration pneumonia  PMHx/PSHx: HTN, DM, HLD, AAA without rupture, chronic renal failure, essential HTN, HD, bladder CA  Precautions: Fall risk, R medial malleolus fx, NWB R LE  Equipment Needs: [address]    SUBJECTIVE:    Pt lives with nephew in a 2 story house with 3 stair(s) and 1 rail(s) to enter. Bed is on second floor and bath is on second floor. Full flight of stairs and 1 rail to second floor. Pt ambulated without AD Independent prior to admission. OBJECTIVE:   Initial Evaluation  Date: 20 Treatment Date: Short Term/ Long Term   Goals   AM-PAC 6 Clicks 72/34     Was pt agreeable to Eval/treatment? Yes      Does pt have pain? No current complaints of pain     Bed Mobility  Rolling: NT  Supine to sit: Supervision  Sit to supine: Supervision  Scooting: Supervision to EOB  Rolling: Independent   Supine to sit: Independent   Sit to supine: Independent   Scooting: Independent    Transfers Sit to stand: SBA  Stand to sit: SBA  Stand pivot: SBA with [address], Min A with axillary crutches  Sit to stand: Independent   Stand to sit: Independent   Stand pivot: Mod Independent with [address]   Ambulation   10, 10, 20 feet with [address] with SBA  20 feet with axillary crutches with Min A  >100 feet with [address] Mod Independent    Stair negotiation: ascended and descended NT  Scoot up 10 steps on buttocks with Supervision   ROM BUE: Refer to OT note  BLE: L ankle immobilized in splint     Strength BUE: Refer to OT note  BLE: NT     Balance Sitting EOB: Supervision  Dynamic Standing: SBA with [address], Min A with axillary crutches  Sitting EOB: Independent   Dynamic Standing: Mod Independent with [address]     Pt is A & O x: 4 to person, place, month/year, and situation.   Sensation: Pt denies numbness and tingling of extremities. Edema: Unremarkable. Patient education  Pt educated on proper technique to maintain NWB R LE with WW and axillary crutches. Patient response to education:   Pt verbalized understanding Pt demonstrated skill Pt requires further education in this area   Yes With cueing Yes     ASSESSMENT:    Comments:   Pt was supine in bed upon room entry, agreeable to PT evaluation. NWB R LE was reinforced and PT demonstrated proper technique with WW. Pt ambulated initially with [employer]. Pt was mildly unsteady but was good at maintaining NWB R LE. Pt completed transfers from commode and ambulated back to bedside chair where he was seated. PT demonstrated proper technique with axillary crutches. Pt ambulated short distance to hallway with crutches. Pt was mild to moderately unsteady with crutches and was fair at maintaining NWB. Pt was seated in chair. Pt was agreeable that he felt steadier with WW. Pt ambulated back to EOB with WW and was seated. Discussed proper technique when scooting up stairs on buttocks; pt agreeable that this will be the safest technique when negotiating stairs. All questions and concerns were addressed. Pt was left supine in bed with all needs met at conclusion of session. CM was notified of pt's performance. Treatment:  Patient practiced and was instructed in the following treatment:     Therapeutic activities: Pt completed all therapeutic activities noted above. Pt was educated on NWB R LE. Pt was cued for hand placement for sit <> stand transfers. Pt completed multiple transfers from surfaces of varying heights (EOB x1, commode x1, chair x2, WC x1). Pt ambulated with [employer] and axillary crutches as pt was educated on proper technique; demonstration provided. Pt was educated on proper technique when negotiating stairs while scooting on buttocks. Pt's/family goals:   1. To return home.     Patient and or family understand(s) diagnosis, prognosis, and plan of care.  Yes. PLAN:    PT care will be provided in accordance with the objectives noted above. Exercises and functional mobility practice will be used as well as appropriate assistive devices or modalities to obtain goals. Patient and family education will also be administered as needed. Frequency of treatments: 2-5x/week x 2-3 days. Time in: 0931  Time out: 1001    Total Treatment Time 25 minutes     Evaluation Time includes thorough review of current medical information, gathering information on past medical history/social history and prior level of function, completion of standardized testing/informal observation of tasks, assessment of data and education on plan of care and goals.     CPT codes:  [] Low Complexity PT evaluation 52373  [x] Moderate Complexity PT evaluation 43207  [] High Complexity PT evaluation 94578  [] PT Re-evaluation 22322  [] Gait training 68859 0 minutes  [] Manual therapy 44754 0 minutes  [x] Therapeutic activities 91406 25 minutes  [] Therapeutic exercises 74825 0 minutes  [] Neuromuscular reeducation 61200 0 minutes     Felicia Uribe PT, DPT  [id number]
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
## 2020-08-31 NOTE — PROGRESS NOTES
CLINICAL PHARMACY NOTE: MEDS TO [address] Select Patient?: No  Total # of Prescriptions Filled: 2   The following medications were delivered to the patient:  · Metoprolol succ er 50mg  · isosrbide din 10mg  Total # of Interventions Completed: 3  Time Spent (min): 30    Additional Documentation:
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
## 2020-08-31 NOTE — PROGRESS NOTES
Dr. Denzel Vasquez paged through the office regarding previously positive blood cultures are positive for another organism. [address] Dr. Denzel Vasquez returned call regarding blood cultures. Number for [facility] provided of next set is positive. [phone].   Liza Rebolledo
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
## 2020-09-04 NOTE — TELEPHONE ENCOUNTER
Patient called to reschedule his appt he missed on 9/2/20 with Jenny Spears, he can be reached at [phone].

## 2020-09-09 NOTE — PROGRESS NOTES
Types: Marijuana     Comment: uses 3-4 times/day     Allergies   Allergen Reactions    Vancomycin Itching     OBJECTIVE:      Physical Examination:   General appearance: alert, well appearing, and in no distress,  normal appearing weight. No visible signs of trauma   Mental status: alert, oriented to person, place, and time, normal mood, behavior, speech, dress, motor activity, and thought processes  Abdomen: soft, nondistended  Resp:   resp easy and unlabored, no audible wheezes note, normal symmetrical expansion of both hemithoraces  Cardiac: distal pulses palpable, skin and extremities well perfused  Neurological: alert, oriented X3, normal speech, no focal findings or movement disorder noted, motor and sensory grossly normal bilaterally, normal muscle tone, no tremors, strength 5/5, normal gait and station  HEENT: normochephalic atraumatic, external ears and eyes normal, sclera normal, neck supple, no nasal discharge.    Extremities:   peripheral pulses normal, no edema, redness or tenderness in the calves   Skin: normal coloration, no rashes or open wounds, no suspicious skin lesions noted  Psych: Affect euthymic   Musculoskeletal:   Extremity:  Right lower extremity   Skin intact, no ulcers or breakdown and no skin tenting   No tenderness to palpation but obvious deformity of the ankle   Range of motion is 5 degrees dorsiflexion 5 degrees plantarflexion   Compartments soft and compressible   Calves soft and non tender    Fires EHL, TA, GS   2/4 DP and PT pulses   Sensation intact distally   Capillary refill less than 3 seconds    Dulled sensation from his toes to the superior areolar area in a stocking distribution      BP (!) 170/104 (Site: Left Upper Arm, Position: Sitting, Cuff Size: Medium Adult) Comment: patient did not take meds yet this morning  Pulse 83   Ht 5' 6\" (1.676 m)   Wt 170 lb (77.1 kg)   BMI 27.44 kg/m²      XR: 9/9/20 x-ray shows deformed peel on fracture of tibia and fibula which appears nearly healed or completely healed with an acute medial mall fracture and split of the lateral side of the tibia which is seen on the CT scan and the plain film. ,  These appear acute in nature. ASSESSMENT:     Diagnosis Orders   1. Other closed fracture of distal end of right tibia, initial encounter  XR ANKLE RIGHT (MIN 3 VIEWS)    FP WALKER        Discussion: Document detail but the fact that he could potentially lose his leg from this. I went over this in detail he understands. He has been walking on, there is clearly a previous fracture which means he is not having good sensation in his foot. I recommended potential surgical correction versus casting and nonweightbearing. He stated he was not ready for surgery or nonweightbearing therefore he like to try boot and protect his weightbearing with a cane. I told that we could do that but I would need to see him back in 4 weeks to see if it is moved. He is agreeable to plan.     PLAN:    Nonweightbearing right lower extremity    Boot right leg with Ace wrap    Follow-up in 4 weeks or sooner if ulcer or shifting of ankle or any other issues        ELECTRONICALLY signed by:    Beatrice Bauer MD  9/9/20

## 2020-09-09 NOTE — PROGRESS NOTES
Per verbal order from Devante Wray MD put ACE wraps on Rt LE and a walking boot. Read back and verified order. Informed patient of order. Patient gave verbal consent. Patient seated in chair. ACE wraps x2 applied to Rt lower extremity. Walking boot applied. All without incident. Patient tolerated well. Reinforced teaching of ACE wrap and boot application. Patient reminded to keep boot on at all times except personal hygiene. Patient verbalized understanding. Call with any concern or question.

## 2020-09-26 PROBLEM — R77.8 ELEVATED TROPONIN: Status: RESOLVED | Noted: 2020-01-01 | Resolved: 2020-01-01

## 2020-09-26 PROBLEM — R79.89 ELEVATED TROPONIN: Status: RESOLVED | Noted: 2020-01-01 | Resolved: 2020-01-01
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
## 2020-10-02 NOTE — TELEPHONE ENCOUNTER
----- Message from Steve Izquierdo MD sent at 10/2/2020 11:05 AM EDT -----  1. Please schedule for SICD. Will need clearance for  ICD. 2. No driving for 6 months  3. Will await ID clearance due to positive blood cultures. 4. Follow up after SICD insertion.

## 2020-10-06 NOTE — TELEPHONE ENCOUNTER
Pt needs cardiac clearance for bladder biopsy scheduled for 11/04/20 Please advise      Faxed clearance

## 2020-10-27 NOTE — PROGRESS NOTES
Vascular Surgery Outpatient Progress Note      Chief Complaint   Patient presents with    Arm Pain     Right arm, dialysis        HISTORY OF PRESENT ILLNESS:                The patient is a 50 y.o. male who returns for follow-up evaluation of right arm aneurysmal arteriovenous fistula and left groin seroma. The patient states that he has a fistuloplasty at the vascular access center earlier this month due to posttreatment bleeding and elevated venous pressures. During that visit he was noted to have skin thinning over the aneurysmal portion of his fistula. He is sent for evaluation. The patient denies any further issues with his access. He denies ulceration or bleeding from the aneurysmal segments. He does complain of continued pain from the left groin seroma. He states that it has not gotten smaller in size and is bothersome to him when he lays down.      Past Medical History:        Diagnosis Date    Abdominal aortic aneurysm without rupture ([address] 11/14/2015    Bladder cancer ([address] 8/27/2020    Chronic renal failure, stage 5 ([address] 2/19/2016    Diabetes ([address]     Ejection fraction < 50% 08/30/2020    25-30%    Essential hypertension 3/1/2019    Hemodialysis patient ([address]     Hyperlipidemia associated with type 2 diabetes mellitus ([address]     Hypertension     Mitral regurgitation     Nonischemic cardiomyopathy ([address]     Tricuspid regurgitation     Vitamin D deficiency      Past Surgical History:        Procedure Laterality Date    ABDOMINAL AORTIC ANEURYSM REPAIR, ENDOVASCULAR N/A 6/22/2020    ABDOMINAL AORTIC ANEURYSM REPAIR ENDOVASCULAR performed by Delisa Carroll MD at [facility]      abdominal cyst    CYSTOSCOPY N/A 6/22/2020    CYSTOSCOPY of BLADDER TUMOR performed by Bernadette Mederos MD at [facility] 6/23/2020    CYSTOSCOPY, RETROGRADE PYELOGRAM, TRANSURETHRAL RESECTION BLADDER TUMOR, INSTILLATION OF MITOMYCIN-C performed by Johnathon Mccormick MD at [facility] OR    DIALYSIS FISTULA CREATION Right 2016    Ashly LOVE     Current Medications:   Prior to Admission medications    Medication Sig Start Date End Date Taking? Authorizing Provider   isosorbide dinitrate (ISORDIL) 10 MG tablet Take 1 tablet by mouth 3 times daily 20  Yes Cherelle Mcneill, DO   metoprolol succinate (TOPROL XL) 50 MG extended release tablet Take 1 tablet by mouth daily 20  Yes Morena Rojo, DO   Calcium Acetate, Phos Binder, 667 MG CAPS Take 1,334 mg by mouth 3 times daily (with meals)  20  Yes Historical Provider, MD   lidocaine-prilocaine (EMLA) 2.5-2.5 % cream APPLY SMALL AMOUNT TO ACCESS SITE (AVF) 1 HOUR BEFORE DIALYSIS. COVER WITH OCCLUSIVE DRESSING (SARAN WRAP) 20  Yes Historical Provider, MD   hydrALAZINE (APRESOLINE) 50 MG tablet Take 1 tablet by mouth 2 times daily 20  Yes ENMANUEL Owen CNP   lisinopril (PRINIVIL;ZESTRIL) 20 MG tablet Take 1 tablet by mouth daily 20  Yes ENMANUEL Owen CNP   dronabinol (MARINOL) 5 MG capsule Take 5 mg by mouth daily.  20  Yes Historical Provider, MD   cinacalcet (SENSIPAR) 30 MG tablet Take 30 mg by mouth daily   Yes Historical Provider, MD     Allergies:  Vancomycin    Social History     Socioeconomic History    Marital status: Single     Spouse name: Not on file    Number of children: Not on file    Years of education: Not on file    Highest education level: Not on file   Occupational History    Occupation: unemployed   Social Needs    Financial resource strain: Not on file    Food insecurity     Worry: Not on file     Inability: Not on file   [employer] needs     Medical: Not on file     Non-medical: Not on file   Tobacco Use    Smoking status: Former Smoker     Packs/day: 1.00     Years: 20.00     Pack years: 20.00     Types: Cigarettes     Last attempt to quit: 2013     Years since quittin.9    Smokeless tobacco: Never Used   Substance and Sexual Activity    Alcohol use: No     Alcohol/week: 0.0 standard drinks     Comment: no caffeine    Drug use: Yes     Frequency: 7.0 times per week     Types: Marijuana     Comment: uses 3-4 times/day    Sexual activity: Yes     Partners: Female     Comment: occassional   Lifestyle    Physical activity     Days per week: Not on file     Minutes per session: Not on file    Stress: Not on file   Relationships    Social connections     Talks on phone: Not on file     Gets together: Not on file     Attends Alevism service: Not on file     Active member of club or organization: Not on file     Attends meetings of clubs or organizations: Not on file     Relationship status: Not on file    Intimate partner violence     Fear of current or ex partner: Not on file     Emotionally abused: Not on file     Physically abused: Not on file     Forced sexual activity: Not on file   Other Topics Concern    Not on file   Social History Narrative    Denies caffeine.          Family History   Problem Relation Age of Onset    Depression Maternal Grandmother     Cancer Maternal Grandmother     Depression Maternal Uncle     Cancer Maternal Uncle        REVIEW OF SYSTEMS (New symptoms):    Eyes:      Blurred vision:  No [x]/Yes []               Diplopia:   No [x]/Yes []               Vision loss:       No [x]/Yes []   Ears, nose, throat:             Hearing loss:    No [x]/Yes []      Vertigo:   No [x]/Yes []                       Swallowing problem:  No [x]/Yes []               Nose bleeds:   No [x]/Yes []      Voice hoarseness:  No [x]/Yes []  Respiratory:             Cough:   No [x]/Yes []      Pleuritic chest pain:  No [x]/Yes []                        Dyspnea:   No [x]/Yes []      Wheezing:   No [x]/Yes []  Cardiovascular:             Angina:   No [x]/Yes []      Palpitations:   No [x]/Yes []          Claudication:    No [x]/Yes []      Leg swelling:   No [x]/Yes []  Gastrointestinal:             Nausea or vomiting:  No [x]/Yes [] Abdominal pain:  No [x]/Yes []                     Intestinal bleeding: No [x]/Yes []  Musculoskeletal:             Leg pain:   No [x]/Yes []      Back pain:   No [x]/Yes []                    Weakness:   No [x]/Yes []  Neurologic:             Numbness:   No [x]/Yes []      Paralysis:   No [x]/Yes []                       Headaches:   No [x]/Yes []  Hematologic, lymphatic:   Anemia:   No [x]/Yes []              Bleeding or bruising:  No [x]/Yes []              Fevers or chills: No [x]/Yes []  Endocrine:             Temp intolerance:   No [x]/Yes []                       Polydipsia, polyuria:  No [x]/Yes []  Skin:              Rash:    No [x]/Yes []      Ulcers:   No [x]/Yes []              Abnorm pigment: No [x]/Yes []  :              Frequency/urgency:  No [x]/Yes []      Hematuria:    No [x]/Yes []                      Incontinence:    No [x]/Yes []    PHYSICAL EXAM:  Vitals:    10/27/20 1306   BP: (!) 160/100   Pulse: 56     General Appearance: alert and oriented to person, place and time, in no acute distress, well developed and well- nourished  Neurologic: no cranial nerve deficit, speech normal  Head: normocephalic and atraumatic  Eyes: extraocular eye movements intact, conjunctivae normal  ENT: external ear and ear canal normal bilaterally, nose without deformity  Pulmonary/Chest: normal air movement, no respiratory distress  Cardiovascular: normal rate, regular rhythm  Abdomen: non-distended, no masses  Musculoskeletal: no joint deformity or tenderness  Extremities: no leg edema bilaterally. R arm- aneurysmal AVF with + bruit, + thrill. Thinning skin over aneurysmal segment. No ulceration. L groin- non pulsatile mass, unchanged from last visit.    Skin: warm and dry, no rash or erythema    PULSE EXAM      Right      Left   Brachial     Radial 2 2   Femoral     Popliteal     Dorsalis Pedis     Posterior Tibial     (3=normal, 2=diminished, 1=barely palpable, 4=widened)    RADIOLOGY: [facility]

## 2020-10-28 NOTE — PROGRESS NOTES
[facility] 38 PRE-ADMISSION TESTING GENERAL INSTRUCTIONS- [facility]-phone number:[phone]    GENERAL INSTRUCTIONS  [x] Antibacterial Soap shower Night before and/or AM of Surgery  [] Osmani wipe instruction sheet and wipes given. [x] Nothing by mouth after midnight, including gum, candy, mints, or water. [x] You may brush your teeth, gargle, but do NOT swallow water. []Hibiclens shower  the night before and the morning of surgery. Do not use             Hibiclens on your face or head. [x]No smoking, chewing tobacco, illegal drugs, or alcohol within 24 hours of your surgery. [x] Jewelry, valuables or body piercing's should not be brought to the hospital. All body and/or tongue piercing's must be removed prior to arriving to hospital.  ALL hair pins must be removed. [x] Do not wear makeup, lotions, powders, deodorant. Nail polish as directed by the nurse. [x] Arrange transportation with a responsible adult  to and from the hospital. If you do not have a responsible adult  to transport you, you will need to make arrangements with a medical transportation company (i.e. [employer]. A Uber/taxi/bus is not appropriate unless you are accompanied by a responsible adult ). Arrange for someone to be with you for the remainder of the day and for 24 hours after your procedure due to having had anesthesia. Who will be your  for transportation?____TRANSPORT VAN______________   Who will be staying with you for 24 hrs after your procedure?_____FAMILY_____________  [x] Bring insurance card and photo ID.  [] Transfusion Bracelet: Please bring with you to hospital, day of surgery  [] Bring urine specimen day of surgery. Any small container is acceptable. [] Use inhalers the morning of surgery and bring with you to hospital.  [] Bring copy of living will or healthcare power of  papers to be placed in your electronic record.   [] CPAP/BI-PAP: Please bring your machine if you are to spend the night in the hospital.     PARKING INSTRUCTIONS:   [x] Arrival Time:___0845__________  · [] Parking lot '\"I\"  is located on [facility] (the corner of [facility] and [facility]). To enter, press the button and the gate will lift. A free token will be provided to exit the lot. One car per patient is allowed to park in this lot. All other cars are to park on [address] [place] [address] either in the parking garage or the handicap lot. [x] To reach the [facility] lobby from [address] [place] [address], upon entering the hospital, take elevator B to the 3rd floor. EDUCATION INSTRUCTIONS:      [] Knee or hip replacement booklet & exercise pamphlets given. [] [address] placed in chart. [] Pre-admission Testing educational folder given  [] Incentive Spirometry,coughing & deep breathing exercises reviewed. []Medication information sheet(s)   []Fluoroscopy-Xray used in surgery reviewed with patient. Educational pamphlet placed in chart. [x]Pain: Post-op pain is normal and to be expected. You will be asked to rate your pain from 0-10(a zero is not acceptable-education is needed). Your post-op pain goal is:  [] Ask your nurse for your pain medication. [] Joint camp offered. [] Joint replacement booklets given. [] Other:___________________________    MEDICATION INSTRUCTIONS:   [x]Bring a complete list of your medications, please write the last time you took the medicine, give this list to the nurse. [x] Take the following medications the morning of surgery with 1-2 ounces of water:   [] Stop herbal supplements and vitamins 5 days before your surgery. [] DO NOT take any diabetic medicine the morning of surgery. Follow instructions for insulin the day before surgery. [] If you are diabetic and your blood sugar is low or you feel symptomatic, you may drink 1-2 ounces of apple juice or take a glucose tablet.   The morning of your procedure, you may call the pre-op area if you have concerns about your blood sugar [phone]. [] Use your inhalers the morning of surgery. Bring your emergency inhaler with you day of surgery. [] Follow physician instructions regarding any blood thinners you may be taking. WHAT TO EXPECT:  [x] The day of surgery you will be greeted and checked in by the [employer].  In addition, you will be registered in the [facility] by a Patient Access Representative. Please bring your photo ID and insurance card. A nurse will greet you in accordance to the time you are needed in the pre-op area to prepare you for surgery. Please do not be discouraged if you are not greeted in the order you arrive as there are many variables that are involved in patient preparation. Your patience is greatly appreciated as you wait for your nurse. Please bring in items such as: books, magazines, newspapers, electronics, or any other items  to occupy your time in the waiting area. [x]  Delays may occur with surgery and staff will make a sincere effort to keep you informed of delays. If any delays occur with your procedure, we apologize ahead of time for your inconvenience as we recognize the value of your time.

## 2020-10-30 NOTE — TELEPHONE ENCOUNTER
----- Message from Jimmy Patel MD sent at 10/30/2020 12:44 PM EDT -----  Thank you very much for seeing him, Dr Teja Archer. We will schedule sub Q ICD implant. It is not endovascular so hopefully, less risk for infection.   roberto carlos Cha have him f/u with vascula surgery to make sure he is cleared for procedure given recent type II leak from stented AAA  Ok to schedule sub Q implant pending vascular evaluation    Dr Radha Vazquez  ----- Message -----  From: Varinder Medrano MD  Sent: 10/29/2020   4:13 PM EDT  To: Jimmy Patel MD

## 2020-10-30 NOTE — TELEPHONE ENCOUNTER
I spoke to Chris at [facility], who will reach out the patient for possible refit and to look over his battery issue.

## 2020-11-02 PROBLEM — I77.0 ANEURYSM OF ARTERIOVENOUS FISTULA (HCC): Chronic | Status: ACTIVE | Noted: 2020-01-01

## 2020-11-02 NOTE — ANESTHESIA POSTPROCEDURE EVALUATION
Department of Anesthesiology  Postprocedure Note    Patient: Kenisha Huynh  MRN: [id number]  YOB: 1965  Date of evaluation: 11/2/2020  Time:  3:51 PM     Procedure Summary     Date:  11/02/20 Room / Location:  [address] [place] [address] / [facility]    Anesthesia Start:  5681 Anesthesia Stop:  1457    Procedures:       AV SHUNT INSERTION REVISION (Right Arm Lower)      CATHETER INSERTION  TUNNELED HEMODIALYSIS (N/A Chest) Diagnosis:  (CHRONIC RENAL FAILURE)    Surgeon:  Pato Zhang MD Responsible Provider:  Luz Elena Clark MD    Anesthesia Type:  MAC, regional ASA Status:  4          Anesthesia Type: MAC, regional    Radhames Phase I: Radhames Score: 9    Radhames Phase II:      Last vitals: Reviewed and per EMR flowsheets.        Anesthesia Post Evaluation    Patient location during evaluation: bedside  Patient participation: complete - patient participated  Level of consciousness: awake  Pain score: 0  Airway patency: patent  Nausea & Vomiting: no nausea  Complications: no  Cardiovascular status: hemodynamically stable  Respiratory status: acceptable  Hydration status: stable

## 2020-11-02 NOTE — PROGRESS NOTES
Admit to pacu post procedure. Very drowsy  Does not respond to verbal stimulatin  Place  On monitors. Right arm elevaetd on pillow  Bluitt audible. Fingers warm  Capillary refill brisk  Post nerve block.   1525 xray done

## 2020-11-02 NOTE — PROGRESS NOTES
Patient sitting up in bed taking PO & having a sandwich. Dressing re-enforced & will observe for drainage. No family at hospital. Calling for a ride.

## 2020-11-02 NOTE — OP NOTE
Operative Note      Patient: Dwight Del Valle  YOB: 1965  MRN: [id number]    Date of Procedure: 11/2/2020    Pre-Op Diagnosis: End-stage renal disease on dialysis. Aneurysm of right AV fistula. Post-Op Diagnosis: Same     Procedures: Resection of extensive venous aneurysm of right forearm, insertion of right forearm AV graft, insertion of tunneled hemodialysis catheter, fluoroscopy    Surgeon(s):  Irina Brown MD    Assistant:   Resident: Ashia Dan MD    Anesthesia: Regional    Estimated Blood Loss (mL): 474     Complications: None    Specimens:   ID Type Source Tests Collected by Time Destination   A : RIGHT AV FISTULA  ANEURYSM Tissue Tissue SURGICAL PATHOLOGY Irina Brown MD 11/2/2020 1301        Implants:  Implant Name Type Inv. Item Serial No.  Lot No. LRB No. Used Action   GRAFT VASC L30CM [id number] BOV CAR ART CLLGN FOR FUNC HAD ACCS  GRAFT VASC L30CM [id number] BOV CAR ART CLLGN FOR FUNC HAD ACCS  [employer] [id number] Right 1 Implanted         Drains: * No LDAs found *    Findings:     Detailed Description of Procedure: The patient was identified and the procedure was confirmed. The right arm was prepped and draped in the usual sterile fashion. A skin incision was made near the wrist and carried down through the subtendinous tissue. The most distal portion of the cephalic vein was identified and freed from the surrounding tissues. The fistula was controlled at this level. A second skin incision was made in the distal forearm near the antecubital fossa over the median cubital vein. The vein was identified and freed from the surrounding tissues. Next, a large skin ellipse was made around the aneurysmal cephalic vein the length of the entire forearm. The cephalic vein was ligated proximally and distally. The large aneurysm was excised including a skin ellipse.   There was significant bleeding from the subcutaneous tissue and this was controlled with electrocautery. Next a bovine pericardial graft was tunneled lateral to the excised ellipse. The patient was given heparin. The graft was sewn end-to-end to the cephalic vein fistula using a running 6-0 Prolene suture. It was then sewn end-to-end to the median cubital vein with running 6-0 Prolene suture. After completing the anastomosis the clamps were released and there was good flow through the new graft. The patient was given protamine. The skin subcutaneous tissue was approximated with interrupted 3-0 Vicryl sutures and the skin was closed with skin clips. Sterile dressings were applied to the incision. The neck and chest were prepped and draped in the usual sterile fashion. Next, 1% lidocaine mixed with 0.25% Marcaine was used for local anesthesia. The right internal jugular vein was percutaneously entered and a guidewire was advanced into the superior vena cava under fluoroscopic guidance. A small incision was made around the wire. The catheter was pulled through a subcutaneous tunnel from an inferior chest stab incision to the neck incision. The dilators were passed over the wire followed by the introducer. The catheter was passed through the introducer and the tip was positioned in the superior vena cava right atrial junction under fluoroscopic guidance. Both lumens were noted to withdrawal and flush blood easily and were flushed with heparinized saline solution. They catheter was secured to the skin with nylon sutures and the neck incision was approximated with an interrupted Vicryl suture. Sterile dressings were applied to the incisions in the operating room. Needle, sponge, and instrument counts were reported as correct times two. The patient tolerated the procedure and was transferred to the recovery area in satisfactory condition.        Electronically signed by Delisa Carroll MD on 11/2/2020 at 2:45 PM

## 2020-11-02 NOTE — PROGRESS NOTES
Patient given discharge instructions & verbalized understanding. Patient assisted with dressing & sling applied. No new drainage noted from tessio site. CXR reported. Patient's friend transporting home.

## 2020-11-02 NOTE — ANESTHESIA PROCEDURE NOTES
Peripheral Block    Patient location during procedure: pre-op  Start time: 11/2/2020 11:06 AM  End time: 11/2/2020 11:15 AM  Staffing  Performed: anesthesiologist   Preanesthetic Checklist  Completed: patient identified, site marked, surgical consent, pre-op evaluation, timeout performed, risks and benefits discussed, anesthesia consent given, oxygen available and patient being monitored  Peripheral Block  Patient position: left lateral decubitus  Block type: Brachial plexus  Laterality: right  Injection technique: single-shot  Procedures: ultrasound guided  Local infiltration: ropivacaine  Local infiltration: ropivacaine  Additional Notes  Decadron 4 mg imnjected with Ropivacaine  Medications Administered  Ropivacaine (NAROPIN) injection 0.5%, 25 mL  Reason for block: post-op pain management

## 2020-11-02 NOTE — H&P
Current Medications:     Current Facility-Administered Medications:     0.9 % sodium chloride infusion, , Intravenous, Continuous, Shashi Yoty, APRN - CNP    sodium chloride flush 0.9 % injection 10 mL, 10 mL, Intravenous, 2 times per day, Shashi Putty, APRN - CNP    sodium chloride flush 0.9 % injection 10 mL, 10 mL, Intravenous, PRN, Shashi Putty, APRN - CNP    ceFAZolin (ANCEF) 2 g in sterile water 20 mL IV syringe, 2 g, Intravenous, On Call to OR, Shashi Lezama APRN - CNP    HYDROmorphone (DILAUDID) injection 0.25 mg, 0.25 mg, Intravenous, Q5 Min PRN, Leta Polanco MD    HYDROmorphone (DILAUDID) injection 0.5 mg, 0.5 mg, Intravenous, Q5 Min PRN, Leta Polanco MD    promethazine (PHENERGAN) injection 6.25 mg, 6.25 mg, Intramuscular, Once PRN, Leta Polanco MD    Allergies:  Vancomycin    Social History     Socioeconomic History    Marital status: Single     Spouse name: Not on file    Number of children: Not on file    Years of education: Not on file    Highest education level: Not on file   Occupational History    Occupation: unemployed   Social Needs    Financial resource strain: Not on file    Food insecurity     Worry: Not on file     Inability: Not on file   [place] needs     Medical: Not on file     Non-medical: Not on file   Tobacco Use    Smoking status: Former Smoker     Packs/day: 1.00     Years: 20.00     Pack years: 20.00     Types: Cigarettes     Last attempt to quit: 2013     Years since quittin.9    Smokeless tobacco: Never Used   Substance and Sexual Activity    Alcohol use: No     Alcohol/week: 0.0 standard drinks     Comment: no caffeine    Drug use: Yes     Frequency: 7.0 times per week     Types: Marijuana     Comment: uses 3-4 times/day    Sexual activity: Yes     Partners: Female     Comment: occassional   Lifestyle    Physical activity     Days per week: Not on file     Minutes per session: Not on file    Stress: Not on file Relationships    Social connections     Talks on phone: Not on file     Gets together: Not on file     Attends Church service: Not on file     Active member of club or organization: Not on file     Attends meetings of clubs or organizations: Not on file     Relationship status: Not on file    Intimate partner violence     Fear of current or ex partner: Not on file     Emotionally abused: Not on file     Physically abused: Not on file     Forced sexual activity: Not on file   Other Topics Concern    Not on file   Social History Narrative    Denies caffeine. Family History   Problem Relation Age of Onset    Depression Maternal Grandmother     Cancer Maternal Grandmother     Depression Maternal Uncle     Cancer Maternal Uncle        REVIEW OF SYSTEMS:  The chart was reviewed.     PHYSICAL EXAM:    Vitals:    11/02/20 0939   BP: (!) 168/99   Pulse: 68   Resp: 20   Temp: 97.5 °F (36.4 °C)   SpO2: 97%     CONSTITUTIONAL:  awake, alert, cooperative, no apparent distress  LUNGS:  no increased work of breathing, good air exchange and clear to auscultation  CARDIOVASCULAR:  regular rate and rhythm and   ABDOMEN:  soft, non-distended and non-tender  R forearm AVF aneurysmal, thinned skin    LABS:    Lab Results   Component Value Date    WBC 3.3 (L) 11/02/2020    HGB 11.6 (L) 11/02/2020    HCT 37.2 11/02/2020     11/02/2020    PROTIME 14.3 (H) 11/02/2020    INR 1.3 11/02/2020    APTT 31.9 11/02/2020    K 4.1 11/02/2020    BUN 36 (H) 11/02/2020    CREATININE 9.4 (HH) 11/02/2020       RADIOLOGY:
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
## 2020-11-03 NOTE — TELEPHONE ENCOUNTER
----- Message from Aileen Anthony MD sent at 11/2/2020  2:55 PM EST -----  Please schedule patient for 2-week post-op office visit.

## 2020-11-17 NOTE — TELEPHONE ENCOUNTER
Notified Shellie at [facility] that AVG may be used starting Saturday, 11-21-20.

## 2020-11-17 NOTE — PROGRESS NOTES
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 The patient denies any unexpected problems since hospital discharge. He states that the catheter is functioning for dialysis. Procedure Laterality Date    ABDOMINAL AORTIC ANEURYSM REPAIR, ENDOVASCULAR N/A 6/22/2020    ABDOMINAL AORTIC ANEURYSM REPAIR ENDOVASCULAR performed by Tho Feliz MD at [address]      abdominal cyst    CYSTOSCOPY N/A 6/22/2020    CYSTOSCOPY of BLADDER TUMOR performed by Yamil Browne MD at [facility] 6/23/2020    CYSTOSCOPY, RETROGRADE PYELOGRAM, TRANSURETHRAL RESECTION BLADDER TUMOR, INSTILLATION OF MITOMYCIN-C performed by Kitty Andre MD at [address] Right 02/19/2016    Ashly LOVE    HC DIALYSIS CATHETER N/A 11/2/2020    CATHETER INSERTION  TUNNELED HEMODIALYSIS performed by Tho Feliz MD at [address] [facility] Floor Right 11/2/2020    AV SHUNT INSERTION REVISION performed by Tho Feliz MD at [address]       Physical Exam:  The incision(s) are healing without evidence of infection. Heart rhythm is regular. Fistula is patent with good thrill. Right      Left   Brachial     Radial     Femoral     Popliteal     Dorsalis Pedis     Posterior Tibial     (3=normal, 2=diminished, 1=barely palpable, 4=widened)    Problem List Items Addressed This Visit     None      Visit Diagnoses     Encounter regarding vascular access for dialysis for ESRD ([facility]    -  Primary        I reviewed with the patient that the graft can be accessed for hemodialysis. Hopefully we can remove the tunneled catheter soon. At the same time I would drain the left groin seroma.

## 2020-11-18 NOTE — TELEPHONE ENCOUNTER
Dr. Billy Gregorio patient had a post op visit with Dr. Mayuri Herrera, please review and advise if I can schedule his ICD implant.

## 2020-12-03 NOTE — TELEPHONE ENCOUNTER
Spoke with Saw Reddy at [facility] (she helps patient with his appointments), scheduled I & D left groin seroma on 12-9-20. Report to [facility] at 8:00 am. NPO after midnight except for heart and BP meds with sips of water.

## 2020-12-03 NOTE — TELEPHONE ENCOUNTER
I spoke to Rafi at Dr. Desirae Romerotingham office and she will call Beryle Delton ( [phone]) to schedule groin seroma drain. ICD cannot be scheduled until completely cleared by vascular.

## 2020-12-03 NOTE — PROGRESS NOTES
Ben[facility] PRE-ADMISSION TESTING GENERAL INSTRUCTIONS- [facility]-phone number:[phone]    GENERAL INSTRUCTIONS  [x] Antibacterial Soap shower Night before and/or AM of Surgery  [] Osmani wipe instruction sheet and wipes given. [x] Nothing by mouth after midnight, including gum, candy, mints, or water. [x] You may brush your teeth, gargle, but do NOT swallow water. []Hibiclens shower  the night before and the morning of surgery. Do not use             Hibiclens on your face or head. [x]No smoking, chewing tobacco, illegal drugs, or alcohol within 24 hours of your surgery. [x] Jewelry, valuables or body piercing's should not be brought to the hospital. All body and/or tongue piercing's must be removed prior to arriving to hospital.  ALL hair pins must be removed. [x] Do not wear makeup, lotions, powders, deodorant. Nail polish as directed by the nurse. [x] Arrange transportation with a responsible adult  to and from the hospital. If you do not have a responsible adult  to transport you, you will need to make arrangements with a medical transportation company (i.e. [employer]. A Uber/taxi/bus is not appropriate unless you are accompanied by a responsible adult ). Arrange for someone to be with you for the remainder of the day and for 24 hours after your procedure due to having had anesthesia. Who will be your  for transportation?___brother_______________   Who will be staying with you for 24 hrs after your procedure?_____brother_____________  [x] Bring insurance card and photo ID.  [] Transfusion Bracelet: Please bring with you to hospital, day of surgery  [] Bring urine specimen day of surgery. Any small container is acceptable. [] Use inhalers the morning of surgery and bring with you to hospital.  [] Bring copy of living will or healthcare power of  papers to be placed in your electronic record.   [] CPAP/BI-PAP: Please bring your machine if you are to spend the night in the hospital.     PARKING INSTRUCTIONS:   [x] Arrival Time:______0800_______  · [] Parking lot '\"I\"  is located on [facility] (the corner of [facility] and [facility]). To enter, press the button and the gate will lift. A free token will be provided to exit the lot. One car per patient is allowed to park in this lot. All other cars are to park on [address] [place] [address] either in the parking garage or the handicap lot. [] To reach the [facility] lobby from [address] [place] [address], upon entering the hospital, take elevator B to the 3rd floor. EDUCATION INSTRUCTIONS:      [] Knee or hip replacement booklet & exercise pamphlets given. [] Denisse [address] placed in chart. [] Pre-admission Testing educational folder given  [] Incentive Spirometry,coughing & deep breathing exercises reviewed. []Medication information sheet(s)   []Fluoroscopy-Xray used in surgery reviewed with patient. Educational pamphlet placed in chart. []Pain: Post-op pain is normal and to be expected. You will be asked to rate your pain from 0-10(a zero is not acceptable-education is needed). Your post-op pain goal is:  [] Ask your nurse for your pain medication. [] Joint camp offered. [] Joint replacement booklets given. [] Other:___________________________    MEDICATION INSTRUCTIONS:   [x]Bring a complete list of your medications, please write the last time you took the medicine, give this list to the nurse. [x] Take the following medications the morning of surgery with 1-2 ounces of water: see med sheet    [] Stop herbal supplements and vitamins 5 days before your surgery. [] DO NOT take any diabetic medicine the morning of surgery. Follow instructions for insulin the day before surgery. [] If you are diabetic and your blood sugar is low or you feel symptomatic, you may drink 1-2 ounces of apple juice or take a glucose tablet.   The morning of your procedure, you may call the pre-op area if you have concerns about your blood sugar [phone]. [] Use your inhalers the morning of surgery. Bring your emergency inhaler with you day of surgery. [] Follow physician instructions regarding any blood thinners you may be taking. WHAT TO EXPECT:  [x] The day of surgery you will be greeted and checked in by the [employer].  In addition, you will be registered in the [facility] by a Patient Access Representative. Please bring your photo ID and insurance card. A nurse will greet you in accordance to the time you are needed in the pre-op area to prepare you for surgery. Please do not be discouraged if you are not greeted in the order you arrive as there are many variables that are involved in patient preparation. Your patience is greatly appreciated as you wait for your nurse. Please bring in items such as: books, magazines, newspapers, electronics, or any other items  to occupy your time in the waiting area. []  Delays may occur with surgery and staff will make a sincere effort to keep you informed of delays. If any delays occur with your procedure, we apologize ahead of time for your inconvenience as we recognize the value of your time.

## 2020-12-09 PROBLEM — M96.843 POSTPROCEDURAL SEROMA OF A MUSCULOSKELETAL STRUCTURE FOLLOWING OTHER PROCEDURE: Status: ACTIVE | Noted: 2020-01-01

## 2020-12-09 NOTE — PROGRESS NOTES
Patient brought back to [facility]. Fluids given. Call light within reach.

## 2020-12-09 NOTE — H&P
Vascular Surgery Progress Note    CC: L groin mass      Subjective: Patient presents secondary to L groin mass after EVAR 6/22/2020. He has had left groin swelling since procedure that has not resolved. This mass has not had flow and is likely a seroma. He presents for drainage of seroma. Procedure Laterality Date    ABDOMINAL AORTIC ANEURYSM REPAIR, ENDOVASCULAR N/A 6/22/2020    ABDOMINAL AORTIC ANEURYSM REPAIR ENDOVASCULAR performed by Reji Gates MD at [address]      abdominal cyst    CYSTOSCOPY N/A 6/22/2020    CYSTOSCOPY of BLADDER TUMOR performed by Nadia Weiss MD at [facility] 6/23/2020    CYSTOSCOPY, RETROGRADE PYELOGRAM, TRANSURETHRAL RESECTION BLADDER TUMOR, INSTILLATION OF MITOMYCIN-C performed by Kristen Chaudhry MD at [address] [place] [address] Right 02/19/2016    Ashly LOVE    HC DIALYSIS CATHETER N/A 11/2/2020    CATHETER INSERTION  TUNNELED HEMODIALYSIS performed by Reji Gates MD at [address] [facility] Floor Right 11/2/2020    AV SHUNT INSERTION REVISION performed by Reji Gates MD at [address]       Physical Exam:    RUE: R AVF  LLE: Left groin mass          Right      Left   Brachial     Radial     Femoral     Popliteal     Dorsalis Pedis     Posterior Tibial     (3=normal, 2=diminished, 1=barely palpable, 4=widened)    Problem List Items Addressed This Visit     None      Visit Diagnoses     Pre-op testing    -  Primary    Relevant Orders    POCT Glucose        For I&D left groin seroma. The risks, benefits, alternatives, and potential complications of the procedure, including the risks of bleeding, infection, and injury to surrounding structures, were explained to the patient. All questions were answered. The patient understands and agrees to proceed with the procedure.      Electronically signed by Lorrie Steel MD on 12/9/2020 at 7:12 AM

## 2020-12-09 NOTE — ANESTHESIA POSTPROCEDURE EVALUATION
Department of Anesthesiology  Postprocedure Note    Patient: Malvin Reno  MRN: [id number]  YOB: 1965  Date of evaluation: 12/9/2020  Time:  2:38 PM     Procedure Summary     Date:  12/09/20 Room / Location:  [facility] OR 04 / [facility]    Anesthesia Start:  7304 Anesthesia Stop:  1010    Procedure:  INCISION AND DRAINAGE LEFT GROIN SEROMA (Left ) Diagnosis:  (LEFT GROIN SEROMA)    Surgeon:  Marcos Car MD Responsible Provider:  Jessika Hinojosa MD    Anesthesia Type:  MAC ASA Status:  4          Anesthesia Type: MAC    Radhames Phase I: Radhames Score: 10    Radhames Phase II: Radhames Score: 10    Last vitals: Reviewed and per EMR flowsheets.        Anesthesia Post Evaluation    Patient location during evaluation: PACU  Patient participation: complete - patient participated  Level of consciousness: awake  Airway patency: patent  Nausea & Vomiting: no vomiting and no nausea  Complications: no  Cardiovascular status: hemodynamically stable  Respiratory status: acceptable  Hydration status: stable
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
## 2020-12-09 NOTE — PROGRESS NOTES
[place] police here to lock up patient keys, cash, and cell phone. Clothes and shoes still with patient.

## 2020-12-09 NOTE — PROGRESS NOTES
Patient brought to same day recovery unit 1007 patent lethargic oxygen saturation 85-90, patient pulled up and sat up in cart by 2 RNS , patient suctions ed , patient continuously scratching at groin site redirected multiple times to not to touch, patient oxygen level as high as 91 when encouraging to stay awake and take deep breath , patient unable to keep eyes open and falls back to sleep oxygen level drops to 85  Suctions hooked up and patient suctioned PACU called to recover patient

## 2020-12-09 NOTE — PROGRESS NOTES
Patient given discharge instructions. Patient  verbalized understanding. No other questions at this time. Security brought patient's belongings back to patient, RN witness all of patient's belongings.

## 2020-12-09 NOTE — PROGRESS NOTES
Admitted to Same Day Surgery. Preop instructions given to patient. COVID testing completed on: 12/9/20 rapid COVID  No signs or symptoms expressed or observed.

## 2020-12-09 NOTE — OP NOTE
Operative Note      Patient: Osmin Orosco  YOB: 1965  MRN: [id number]    Date of Procedure: 12/9/2020    Pre-Op Diagnosis: LEFT GROIN SEROMA    Post-Op Diagnosis: Same       Procedure(s):  INCISION AND DRAINAGE LEFT GROIN SEROMA    Surgeon(s):  Aminah Jacques MD    Assistant:   Resident: Thuan Ovalles MD    Anesthesia: Monitor Anesthesia Care    Estimated Blood Loss (mL): less than 50     Complications: None    Specimens:   ID Type Source Tests Collected by Time Destination   A : left groin seroma cavity Tissue Tissue SURGICAL PATHOLOGY Aminah Jacques MD 12/9/2020 7379        Implants:  * No implants in log *      Drains: * No LDAs found *    Findings: Large left groin seroma    Detailed Description of Procedure:         HISTORY: Osmin Orosco is a 50 y.o. male who presents with left groin mass s/p EVAR. Incision and drainage of left groin seroma was recommended. The risks benefits and alternatives of the procedure were discussed with the patient who stated understanding and agreed to proceed. DESCRIPTION OF PROCEDURE: The patient was brought to the operating room and positioned supine on the OR table. Sequential compression devices were placed on the patient's lower extremities and functioning. Preoperative antibiotics were administered. Anesthesia was obtained without complication as per the anesthesia record. Immediately prior to the procedure a time-out was called and the surgical checklist was reviewed and agreed upon by all present. The patient was prepped and draped in the usual sterile fashion. Local anesthesia was injected and an incision was made with a scalpel. The seroma was entered and a large amount of serous fluid was expelled. The seroma cavity was inspected and a small hole that appeared to be the source of the serous fluid was identified and closed with a 4-0 prolene figure of 8 stitch.  Capsule was then excised using electrocautery and hemostasis was achieved with electrocautery and silk ties. The remain seroma capsule was scored with electrocautery and the cavity was closed in multiple layers using 2-0 and 3-0 vicryl sutures in interrupted fashion with the most superficial dermal layer close in running fashion. The skin was approximated with 4-0 monocryl sutures. Needle, sponge, and instrument counts were reported as correct x2. Dr. Kaley Shields was present and scrubbed throughout the case. The patient tolerated the procedure well without complications. He was transferred to the recovery area in good condition.     Electronically signed by Hernandez Mckeon MD on 12/9/20 at 10:09 AM EST

## 2020-12-15 NOTE — TELEPHONE ENCOUNTER
Dr. Caroline Justice would like to see Cat Perez for his post-op visit prior to clearing him to have ICD. Left message on patient's voicemail regarding the same. Also, left message at [facility] ([phone]) for Frannie Zapata to call back, as she helps coordinate his appts (she is not in today).

## 2020-12-29 PROBLEM — L76.34 POSTOPERATIVE SEROMA OF SUBCUTANEOUS TISSUE AFTER NON-DERMATOLOGIC PROCEDURE: Status: ACTIVE | Noted: 2020-01-01

## 2020-12-29 NOTE — PROGRESS NOTES
Vascular Surgery Progress Note    Chief Complaint   Patient presents with    Post-Op Check     AAA       Patient returns for post operative evaluation status post incision and drainage of left groin seroma. The patient states that he has developed recurrence in the area. Procedure Laterality Date    ABDOMINAL AORTIC ANEURYSM REPAIR, ENDOVASCULAR N/A 6/22/2020    ABDOMINAL AORTIC ANEURYSM REPAIR ENDOVASCULAR performed by Alda Donaldson MD at [address]      abdominal cyst    CYSTOSCOPY N/A 6/22/2020    CYSTOSCOPY of BLADDER TUMOR performed by Rayne Sorensen MD at [address] 6/23/2020    CYSTOSCOPY, RETROGRADE PYELOGRAM, TRANSURETHRAL RESECTION BLADDER TUMOR, INSTILLATION OF MITOMYCIN-C performed by Carlitos Sorensen MD at [address] 02/19/2016    RC, Ashly    HC DIALYSIS CATHETER N/A 11/2/2020    CATHETER INSERTION  TUNNELED HEMODIALYSIS performed by Alda Donaldson MD at [address] 12/9/2020    INCISION AND DRAINAGE LEFT GROIN SEROMA performed by Alda Donaldson MD at [address] [facility] Right 11/2/2020    AV SHUNT INSERTION REVISION performed by Alda Donaldson MD at [address]       Physical Exam:  The incision(s) are healing without evidence of infection. Heart rhythm is regular. Swelling consistent with seroma in the left groin. Softer than the previous seroma.           Right      Left   Brachial     Radial     Femoral     Popliteal     Dorsalis Pedis     Posterior Tibial     (3=normal, 2=diminished, 1=barely palpable, 4=widened)    Problem List Items Addressed This Visit     Postoperative seroma of subcutaneous tissue after non-dermatologic procedure - Primary

## 2020-12-30 NOTE — TELEPHONE ENCOUNTER
Clearance received from Dr. Cornelius Petit for ICD implant. I tried calling Ruslan Coburn ( for [employer]) she is currently on vacation. I will try and reach her on Monday.

## 2021-01-01 ENCOUNTER — OFFICE VISIT (OUTPATIENT)
Dept: SURGERY | Age: 56
End: 2021-01-01
Payer: COMMERCIAL

## 2021-01-01 ENCOUNTER — OFFICE VISIT (OUTPATIENT)
Dept: CARDIOLOGY CLINIC | Age: 56
End: 2021-01-01
Payer: COMMERCIAL

## 2021-01-01 ENCOUNTER — OFFICE VISIT (OUTPATIENT)
Dept: VASCULAR SURGERY | Age: 56
End: 2021-01-01
Payer: COMMERCIAL

## 2021-01-01 ENCOUNTER — TELEPHONE (OUTPATIENT)
Dept: CARDIOLOGY CLINIC | Age: 56
End: 2021-01-01

## 2021-01-01 ENCOUNTER — HOSPITAL ENCOUNTER (OUTPATIENT)
Age: 56
Setting detail: OUTPATIENT SURGERY
Discharge: HOME | End: 2021-07-30
Attending: SURGERY | Admitting: SURGERY
Payer: COMMERCIAL

## 2021-01-01 ENCOUNTER — TELEPHONE (OUTPATIENT)
Dept: CARDIAC REHAB | Age: 56
End: 2021-01-01

## 2021-01-01 ENCOUNTER — TELEPHONE (OUTPATIENT)
Dept: INTERNAL MEDICINE | Age: 56
End: 2021-01-01

## 2021-01-01 ENCOUNTER — APPOINTMENT (OUTPATIENT)
Dept: GENERAL RADIOLOGY | Age: 56
DRG: 342 | End: 2021-01-01
Payer: COMMERCIAL

## 2021-01-01 ENCOUNTER — TELEPHONE (OUTPATIENT)
Dept: SURGERY | Age: 56
End: 2021-01-01

## 2021-01-01 ENCOUNTER — HOSPITAL ENCOUNTER (EMERGENCY)
Age: 56
Discharge: HOME | End: 2021-02-15
Payer: COMMERCIAL

## 2021-01-01 ENCOUNTER — ANESTHESIA (OUTPATIENT)
Dept: ENDOSCOPY | Age: 56
End: 2021-01-01
Payer: COMMERCIAL

## 2021-01-01 ENCOUNTER — HOSPITAL ENCOUNTER (INPATIENT)
Age: 56
LOS: 6 days | DRG: 342 | End: 2021-08-13
Attending: EMERGENCY MEDICINE | Admitting: STUDENT IN AN ORGANIZED HEALTH CARE EDUCATION/TRAINING PROGRAM
Payer: COMMERCIAL

## 2021-01-01 ENCOUNTER — TELEPHONE (OUTPATIENT)
Dept: VASCULAR SURGERY | Age: 56
End: 2021-01-01

## 2021-01-01 ENCOUNTER — APPOINTMENT (OUTPATIENT)
Dept: GENERAL RADIOLOGY | Age: 56
DRG: 194 | End: 2021-01-01
Payer: COMMERCIAL

## 2021-01-01 ENCOUNTER — APPOINTMENT (OUTPATIENT)
Dept: ULTRASOUND IMAGING | Age: 56
DRG: 342 | End: 2021-01-01
Payer: COMMERCIAL

## 2021-01-01 ENCOUNTER — TELEPHONE (OUTPATIENT)
Dept: NON INVASIVE DIAGNOSTICS | Age: 56
End: 2021-01-01

## 2021-01-01 ENCOUNTER — OFFICE VISIT (OUTPATIENT)
Dept: NON INVASIVE DIAGNOSTICS | Age: 56
End: 2021-01-01
Payer: COMMERCIAL

## 2021-01-01 ENCOUNTER — APPOINTMENT (OUTPATIENT)
Dept: GENERAL RADIOLOGY | Age: 56
End: 2021-01-01
Payer: COMMERCIAL

## 2021-01-01 ENCOUNTER — APPOINTMENT (OUTPATIENT)
Dept: CT IMAGING | Age: 56
End: 2021-01-01
Payer: COMMERCIAL

## 2021-01-01 ENCOUNTER — PREP FOR PROCEDURE (OUTPATIENT)
Dept: SURGERY | Age: 56
End: 2021-01-01

## 2021-01-01 ENCOUNTER — TELEPHONE (OUTPATIENT)
Dept: OTHER | Facility: CLINIC | Age: 56
End: 2021-01-01

## 2021-01-01 ENCOUNTER — APPOINTMENT (OUTPATIENT)
Dept: CT IMAGING | Age: 56
DRG: 342 | End: 2021-01-01
Payer: COMMERCIAL

## 2021-01-01 ENCOUNTER — HOSPITAL ENCOUNTER (EMERGENCY)
Age: 56
Discharge: HOME | End: 2021-01-15
Attending: EMERGENCY MEDICINE
Payer: COMMERCIAL

## 2021-01-01 ENCOUNTER — HOSPITAL ENCOUNTER (INPATIENT)
Age: 56
LOS: 4 days | Discharge: HOME | DRG: 194 | End: 2021-02-01
Attending: EMERGENCY MEDICINE | Admitting: FAMILY MEDICINE
Payer: COMMERCIAL

## 2021-01-01 ENCOUNTER — HOSPITAL ENCOUNTER (EMERGENCY)
Age: 56
Discharge: HOME | End: 2021-01-21
Payer: COMMERCIAL

## 2021-01-01 ENCOUNTER — ANESTHESIA EVENT (OUTPATIENT)
Dept: ENDOSCOPY | Age: 56
End: 2021-01-01
Payer: COMMERCIAL

## 2021-01-01 VITALS
HEIGHT: 66 IN | SYSTOLIC BLOOD PRESSURE: 140 MMHG | HEART RATE: 92 BPM | DIASTOLIC BLOOD PRESSURE: 96 MMHG | BODY MASS INDEX: 31.82 KG/M2 | RESPIRATION RATE: 16 BRPM | WEIGHT: 198 LBS | OXYGEN SATURATION: 98 %

## 2021-01-01 VITALS
HEART RATE: 75 BPM | BODY MASS INDEX: 27.32 KG/M2 | HEIGHT: 66 IN | RESPIRATION RATE: 16 BRPM | WEIGHT: 170 LBS | DIASTOLIC BLOOD PRESSURE: 104 MMHG | OXYGEN SATURATION: 96 % | SYSTOLIC BLOOD PRESSURE: 169 MMHG | TEMPERATURE: 97.3 F

## 2021-01-01 VITALS
TEMPERATURE: 95.7 F | BODY MASS INDEX: 36.32 KG/M2 | RESPIRATION RATE: 25 BRPM | DIASTOLIC BLOOD PRESSURE: 50 MMHG | SYSTOLIC BLOOD PRESSURE: 103 MMHG | OXYGEN SATURATION: 94 % | HEIGHT: 66 IN | HEART RATE: 70 BPM | WEIGHT: 226 LBS

## 2021-01-01 VITALS
BODY MASS INDEX: 31.57 KG/M2 | SYSTOLIC BLOOD PRESSURE: 121 MMHG | HEART RATE: 91 BPM | DIASTOLIC BLOOD PRESSURE: 69 MMHG | WEIGHT: 196.43 LBS | HEIGHT: 66 IN | OXYGEN SATURATION: 97 % | TEMPERATURE: 98.3 F | RESPIRATION RATE: 20 BRPM

## 2021-01-01 VITALS — HEIGHT: 66 IN | BODY MASS INDEX: 24.11 KG/M2 | WEIGHT: 150 LBS

## 2021-01-01 VITALS
OXYGEN SATURATION: 98 % | TEMPERATURE: 96.4 F | HEART RATE: 72 BPM | SYSTOLIC BLOOD PRESSURE: 138 MMHG | RESPIRATION RATE: 20 BRPM | DIASTOLIC BLOOD PRESSURE: 82 MMHG

## 2021-01-01 VITALS
BODY MASS INDEX: 32.95 KG/M2 | TEMPERATURE: 99 F | OXYGEN SATURATION: 96 % | HEART RATE: 102 BPM | SYSTOLIC BLOOD PRESSURE: 154 MMHG | WEIGHT: 205 LBS | RESPIRATION RATE: 16 BRPM | HEIGHT: 66 IN | DIASTOLIC BLOOD PRESSURE: 89 MMHG

## 2021-01-01 VITALS
SYSTOLIC BLOOD PRESSURE: 133 MMHG | HEIGHT: 66 IN | TEMPERATURE: 97.7 F | DIASTOLIC BLOOD PRESSURE: 86 MMHG | RESPIRATION RATE: 16 BRPM | OXYGEN SATURATION: 100 % | HEART RATE: 94 BPM | WEIGHT: 190 LBS | BODY MASS INDEX: 30.53 KG/M2

## 2021-01-01 VITALS
SYSTOLIC BLOOD PRESSURE: 142 MMHG | HEIGHT: 66 IN | OXYGEN SATURATION: 98 % | HEART RATE: 93 BPM | BODY MASS INDEX: 30.73 KG/M2 | DIASTOLIC BLOOD PRESSURE: 100 MMHG | WEIGHT: 191.2 LBS

## 2021-01-01 VITALS
RESPIRATION RATE: 18 BRPM | DIASTOLIC BLOOD PRESSURE: 98 MMHG | WEIGHT: 204 LBS | BODY MASS INDEX: 32.78 KG/M2 | SYSTOLIC BLOOD PRESSURE: 164 MMHG | HEIGHT: 66 IN

## 2021-01-01 VITALS — BODY MASS INDEX: 25.71 KG/M2 | WEIGHT: 160 LBS | HEIGHT: 66 IN

## 2021-01-01 VITALS
SYSTOLIC BLOOD PRESSURE: 169 MMHG | TEMPERATURE: 97.5 F | DIASTOLIC BLOOD PRESSURE: 87 MMHG | OXYGEN SATURATION: 97 % | HEIGHT: 66 IN | BODY MASS INDEX: 29.73 KG/M2 | WEIGHT: 185 LBS | HEART RATE: 77 BPM | RESPIRATION RATE: 16 BRPM

## 2021-01-01 VITALS
OXYGEN SATURATION: 93 % | SYSTOLIC BLOOD PRESSURE: 112 MMHG | RESPIRATION RATE: 16 BRPM | DIASTOLIC BLOOD PRESSURE: 78 MMHG

## 2021-01-01 DIAGNOSIS — Z12.11 SCREENING FOR COLORECTAL CANCER: ICD-10-CM

## 2021-01-01 DIAGNOSIS — N18.6 ESRD NEEDING DIALYSIS (HCC): Chronic | ICD-10-CM

## 2021-01-01 DIAGNOSIS — I42.8 NONISCHEMIC CARDIOMYOPATHY (HCC): ICD-10-CM

## 2021-01-01 DIAGNOSIS — Z99.2 ESRD NEEDING DIALYSIS (HCC): Chronic | ICD-10-CM

## 2021-01-01 DIAGNOSIS — I48.91 ATRIAL FIBRILLATION, UNSPECIFIED TYPE (HCC): Primary | ICD-10-CM

## 2021-01-01 DIAGNOSIS — I42.8 NONISCHEMIC CARDIOMYOPATHY (HCC): Primary | Chronic | ICD-10-CM

## 2021-01-01 DIAGNOSIS — K40.90 INGUINAL HERNIA OF RIGHT SIDE WITHOUT OBSTRUCTION OR GANGRENE: Primary | ICD-10-CM

## 2021-01-01 DIAGNOSIS — I50.9 ACUTE ON CHRONIC CONGESTIVE HEART FAILURE, UNSPECIFIED HEART FAILURE TYPE (HCC): ICD-10-CM

## 2021-01-01 DIAGNOSIS — N18.6 ESRD ON DIALYSIS (HCC): Primary | ICD-10-CM

## 2021-01-01 DIAGNOSIS — I42.8 NONISCHEMIC CARDIOMYOPATHY (HCC): Chronic | ICD-10-CM

## 2021-01-01 DIAGNOSIS — E87.6 HYPOKALEMIA: ICD-10-CM

## 2021-01-01 DIAGNOSIS — R04.0 EPISTAXIS: Primary | ICD-10-CM

## 2021-01-01 DIAGNOSIS — Z12.12 SCREENING FOR COLORECTAL CANCER: ICD-10-CM

## 2021-01-01 DIAGNOSIS — Z99.2 ESRD ON HEMODIALYSIS (HCC): Primary | ICD-10-CM

## 2021-01-01 DIAGNOSIS — Z99.2 ESRD ON DIALYSIS (HCC): Primary | ICD-10-CM

## 2021-01-01 DIAGNOSIS — R94.31 ABNORMAL EKG: ICD-10-CM

## 2021-01-01 DIAGNOSIS — Z01.818 PRE-OP TESTING: Primary | ICD-10-CM

## 2021-01-01 DIAGNOSIS — I10 ESSENTIAL HYPERTENSION: ICD-10-CM

## 2021-01-01 DIAGNOSIS — Z95.810 HISTORY OF IMPLANTABLE CARDIOVERTER-DEFIBRILLATOR (ICD) PLACEMENT: ICD-10-CM

## 2021-01-01 DIAGNOSIS — N50.89 SCROTAL EDEMA: ICD-10-CM

## 2021-01-01 DIAGNOSIS — Z76.0 ENCOUNTER FOR MEDICATION REFILL: ICD-10-CM

## 2021-01-01 DIAGNOSIS — I50.20 HFREF (HEART FAILURE WITH REDUCED EJECTION FRACTION) (HCC): Primary | ICD-10-CM

## 2021-01-01 DIAGNOSIS — K40.90 RIGHT INGUINAL HERNIA: Primary | ICD-10-CM

## 2021-01-01 DIAGNOSIS — Z99.2 ESRD ON HEMODIALYSIS (HCC): ICD-10-CM

## 2021-01-01 DIAGNOSIS — G47.33 OSA (OBSTRUCTIVE SLEEP APNEA): ICD-10-CM

## 2021-01-01 DIAGNOSIS — I42.8 NONISCHEMIC CARDIOMYOPATHY (HCC): Primary | ICD-10-CM

## 2021-01-01 DIAGNOSIS — K40.90 NON-RECURRENT UNILATERAL INGUINAL HERNIA WITHOUT OBSTRUCTION OR GANGRENE: Primary | ICD-10-CM

## 2021-01-01 DIAGNOSIS — I50.22 CHRONIC SYSTOLIC HEART FAILURE (HCC): ICD-10-CM

## 2021-01-01 DIAGNOSIS — I48.91 ATRIAL FIBRILLATION, UNSPECIFIED TYPE (HCC): Chronic | ICD-10-CM

## 2021-01-01 DIAGNOSIS — E87.79 OTHER HYPERVOLEMIA: Primary | ICD-10-CM

## 2021-01-01 DIAGNOSIS — S82.891A CLOSED FRACTURE OF RIGHT ANKLE, INITIAL ENCOUNTER: ICD-10-CM

## 2021-01-01 DIAGNOSIS — N18.6 ESRD ON HEMODIALYSIS (HCC): Primary | ICD-10-CM

## 2021-01-01 DIAGNOSIS — I10 HYPERTENSION, UNSPECIFIED TYPE: ICD-10-CM

## 2021-01-01 DIAGNOSIS — I50.22 CHRONIC HFREF (HEART FAILURE WITH REDUCED EJECTION FRACTION) (HCC): Primary | ICD-10-CM

## 2021-01-01 DIAGNOSIS — N18.6 ESRD ON HEMODIALYSIS (HCC): ICD-10-CM

## 2021-01-01 DIAGNOSIS — Z01.812 PRE-OPERATIVE LABORATORY EXAMINATION: Primary | ICD-10-CM

## 2021-01-01 DIAGNOSIS — I10 ESSENTIAL HYPERTENSION: Chronic | ICD-10-CM

## 2021-01-01 DIAGNOSIS — N18.6 ESRD (END STAGE RENAL DISEASE) (HCC): Primary | ICD-10-CM

## 2021-01-01 DIAGNOSIS — I89.0 LYMPHEDEMA: ICD-10-CM

## 2021-01-01 DIAGNOSIS — I47.29 NSVT (NONSUSTAINED VENTRICULAR TACHYCARDIA): ICD-10-CM

## 2021-01-01 LAB
AADO2: 270.6 MMHG
AADO2: 282.3 MMHG
AADO2: 283.5 MMHG
AADO2: 319.7 MMHG
AADO2: 326.5 MMHG
AADO2: 339.7 MMHG
AADO2: 365.3 MMHG
AADO2: 520.1 MMHG
ABO/RH: NORMAL
ABO/RH: NORMAL
ADENOVIRUS BY PCR: NOT DETECTED
ALBUMIN SERPL-MCNC: 2 G/DL (ref 3.5–5.2)
ALBUMIN SERPL-MCNC: 2.1 G/DL (ref 3.5–5.2)
ALBUMIN SERPL-MCNC: 2.2 G/DL (ref 3.5–5.2)
ALBUMIN SERPL-MCNC: 2.2 G/DL (ref 3.5–5.2)
ALBUMIN SERPL-MCNC: 2.3 G/DL (ref 3.5–5.2)
ALBUMIN SERPL-MCNC: 2.4 G/DL (ref 3.5–5.2)
ALBUMIN SERPL-MCNC: 2.5 G/DL (ref 3.5–5.2)
ALBUMIN SERPL-MCNC: 2.6 G/DL (ref 3.5–5.2)
ALBUMIN SERPL-MCNC: 3.7 G/DL (ref 3.5–5.2)
ALP BLD-CCNC: 109 U/L (ref 40–129)
ALP BLD-CCNC: 118 U/L (ref 40–129)
ALP BLD-CCNC: 121 U/L (ref 40–129)
ALP BLD-CCNC: 156 U/L (ref 40–129)
ALP BLD-CCNC: 60 U/L (ref 40–129)
ALP BLD-CCNC: 68 U/L (ref 40–129)
ALP BLD-CCNC: 69 U/L (ref 40–129)
ALP BLD-CCNC: 78 U/L (ref 40–129)
ALP BLD-CCNC: 84 U/L (ref 40–129)
ALT SERPL-CCNC: 10 U/L (ref 0–40)
ALT SERPL-CCNC: 10 U/L (ref 0–40)
ALT SERPL-CCNC: 11 U/L (ref 0–40)
ALT SERPL-CCNC: 12 U/L (ref 0–40)
ALT SERPL-CCNC: 5 U/L (ref 0–40)
ALT SERPL-CCNC: 7 U/L (ref 0–40)
ALT SERPL-CCNC: 70 U/L (ref 0–40)
AMMONIA: 35 UMOL/L (ref 16–60)
ANION GAP SERPL CALCULATED.3IONS-SCNC: 10 MMOL/L (ref 7–16)
ANION GAP SERPL CALCULATED.3IONS-SCNC: 11 MMOL/L (ref 7–16)
ANION GAP SERPL CALCULATED.3IONS-SCNC: 13 MMOL/L (ref 7–16)
ANION GAP SERPL CALCULATED.3IONS-SCNC: 13 MMOL/L (ref 7–16)
ANION GAP SERPL CALCULATED.3IONS-SCNC: 14 MMOL/L (ref 7–16)
ANION GAP SERPL CALCULATED.3IONS-SCNC: 15 MMOL/L (ref 7–16)
ANION GAP SERPL CALCULATED.3IONS-SCNC: 15 MMOL/L (ref 7–16)
ANION GAP SERPL CALCULATED.3IONS-SCNC: 16 MMOL/L (ref 7–16)
ANION GAP SERPL CALCULATED.3IONS-SCNC: 17 MMOL/L (ref 7–16)
ANION GAP SERPL CALCULATED.3IONS-SCNC: 18 MMOL/L (ref 7–16)
ANION GAP SERPL CALCULATED.3IONS-SCNC: 21 MMOL/L (ref 7–16)
ANION GAP SERPL CALCULATED.3IONS-SCNC: 34 MMOL/L (ref 7–16)
ANION GAP SERPL CALCULATED.3IONS-SCNC: 42 MMOL/L (ref 7–16)
ANION GAP SERPL CALCULATED.3IONS-SCNC: 44 MMOL/L (ref 7–16)
ANISOCYTOSIS: ABNORMAL
ANTIBODY SCREEN: NORMAL
APTT: 28 SEC (ref 24.5–35.1)
APTT: 32.8 SEC (ref 24.5–35.1)
APTT: 34 SEC (ref 24.5–35.1)
APTT: 35.1 SEC (ref 24.5–35.1)
APTT: 35.6 SEC (ref 24.5–35.1)
APTT: 37 SEC (ref 24.5–35.1)
APTT: 38.9 SEC (ref 24.5–35.1)
APTT: 59.5 SEC (ref 24.5–35.1)
AST SERPL-CCNC: 12 U/L (ref 0–39)
AST SERPL-CCNC: 15 U/L (ref 0–39)
AST SERPL-CCNC: 20 U/L (ref 0–39)
AST SERPL-CCNC: 22 U/L (ref 0–39)
AST SERPL-CCNC: 26 U/L (ref 0–39)
AST SERPL-CCNC: 41 U/L (ref 0–39)
AST SERPL-CCNC: 43 U/L (ref 0–39)
AST SERPL-CCNC: 44 U/L (ref 0–39)
AST SERPL-CCNC: 621 U/L (ref 0–39)
ATYPICAL LYMPHOCYTE RELATIVE PERCENT: 0.9 % (ref 0–4)
ATYPICAL LYMPHOCYTE RELATIVE PERCENT: 0.9 % (ref 0–4)
B.E.: -17 MMOL/L (ref -3–3)
B.E.: -18.5 MMOL/L (ref -3–3)
B.E.: -20.2 MMOL/L (ref -3–3)
B.E.: -20.2 MMOL/L (ref -3–3)
B.E.: -3.7 MMOL/L (ref -3–3)
B.E.: -4.1 MMOL/L (ref -3–3)
B.E.: 0.2 MMOL/L (ref -3–3)
B.E.: 3.8 MMOL/L (ref -3–3)
B.E.: 5.2 MMOL/L (ref -3–3)
BASOPHILIC STIPPLING: ABNORMAL
BASOPHILS ABSOLUTE: 0 E9/L (ref 0–0.2)
BASOPHILS ABSOLUTE: 0.03 E9/L (ref 0–0.2)
BASOPHILS ABSOLUTE: 0.04 E9/L (ref 0–0.2)
BASOPHILS ABSOLUTE: 0.04 E9/L (ref 0–0.2)
BASOPHILS ABSOLUTE: 0.05 E9/L (ref 0–0.2)
BASOPHILS ABSOLUTE: 0.06 E9/L (ref 0–0.2)
BASOPHILS ABSOLUTE: 0.07 E9/L (ref 0–0.2)
BASOPHILS ABSOLUTE: 0.08 E9/L (ref 0–0.2)
BASOPHILS RELATIVE PERCENT: 0.1 % (ref 0–2)
BASOPHILS RELATIVE PERCENT: 0.2 % (ref 0–2)
BASOPHILS RELATIVE PERCENT: 0.3 % (ref 0–2)
BASOPHILS RELATIVE PERCENT: 0.5 % (ref 0–2)
BASOPHILS RELATIVE PERCENT: 0.5 % (ref 0–2)
BASOPHILS RELATIVE PERCENT: 0.7 % (ref 0–2)
BASOPHILS RELATIVE PERCENT: 0.8 % (ref 0–2)
BASOPHILS RELATIVE PERCENT: 0.9 % (ref 0–2)
BASOPHILS RELATIVE PERCENT: 1.1 % (ref 0–2)
BASOPHILS RELATIVE PERCENT: 1.1 % (ref 0–2)
BASOPHILS RELATIVE PERCENT: 1.5 % (ref 0–2)
BILIRUB SERPL-MCNC: 0.8 MG/DL (ref 0–1.2)
BILIRUB SERPL-MCNC: 2.3 MG/DL (ref 0–1.2)
BILIRUB SERPL-MCNC: 2.5 MG/DL (ref 0–1.2)
BILIRUB SERPL-MCNC: 3.6 MG/DL (ref 0–1.2)
BILIRUB SERPL-MCNC: 4 MG/DL (ref 0–1.2)
BILIRUB SERPL-MCNC: 5.6 MG/DL (ref 0–1.2)
BILIRUB SERPL-MCNC: 7.6 MG/DL (ref 0–1.2)
BILIRUB SERPL-MCNC: 8.2 MG/DL (ref 0–1.2)
BILIRUB SERPL-MCNC: 9.5 MG/DL (ref 0–1.2)
BILIRUBIN DIRECT: 1.7 MG/DL (ref 0–0.3)
BILIRUBIN DIRECT: 3.4 MG/DL (ref 0–0.3)
BILIRUBIN, INDIRECT: 0.6 MG/DL (ref 0–1)
BILIRUBIN, INDIRECT: 0.6 MG/DL (ref 0–1)
BLOOD BANK DISPENSE STATUS: NORMAL
BLOOD BANK PRODUCT CODE: NORMAL
BORDETELLA PARAPERTUSSIS BY PCR: NOT DETECTED
BORDETELLA PERTUSSIS BY PCR: NOT DETECTED
BOTTLE TYPE: ABNORMAL
BPU ID: NORMAL
BUN BLDV-MCNC: 17 MG/DL (ref 6–20)
BUN BLDV-MCNC: 19 MG/DL (ref 6–20)
BUN BLDV-MCNC: 22 MG/DL (ref 6–20)
BUN BLDV-MCNC: 24 MG/DL (ref 6–20)
BUN BLDV-MCNC: 26 MG/DL (ref 6–20)
BUN BLDV-MCNC: 26 MG/DL (ref 6–20)
BUN BLDV-MCNC: 27 MG/DL (ref 6–20)
BUN BLDV-MCNC: 27 MG/DL (ref 6–20)
BUN BLDV-MCNC: 29 MG/DL (ref 6–20)
BUN BLDV-MCNC: 30 MG/DL (ref 6–20)
BUN BLDV-MCNC: 31 MG/DL (ref 6–20)
BUN BLDV-MCNC: 31 MG/DL (ref 6–20)
BUN BLDV-MCNC: 33 MG/DL (ref 6–20)
BUN BLDV-MCNC: 35 MG/DL (ref 6–20)
BUN BLDV-MCNC: 35 MG/DL (ref 6–20)
BUN BLDV-MCNC: 42 MG/DL (ref 6–20)
BUN BLDV-MCNC: 42 MG/DL (ref 6–20)
BUN BLDV-MCNC: 62 MG/DL (ref 6–20)
BURR CELLS: ABNORMAL
C-REACTIVE PROTEIN: 23.9 MG/DL (ref 0–0.4)
CALCIUM IONIZED: 0.34 MMOL/L (ref 1.15–1.33)
CALCIUM IONIZED: 0.34 MMOL/L (ref 1.15–1.33)
CALCIUM IONIZED: 0.39 MMOL/L (ref 1.15–1.33)
CALCIUM IONIZED: 0.81 MMOL/L (ref 1.15–1.33)
CALCIUM IONIZED: 0.82 MMOL/L (ref 1.15–1.33)
CALCIUM IONIZED: 0.9 MMOL/L (ref 1.15–1.33)
CALCIUM IONIZED: 0.95 MMOL/L (ref 1.15–1.33)
CALCIUM IONIZED: 1.13 MMOL/L (ref 1.15–1.33)
CALCIUM SERPL-MCNC: 7.9 MG/DL (ref 8.6–10.2)
CALCIUM SERPL-MCNC: 8 MG/DL (ref 8.6–10.2)
CALCIUM SERPL-MCNC: 8.1 MG/DL (ref 8.6–10.2)
CALCIUM SERPL-MCNC: 8.1 MG/DL (ref 8.6–10.2)
CALCIUM SERPL-MCNC: 8.2 MG/DL (ref 8.6–10.2)
CALCIUM SERPL-MCNC: 8.4 MG/DL (ref 8.6–10.2)
CALCIUM SERPL-MCNC: 8.4 MG/DL (ref 8.6–10.2)
CALCIUM SERPL-MCNC: 8.5 MG/DL (ref 8.6–10.2)
CALCIUM SERPL-MCNC: 8.6 MG/DL (ref 8.6–10.2)
CALCIUM SERPL-MCNC: 8.8 MG/DL (ref 8.6–10.2)
CALCIUM SERPL-MCNC: 8.8 MG/DL (ref 8.6–10.2)
CALCIUM SERPL-MCNC: 9.1 MG/DL (ref 8.6–10.2)
CALCIUM SERPL-MCNC: 9.2 MG/DL (ref 8.6–10.2)
CALCIUM SERPL-MCNC: 9.6 MG/DL (ref 8.6–10.2)
CANDIDA ALBICANS BY PCR: NOT DETECTED
CANDIDA GLABRATA BY PCR: NOT DETECTED
CANDIDA KRUSEI BY PCR: NOT DETECTED
CANDIDA PARAPSILOSIS BY PCR: NOT DETECTED
CANDIDA TROPICALIS BY PCR: NOT DETECTED
CHLAMYDOPHILIA PNEUMONIAE BY PCR: NOT DETECTED
CHLORIDE BLD-SCNC: 85 MMOL/L (ref 98–107)
CHLORIDE BLD-SCNC: 85 MMOL/L (ref 98–107)
CHLORIDE BLD-SCNC: 88 MMOL/L (ref 98–107)
CHLORIDE BLD-SCNC: 90 MMOL/L (ref 98–107)
CHLORIDE BLD-SCNC: 91 MMOL/L (ref 98–107)
CHLORIDE BLD-SCNC: 93 MMOL/L (ref 98–107)
CHLORIDE BLD-SCNC: 94 MMOL/L (ref 98–107)
CHLORIDE BLD-SCNC: 94 MMOL/L (ref 98–107)
CHLORIDE BLD-SCNC: 95 MMOL/L (ref 98–107)
CHLORIDE BLD-SCNC: 96 MMOL/L (ref 98–107)
CHLORIDE BLD-SCNC: 97 MMOL/L (ref 98–107)
CHLORIDE BLD-SCNC: 98 MMOL/L (ref 98–107)
CHOLESTEROL, TOTAL: 95 MG/DL (ref 0–199)
CK MB: 3.5 NG/ML (ref 0–7.7)
CO2: 12 MMOL/L (ref 22–29)
CO2: 21 MMOL/L (ref 22–29)
CO2: 23 MMOL/L (ref 22–29)
CO2: 23 MMOL/L (ref 22–29)
CO2: 25 MMOL/L (ref 22–29)
CO2: 27 MMOL/L (ref 22–29)
CO2: 28 MMOL/L (ref 22–29)
CO2: 28 MMOL/L (ref 22–29)
CO2: 29 MMOL/L (ref 22–29)
CO2: 31 MMOL/L (ref 22–29)
CO2: 32 MMOL/L (ref 22–29)
CO2: 33 MMOL/L (ref 22–29)
CO2: 36 MMOL/L (ref 22–29)
CO2: 8 MMOL/L (ref 22–29)
CO2: 9 MMOL/L (ref 22–29)
COHB: 0.9 % (ref 0–1.5)
COHB: 1.1 % (ref 0–1.5)
COHB: 1.2 % (ref 0–1.5)
COHB: 1.3 % (ref 0–1.5)
COHB: 1.6 % (ref 0–1.5)
COHB: 1.7 % (ref 0–1.5)
COHB: 2 % (ref 0–1.5)
CORONAVIRUS 229E BY PCR: NOT DETECTED
CORONAVIRUS HKU1 BY PCR: NOT DETECTED
CORONAVIRUS NL63 BY PCR: NOT DETECTED
CORONAVIRUS OC43 BY PCR: NOT DETECTED
CREAT SERPL-MCNC: 2.5 MG/DL (ref 0.7–1.2)
CREAT SERPL-MCNC: 2.6 MG/DL (ref 0.7–1.2)
CREAT SERPL-MCNC: 3.3 MG/DL (ref 0.7–1.2)
CREAT SERPL-MCNC: 3.6 MG/DL (ref 0.7–1.2)
CREAT SERPL-MCNC: 3.8 MG/DL (ref 0.7–1.2)
CREAT SERPL-MCNC: 3.8 MG/DL (ref 0.7–1.2)
CREAT SERPL-MCNC: 3.9 MG/DL (ref 0.7–1.2)
CREAT SERPL-MCNC: 4 MG/DL (ref 0.7–1.2)
CREAT SERPL-MCNC: 4.1 MG/DL (ref 0.7–1.2)
CREAT SERPL-MCNC: 4.4 MG/DL (ref 0.7–1.2)
CREAT SERPL-MCNC: 4.6 MG/DL (ref 0.7–1.2)
CREAT SERPL-MCNC: 4.7 MG/DL (ref 0.7–1.2)
CREAT SERPL-MCNC: 4.9 MG/DL (ref 0.7–1.2)
CREAT SERPL-MCNC: 5.4 MG/DL (ref 0.7–1.2)
CREAT SERPL-MCNC: 5.7 MG/DL (ref 0.7–1.2)
CREAT SERPL-MCNC: 6.1 MG/DL (ref 0.7–1.2)
CREAT SERPL-MCNC: 6.4 MG/DL (ref 0.7–1.2)
CREAT SERPL-MCNC: 6.5 MG/DL (ref 0.7–1.2)
CRITICAL: ABNORMAL
CRITICAL: NORMAL
DATE ANALYZED: ABNORMAL
DATE ANALYZED: NORMAL
DATE OF COLLECTION: ABNORMAL
DATE OF COLLECTION: NORMAL
DESCRIPTION BLOOD BANK: NORMAL
EKG ATRIAL RATE: 106 BPM
EKG ATRIAL RATE: 111 BPM
EKG ATRIAL RATE: 117 BPM
EKG ATRIAL RATE: 66 BPM
EKG ATRIAL RATE: 74 BPM
EKG ATRIAL RATE: 76 BPM
EKG P AXIS: 42 DEGREES
EKG P AXIS: 58 DEGREES
EKG P AXIS: 59 DEGREES
EKG P AXIS: 65 DEGREES
EKG P AXIS: 66 DEGREES
EKG P AXIS: 68 DEGREES
EKG P-R INTERVAL: 138 MS
EKG P-R INTERVAL: 142 MS
EKG P-R INTERVAL: 144 MS
EKG P-R INTERVAL: 148 MS
EKG P-R INTERVAL: 152 MS
EKG P-R INTERVAL: 164 MS
EKG Q-T INTERVAL: 278 MS
EKG Q-T INTERVAL: 322 MS
EKG Q-T INTERVAL: 342 MS
EKG Q-T INTERVAL: 408 MS
EKG Q-T INTERVAL: 426 MS
EKG Q-T INTERVAL: 428 MS
EKG QRS DURATION: 102 MS
EKG QRS DURATION: 104 MS
EKG QRS DURATION: 110 MS
EKG QRS DURATION: 82 MS
EKG QRS DURATION: 86 MS
EKG QRS DURATION: 98 MS
EKG QTC CALCULATION (BAZETT): 387 MS
EKG QTC CALCULATION (BAZETT): 427 MS
EKG QTC CALCULATION (BAZETT): 448 MS
EKG QTC CALCULATION (BAZETT): 452 MS
EKG QTC CALCULATION (BAZETT): 465 MS
EKG QTC CALCULATION (BAZETT): 479 MS
EKG R AXIS: -10 DEGREES
EKG R AXIS: -23 DEGREES
EKG R AXIS: -3 DEGREES
EKG R AXIS: -9 DEGREES
EKG R AXIS: 26 DEGREES
EKG R AXIS: 73 DEGREES
EKG T AXIS: 151 DEGREES
EKG T AXIS: 16 DEGREES
EKG T AXIS: 21 DEGREES
EKG T AXIS: 48 DEGREES
EKG T AXIS: 88 DEGREES
EKG T AXIS: 92 DEGREES
EKG VENTRICULAR RATE: 106 BPM
EKG VENTRICULAR RATE: 111 BPM
EKG VENTRICULAR RATE: 117 BPM
EKG VENTRICULAR RATE: 66 BPM
EKG VENTRICULAR RATE: 74 BPM
EKG VENTRICULAR RATE: 76 BPM
ENTEROBACTER CLOACAE COMPLEX BY PCR: NOT DETECTED
ENTEROBACTERALES BY PCR: NOT DETECTED
EOSINOPHILS ABSOLUTE: 0 E9/L (ref 0.05–0.5)
EOSINOPHILS ABSOLUTE: 0.12 E9/L (ref 0.05–0.5)
EOSINOPHILS ABSOLUTE: 0.13 E9/L (ref 0.05–0.5)
EOSINOPHILS ABSOLUTE: 0.13 E9/L (ref 0.05–0.5)
EOSINOPHILS ABSOLUTE: 0.14 E9/L (ref 0.05–0.5)
EOSINOPHILS ABSOLUTE: 0.14 E9/L (ref 0.05–0.5)
EOSINOPHILS ABSOLUTE: 0.16 E9/L (ref 0.05–0.5)
EOSINOPHILS ABSOLUTE: 0.17 E9/L (ref 0.05–0.5)
EOSINOPHILS ABSOLUTE: 0.25 E9/L (ref 0.05–0.5)
EOSINOPHILS ABSOLUTE: 0.35 E9/L (ref 0.05–0.5)
EOSINOPHILS ABSOLUTE: 0.41 E9/L (ref 0.05–0.5)
EOSINOPHILS RELATIVE PERCENT: 0.6 % (ref 0–6)
EOSINOPHILS RELATIVE PERCENT: 0.8 % (ref 0–6)
EOSINOPHILS RELATIVE PERCENT: 0.9 % (ref 0–6)
EOSINOPHILS RELATIVE PERCENT: 2.4 % (ref 0–6)
EOSINOPHILS RELATIVE PERCENT: 2.6 % (ref 0–6)
EOSINOPHILS RELATIVE PERCENT: 2.7 % (ref 0–6)
EOSINOPHILS RELATIVE PERCENT: 2.8 % (ref 0–6)
EOSINOPHILS RELATIVE PERCENT: 2.9 % (ref 0–6)
EOSINOPHILS RELATIVE PERCENT: 3.5 % (ref 0–6)
EOSINOPHILS RELATIVE PERCENT: 4.4 % (ref 0–6)
EOSINOPHILS RELATIVE PERCENT: 4.4 % (ref 0–6)
ESCHERICHIA COLI BY PCR: NOT DETECTED
FIBRINOGEN: 607 MG/DL (ref 225–540)
FIO2: 100 %
FIO2: 100 %
FIO2: 60 %
FIO2: 70 %
GFR AFRICAN AMERICAN: 11
GFR AFRICAN AMERICAN: 11
GFR AFRICAN AMERICAN: 12
GFR AFRICAN AMERICAN: 13
GFR AFRICAN AMERICAN: 13
GFR AFRICAN AMERICAN: 15
GFR AFRICAN AMERICAN: 16
GFR AFRICAN AMERICAN: 17
GFR AFRICAN AMERICAN: 18
GFR AFRICAN AMERICAN: 19
GFR AFRICAN AMERICAN: 20
GFR AFRICAN AMERICAN: 21
GFR AFRICAN AMERICAN: 24
GFR AFRICAN AMERICAN: 31
GFR AFRICAN AMERICAN: 33
GFR NON-AFRICAN AMERICAN: 11 ML/MIN/1.73
GFR NON-AFRICAN AMERICAN: 11 ML/MIN/1.73
GFR NON-AFRICAN AMERICAN: 12 ML/MIN/1.73
GFR NON-AFRICAN AMERICAN: 13 ML/MIN/1.73
GFR NON-AFRICAN AMERICAN: 13 ML/MIN/1.73
GFR NON-AFRICAN AMERICAN: 15 ML/MIN/1.73
GFR NON-AFRICAN AMERICAN: 16 ML/MIN/1.73
GFR NON-AFRICAN AMERICAN: 17 ML/MIN/1.73
GFR NON-AFRICAN AMERICAN: 18 ML/MIN/1.73
GFR NON-AFRICAN AMERICAN: 19 ML/MIN/1.73
GFR NON-AFRICAN AMERICAN: 20 ML/MIN/1.73
GFR NON-AFRICAN AMERICAN: 21 ML/MIN/1.73
GFR NON-AFRICAN AMERICAN: 24 ML/MIN/1.73
GFR NON-AFRICAN AMERICAN: 31 ML/MIN/1.73
GFR NON-AFRICAN AMERICAN: 33 ML/MIN/1.73
GLUCOSE BLD-MCNC: 102 MG/DL (ref 74–99)
GLUCOSE BLD-MCNC: 105 MG/DL (ref 74–99)
GLUCOSE BLD-MCNC: 113 MG/DL (ref 74–99)
GLUCOSE BLD-MCNC: 118 MG/DL (ref 74–99)
GLUCOSE BLD-MCNC: 119 MG/DL (ref 74–99)
GLUCOSE BLD-MCNC: 122 MG/DL (ref 74–99)
GLUCOSE BLD-MCNC: 122 MG/DL (ref 74–99)
GLUCOSE BLD-MCNC: 127 MG/DL (ref 74–99)
GLUCOSE BLD-MCNC: 155 MG/DL (ref 74–99)
GLUCOSE BLD-MCNC: 173 MG/DL (ref 74–99)
GLUCOSE BLD-MCNC: 181 MG/DL (ref 74–99)
GLUCOSE BLD-MCNC: 185 MG/DL (ref 74–99)
GLUCOSE BLD-MCNC: 214 MG/DL (ref 74–99)
GLUCOSE BLD-MCNC: 54 MG/DL (ref 74–99)
GLUCOSE BLD-MCNC: 61 MG/DL (ref 74–99)
GLUCOSE BLD-MCNC: 68 MG/DL (ref 74–99)
GLUCOSE BLD-MCNC: 78 MG/DL (ref 74–99)
GLUCOSE BLD-MCNC: 90 MG/DL (ref 74–99)
GLUCOSE BLD-MCNC: 90 MG/DL (ref 74–99)
GLUCOSE BLD-MCNC: 96 MG/DL (ref 74–99)
HAEMOPHILUS INFLUENZAE BY PCR: NOT DETECTED
HBA1C MFR BLD: 5.8 % (ref 4–5.6)
HCO3: 11 MMOL/L (ref 22–26)
HCO3: 12.9 MMOL/L (ref 22–26)
HCO3: 21.5 MMOL/L (ref 22–26)
HCO3: 21.8 MMOL/L (ref 22–26)
HCO3: 25.4 MMOL/L (ref 22–26)
HCO3: 29.5 MMOL/L (ref 22–26)
HCO3: 29.7 MMOL/L (ref 22–26)
HCO3: 8.4 MMOL/L (ref 22–26)
HCO3: 8.9 MMOL/L (ref 22–26)
HCT VFR BLD CALC: 26.8 % (ref 37–54)
HCT VFR BLD CALC: 27.8 % (ref 37–54)
HCT VFR BLD CALC: 29.3 % (ref 37–54)
HCT VFR BLD CALC: 30.8 % (ref 37–54)
HCT VFR BLD CALC: 31 % (ref 37–54)
HCT VFR BLD CALC: 31.4 % (ref 37–54)
HCT VFR BLD CALC: 31.5 % (ref 37–54)
HCT VFR BLD CALC: 32.1 % (ref 37–54)
HCT VFR BLD CALC: 32.5 % (ref 37–54)
HCT VFR BLD CALC: 32.6 % (ref 37–54)
HCT VFR BLD CALC: 32.7 % (ref 37–54)
HCT VFR BLD CALC: 33.1 % (ref 37–54)
HCT VFR BLD CALC: 33.3 % (ref 37–54)
HCT VFR BLD CALC: 33.4 % (ref 37–54)
HCT VFR BLD CALC: 33.8 % (ref 37–54)
HCT VFR BLD CALC: 33.8 % (ref 37–54)
HCT VFR BLD CALC: 35.9 % (ref 37–54)
HDLC SERPL-MCNC: 54 MG/DL
HEMOGLOBIN: 10 G/DL (ref 12.5–16.5)
HEMOGLOBIN: 10.1 G/DL (ref 12.5–16.5)
HEMOGLOBIN: 10.2 G/DL (ref 12.5–16.5)
HEMOGLOBIN: 10.3 G/DL (ref 12.5–16.5)
HEMOGLOBIN: 10.3 G/DL (ref 12.5–16.5)
HEMOGLOBIN: 10.4 G/DL (ref 12.5–16.5)
HEMOGLOBIN: 10.4 G/DL (ref 12.5–16.5)
HEMOGLOBIN: 10.7 G/DL (ref 12.5–16.5)
HEMOGLOBIN: 10.8 G/DL (ref 12.5–16.5)
HEMOGLOBIN: 10.9 G/DL (ref 12.5–16.5)
HEMOGLOBIN: 11.2 G/DL (ref 12.5–16.5)
HEMOGLOBIN: 8 G/DL (ref 12.5–16.5)
HEMOGLOBIN: 8.7 G/DL (ref 12.5–16.5)
HEMOGLOBIN: 9.8 G/DL (ref 12.5–16.5)
HHB: 0.1 % (ref 0–5)
HHB: 1.9 % (ref 0–5)
HHB: 3.2 % (ref 0–5)
HHB: 4 % (ref 0–5)
HHB: 4.5 % (ref 0–5)
HHB: 4.7 % (ref 0–5)
HHB: 4.8 % (ref 0–5)
HHB: 5.2 % (ref 0–5)
HHB: 7 % (ref 0–5)
HUMAN METAPNEUMOVIRUS BY PCR: NOT DETECTED
HUMAN RHINOVIRUS/ENTEROVIRUS BY PCR: NOT DETECTED
HYPOCHROMIA: ABNORMAL
IMMATURE GRANULOCYTES #: 0.02 E9/L
IMMATURE GRANULOCYTES #: 0.03 E9/L
IMMATURE GRANULOCYTES %: 0.4 % (ref 0–5)
IMMATURE GRANULOCYTES %: 0.6 % (ref 0–5)
INFLUENZA A BY PCR: NOT DETECTED
INFLUENZA B BY PCR: NOT DETECTED
INR BLD: 1.6
INR BLD: 1.9
INR BLD: 2.1
INR BLD: 2.3
INR BLD: 2.7
INR BLD: 2.8
INR BLD: 3.5
INR BLD: 3.5
IRON SATURATION: 24 % (ref 20–55)
IRON: 44 MCG/DL (ref 59–158)
KLEBSIELLA OXYTOCA BY PCR: NOT DETECTED
KLEBSIELLA PNEUMONIAE GROUP BY PCR: NOT DETECTED
LAB: ABNORMAL
LAB: NORMAL
LACTIC ACID: 19.9 MMOL/L (ref 0.5–2.2)
LACTIC ACID: 20.9 MMOL/L (ref 0.5–2.2)
LACTIC ACID: 30.1 MMOL/L (ref 0.5–2.2)
LACTIC ACID: 5 MMOL/L (ref 0.5–2.2)
LACTIC ACID: 5.9 MMOL/L (ref 0.5–2.2)
LACTIC ACID: 8.3 MMOL/L (ref 0.5–2.2)
LACTIC ACID: 8.3 MMOL/L (ref 0.5–2.2)
LACTIC ACID: 9.4 MMOL/L (ref 0.5–2.2)
LACTIC ACID: 9.5 MMOL/L (ref 0.5–2.2)
LACTIC ACID: 9.8 MMOL/L (ref 0.5–2.2)
LDL CHOLESTEROL CALCULATED: 31 MG/DL (ref 0–99)
LIPASE: 6 U/L (ref 13–60)
LISTERIA MONOCYTOGENES BY PCR: NOT DETECTED
LV EF: 35 %
LVEF MODALITY: NORMAL
LYMPHOCYTES ABSOLUTE: 0.09 E9/L (ref 1.5–4)
LYMPHOCYTES ABSOLUTE: 0.14 E9/L (ref 1.5–4)
LYMPHOCYTES ABSOLUTE: 0.19 E9/L (ref 1.5–4)
LYMPHOCYTES ABSOLUTE: 0.19 E9/L (ref 1.5–4)
LYMPHOCYTES ABSOLUTE: 0.24 E9/L (ref 1.5–4)
LYMPHOCYTES ABSOLUTE: 0.32 E9/L (ref 1.5–4)
LYMPHOCYTES ABSOLUTE: 0.44 E9/L (ref 1.5–4)
LYMPHOCYTES ABSOLUTE: 0.68 E9/L (ref 1.5–4)
LYMPHOCYTES ABSOLUTE: 0.71 E9/L (ref 1.5–4)
LYMPHOCYTES ABSOLUTE: 0.74 E9/L (ref 1.5–4)
LYMPHOCYTES ABSOLUTE: 0.77 E9/L (ref 1.5–4)
LYMPHOCYTES ABSOLUTE: 0.77 E9/L (ref 1.5–4)
LYMPHOCYTES ABSOLUTE: 1.78 E9/L (ref 1.5–4)
LYMPHOCYTES RELATIVE PERCENT: 0.9 % (ref 20–42)
LYMPHOCYTES RELATIVE PERCENT: 0.9 % (ref 20–42)
LYMPHOCYTES RELATIVE PERCENT: 1.7 % (ref 20–42)
LYMPHOCYTES RELATIVE PERCENT: 1.8 % (ref 20–42)
LYMPHOCYTES RELATIVE PERCENT: 13.2 % (ref 20–42)
LYMPHOCYTES RELATIVE PERCENT: 13.4 % (ref 20–42)
LYMPHOCYTES RELATIVE PERCENT: 13.4 % (ref 20–42)
LYMPHOCYTES RELATIVE PERCENT: 14 % (ref 20–42)
LYMPHOCYTES RELATIVE PERCENT: 16.3 % (ref 20–42)
LYMPHOCYTES RELATIVE PERCENT: 2.7 % (ref 20–42)
LYMPHOCYTES RELATIVE PERCENT: 3.5 % (ref 20–42)
LYMPHOCYTES RELATIVE PERCENT: 4.3 % (ref 20–42)
LYMPHOCYTES RELATIVE PERCENT: 5.2 % (ref 20–42)
Lab: ABNORMAL
Lab: NORMAL
MAGNESIUM: 1.7 MG/DL (ref 1.6–2.6)
MAGNESIUM: 1.8 MG/DL (ref 1.6–2.6)
MAGNESIUM: 1.9 MG/DL (ref 1.6–2.6)
MAGNESIUM: 2 MG/DL (ref 1.6–2.6)
MAGNESIUM: 2.1 MG/DL (ref 1.6–2.6)
MAGNESIUM: 2.2 MG/DL (ref 1.6–2.6)
MAGNESIUM: 2.3 MG/DL (ref 1.6–2.6)
MCH RBC QN AUTO: 27.7 PG (ref 26–35)
MCH RBC QN AUTO: 27.9 PG (ref 26–35)
MCH RBC QN AUTO: 27.9 PG (ref 26–35)
MCH RBC QN AUTO: 28.1 PG (ref 26–35)
MCH RBC QN AUTO: 28.1 PG (ref 26–35)
MCH RBC QN AUTO: 28.2 PG (ref 26–35)
MCH RBC QN AUTO: 28.3 PG (ref 26–35)
MCH RBC QN AUTO: 28.3 PG (ref 26–35)
MCH RBC QN AUTO: 28.4 PG (ref 26–35)
MCH RBC QN AUTO: 28.4 PG (ref 26–35)
MCH RBC QN AUTO: 28.5 PG (ref 26–35)
MCH RBC QN AUTO: 28.5 PG (ref 26–35)
MCH RBC QN AUTO: 28.6 PG (ref 26–35)
MCH RBC QN AUTO: 28.8 PG (ref 26–35)
MCH RBC QN AUTO: 28.9 PG (ref 26–35)
MCH RBC QN AUTO: 29.1 PG (ref 26–35)
MCH RBC QN AUTO: 29.5 PG (ref 26–35)
MCHC RBC AUTO-ENTMCNC: 29.9 % (ref 32–34.5)
MCHC RBC AUTO-ENTMCNC: 30.2 % (ref 32–34.5)
MCHC RBC AUTO-ENTMCNC: 30.5 % (ref 32–34.5)
MCHC RBC AUTO-ENTMCNC: 30.8 % (ref 32–34.5)
MCHC RBC AUTO-ENTMCNC: 31.2 % (ref 32–34.5)
MCHC RBC AUTO-ENTMCNC: 31.2 % (ref 32–34.5)
MCHC RBC AUTO-ENTMCNC: 31.3 % (ref 32–34.5)
MCHC RBC AUTO-ENTMCNC: 31.3 % (ref 32–34.5)
MCHC RBC AUTO-ENTMCNC: 32.1 % (ref 32–34.5)
MCHC RBC AUTO-ENTMCNC: 32.2 % (ref 32–34.5)
MCHC RBC AUTO-ENTMCNC: 32.4 % (ref 32–34.5)
MCHC RBC AUTO-ENTMCNC: 32.8 % (ref 32–34.5)
MCHC RBC AUTO-ENTMCNC: 32.8 % (ref 32–34.5)
MCHC RBC AUTO-ENTMCNC: 32.9 % (ref 32–34.5)
MCHC RBC AUTO-ENTMCNC: 32.9 % (ref 32–34.5)
MCHC RBC AUTO-ENTMCNC: 33 % (ref 32–34.5)
MCHC RBC AUTO-ENTMCNC: 33.4 % (ref 32–34.5)
MCV RBC AUTO: 84.4 FL (ref 80–99.9)
MCV RBC AUTO: 84.9 FL (ref 80–99.9)
MCV RBC AUTO: 85.6 FL (ref 80–99.9)
MCV RBC AUTO: 85.9 FL (ref 80–99.9)
MCV RBC AUTO: 86.3 FL (ref 80–99.9)
MCV RBC AUTO: 86.7 FL (ref 80–99.9)
MCV RBC AUTO: 87.6 FL (ref 80–99.9)
MCV RBC AUTO: 88.5 FL (ref 80–99.9)
MCV RBC AUTO: 88.7 FL (ref 80–99.9)
MCV RBC AUTO: 91.1 FL (ref 80–99.9)
MCV RBC AUTO: 91.6 FL (ref 80–99.9)
MCV RBC AUTO: 92.3 FL (ref 80–99.9)
MCV RBC AUTO: 92.4 FL (ref 80–99.9)
MCV RBC AUTO: 93 FL (ref 80–99.9)
MCV RBC AUTO: 93.4 FL (ref 80–99.9)
MCV RBC AUTO: 93.6 FL (ref 80–99.9)
MCV RBC AUTO: 93.8 FL (ref 80–99.9)
METAMYELOCYTES RELATIVE PERCENT: 0.9 % (ref 0–1)
METAMYELOCYTES RELATIVE PERCENT: 0.9 % (ref 0–1)
METAMYELOCYTES RELATIVE PERCENT: 1.7 % (ref 0–1)
METAMYELOCYTES RELATIVE PERCENT: 1.8 % (ref 0–1)
METAMYELOCYTES RELATIVE PERCENT: 4.3 % (ref 0–1)
METAMYELOCYTES RELATIVE PERCENT: 4.3 % (ref 0–1)
METER GLUCOSE: 101 MG/DL (ref 74–99)
METER GLUCOSE: 103 MG/DL (ref 74–99)
METER GLUCOSE: 103 MG/DL (ref 74–99)
METER GLUCOSE: 107 MG/DL (ref 74–99)
METER GLUCOSE: 108 MG/DL (ref 74–99)
METER GLUCOSE: 111 MG/DL (ref 74–99)
METER GLUCOSE: 112 MG/DL (ref 74–99)
METER GLUCOSE: 116 MG/DL (ref 74–99)
METER GLUCOSE: 119 MG/DL (ref 74–99)
METER GLUCOSE: 131 MG/DL (ref 74–99)
METER GLUCOSE: 151 MG/DL (ref 74–99)
METER GLUCOSE: 185 MG/DL (ref 74–99)
METER GLUCOSE: 186 MG/DL (ref 74–99)
METER GLUCOSE: 41 MG/DL (ref 74–99)
METER GLUCOSE: 54 MG/DL (ref 74–99)
METER GLUCOSE: 55 MG/DL (ref 74–99)
METER GLUCOSE: 57 MG/DL (ref 74–99)
METER GLUCOSE: 59 MG/DL (ref 74–99)
METER GLUCOSE: 65 MG/DL (ref 74–99)
METER GLUCOSE: 66 MG/DL (ref 74–99)
METER GLUCOSE: 66 MG/DL (ref 74–99)
METER GLUCOSE: 67 MG/DL (ref 74–99)
METER GLUCOSE: 70 MG/DL (ref 74–99)
METER GLUCOSE: 72 MG/DL (ref 74–99)
METER GLUCOSE: 73 MG/DL (ref 74–99)
METER GLUCOSE: 74 MG/DL (ref 74–99)
METER GLUCOSE: 74 MG/DL (ref 74–99)
METER GLUCOSE: 76 MG/DL (ref 74–99)
METER GLUCOSE: 80 MG/DL (ref 74–99)
METER GLUCOSE: 82 MG/DL (ref 74–99)
METER GLUCOSE: 84 MG/DL (ref 74–99)
METER GLUCOSE: 88 MG/DL (ref 74–99)
METER GLUCOSE: 89 MG/DL (ref 74–99)
METER GLUCOSE: 92 MG/DL (ref 74–99)
METER GLUCOSE: 94 MG/DL (ref 74–99)
METHB: 0.1 % (ref 0–1.5)
METHB: 0.2 % (ref 0–1.5)
METHB: 0.3 % (ref 0–1.5)
METHB: 0.4 % (ref 0–1.5)
METHB: 0.7 % (ref 0–1.5)
METHICILLIN RESISTANCE MECA/C  BY PCR: NOT DETECTED
MODE: ABNORMAL
MODE: AC
MONOCYTES ABSOLUTE: 0 E9/L (ref 0.1–0.95)
MONOCYTES ABSOLUTE: 0.27 E9/L (ref 0.1–0.95)
MONOCYTES ABSOLUTE: 0.72 E9/L (ref 0.1–0.95)
MONOCYTES ABSOLUTE: 0.76 E9/L (ref 0.1–0.95)
MONOCYTES ABSOLUTE: 0.79 E9/L (ref 0.1–0.95)
MONOCYTES ABSOLUTE: 0.83 E9/L (ref 0.1–0.95)
MONOCYTES ABSOLUTE: 0.85 E9/L (ref 0.1–0.95)
MONOCYTES ABSOLUTE: 0.93 E9/L (ref 0.1–0.95)
MONOCYTES ABSOLUTE: 1.06 E9/L (ref 0.1–0.95)
MONOCYTES ABSOLUTE: 3.22 E9/L (ref 0.1–0.95)
MONOCYTES ABSOLUTE: 4.42 E9/L (ref 0.1–0.95)
MONOCYTES ABSOLUTE: 6.57 E9/L (ref 0.1–0.95)
MONOCYTES ABSOLUTE: 6.74 E9/L (ref 0.1–0.95)
MONOCYTES RELATIVE PERCENT: 14.8 % (ref 2–12)
MONOCYTES RELATIVE PERCENT: 15 % (ref 2–12)
MONOCYTES RELATIVE PERCENT: 15.1 % (ref 2–12)
MONOCYTES RELATIVE PERCENT: 15.3 % (ref 2–12)
MONOCYTES RELATIVE PERCENT: 16.1 % (ref 2–12)
MONOCYTES RELATIVE PERCENT: 16.1 % (ref 2–12)
MONOCYTES RELATIVE PERCENT: 16.8 % (ref 2–12)
MONOCYTES RELATIVE PERCENT: 19.1 % (ref 2–12)
MONOCYTES RELATIVE PERCENT: 2.6 % (ref 2–12)
MONOCYTES RELATIVE PERCENT: 3.2 % (ref 2–12)
MONOCYTES RELATIVE PERCENT: 30.4 % (ref 2–12)
MONOCYTES RELATIVE PERCENT: 46.5 % (ref 2–12)
MONOCYTES RELATIVE PERCENT: 52.2 % (ref 2–12)
MYCOPLASMA PNEUMONIAE BY PCR: NOT DETECTED
MYELOCYTE PERCENT: 0.9 % (ref 0–0)
MYELOCYTE PERCENT: 2.6 % (ref 0–0)
MYELOCYTE PERCENT: 3.5 % (ref 0–0)
NEISSERIA MENINGITIDIS BY PCR: NOT DETECTED
NEUTROPHILS ABSOLUTE: 14.89 E9/L (ref 1.8–7.3)
NEUTROPHILS ABSOLUTE: 2.67 E9/L (ref 1.8–7.3)
NEUTROPHILS ABSOLUTE: 2.95 E9/L (ref 1.8–7.3)
NEUTROPHILS ABSOLUTE: 26.98 E9/L (ref 1.8–7.3)
NEUTROPHILS ABSOLUTE: 3.48 E9/L (ref 1.8–7.3)
NEUTROPHILS ABSOLUTE: 3.51 E9/L (ref 1.8–7.3)
NEUTROPHILS ABSOLUTE: 3.65 E9/L (ref 1.8–7.3)
NEUTROPHILS ABSOLUTE: 3.7 E9/L (ref 1.8–7.3)
NEUTROPHILS ABSOLUTE: 3.71 E9/L (ref 1.8–7.3)
NEUTROPHILS ABSOLUTE: 4.32 E9/L (ref 1.8–7.3)
NEUTROPHILS ABSOLUTE: 5.61 E9/L (ref 1.8–7.3)
NEUTROPHILS ABSOLUTE: 7.27 E9/L (ref 1.8–7.3)
NEUTROPHILS ABSOLUTE: 8.63 E9/L (ref 1.8–7.3)
NEUTROPHILS RELATIVE PERCENT: 38.1 % (ref 43–80)
NEUTROPHILS RELATIVE PERCENT: 44.7 % (ref 43–80)
NEUTROPHILS RELATIVE PERCENT: 60.9 % (ref 43–80)
NEUTROPHILS RELATIVE PERCENT: 62.7 % (ref 43–80)
NEUTROPHILS RELATIVE PERCENT: 65.8 % (ref 43–80)
NEUTROPHILS RELATIVE PERCENT: 66.5 % (ref 43–80)
NEUTROPHILS RELATIVE PERCENT: 67.6 % (ref 43–80)
NEUTROPHILS RELATIVE PERCENT: 67.6 % (ref 43–80)
NEUTROPHILS RELATIVE PERCENT: 73 % (ref 43–80)
NEUTROPHILS RELATIVE PERCENT: 76.1 % (ref 43–80)
NEUTROPHILS RELATIVE PERCENT: 77.4 % (ref 43–80)
NEUTROPHILS RELATIVE PERCENT: 91.2 % (ref 43–80)
NEUTROPHILS RELATIVE PERCENT: 92.2 % (ref 43–80)
NUCLEATED RED BLOOD CELLS: 1.8 /100 WBC
NUCLEATED RED BLOOD CELLS: 1.8 /100 WBC
NUCLEATED RED BLOOD CELLS: 2.7 /100 WBC
NUCLEATED RED BLOOD CELLS: 3.5 /100 WBC
NUCLEATED RED BLOOD CELLS: 4.3 /100 WBC
NUCLEATED RED BLOOD CELLS: 5.2 /100 WBC
O2 CONTENT: 11.2 ML/DL
O2 CONTENT: 12.3 ML/DL
O2 CONTENT: 12.6 ML/DL
O2 CONTENT: 12.8 ML/DL
O2 CONTENT: 15.2 ML/DL
O2 CONTENT: 15.8 ML/DL
O2 CONTENT: 16.9 ML/DL
O2 SATURATION: 92.8 % (ref 92–98.5)
O2 SATURATION: 94.7 % (ref 92–98.5)
O2 SATURATION: 95.1 % (ref 92–98.5)
O2 SATURATION: 95.2 % (ref 92–98.5)
O2 SATURATION: 95.4 % (ref 92–98.5)
O2 SATURATION: 95.9 % (ref 92–98.5)
O2 SATURATION: 96.8 % (ref 92–98.5)
O2 SATURATION: 98.1 % (ref 92–98.5)
O2 SATURATION: 99.9 % (ref 92–98.5)
O2HB: 90.7 % (ref 94–97)
O2HB: 92.9 % (ref 94–97)
O2HB: 93.7 % (ref 94–97)
O2HB: 93.7 % (ref 94–97)
O2HB: 94 % (ref 94–97)
O2HB: 94 % (ref 94–97)
O2HB: 95.3 % (ref 94–97)
O2HB: 96.8 % (ref 94–97)
O2HB: 98.4 % (ref 94–97)
OPERATOR ID: 1874
OPERATOR ID: 359
OPERATOR ID: 405
OPERATOR ID: 421
OPERATOR ID: ABNORMAL
ORDER NUMBER: ABNORMAL
OVALOCYTES: ABNORMAL
PARAINFLUENZA VIRUS 1 BY PCR: NOT DETECTED
PARAINFLUENZA VIRUS 2 BY PCR: NOT DETECTED
PARAINFLUENZA VIRUS 3 BY PCR: NOT DETECTED
PARAINFLUENZA VIRUS 4 BY PCR: NOT DETECTED
PARATHYROID HORMONE INTACT: 257 PG/ML (ref 15–65)
PATHOLOGIST REVIEW: NORMAL
PATIENT TEMP: 37 C
PCO2: 29.7 MMHG (ref 35–45)
PCO2: 32.8 MMHG (ref 35–45)
PCO2: 41.4 MMHG (ref 35–45)
PCO2: 41.5 MMHG (ref 35–45)
PCO2: 41.6 MMHG (ref 35–45)
PCO2: 43.1 MMHG (ref 35–45)
PCO2: 43.8 MMHG (ref 35–45)
PCO2: 49.5 MMHG (ref 35–45)
PCO2: 49.5 MMHG (ref 35–45)
PDW BLD-RTO: 17.5 FL (ref 11.5–15)
PDW BLD-RTO: 17.9 FL (ref 11.5–15)
PDW BLD-RTO: 17.9 FL (ref 11.5–15)
PDW BLD-RTO: 18.2 FL (ref 11.5–15)
PDW BLD-RTO: 18.3 FL (ref 11.5–15)
PDW BLD-RTO: 18.4 FL (ref 11.5–15)
PDW BLD-RTO: 18.5 FL (ref 11.5–15)
PDW BLD-RTO: 19.6 FL (ref 11.5–15)
PDW BLD-RTO: 19.7 FL (ref 11.5–15)
PDW BLD-RTO: 19.7 FL (ref 11.5–15)
PDW BLD-RTO: 19.8 FL (ref 11.5–15)
PDW BLD-RTO: 19.9 FL (ref 11.5–15)
PDW BLD-RTO: 20.3 FL (ref 11.5–15)
PDW BLD-RTO: 20.7 FL (ref 11.5–15)
PDW BLD-RTO: 22.2 FL (ref 11.5–15)
PEEP/CPAP: 5 CMH2O
PFO2: 1.21 MMHG/%
PFO2: 1.35 MMHG/%
PFO2: 1.39 MMHG/%
PFO2: 1.39 MMHG/%
PFO2: 1.42 MMHG/%
PFO2: 1.46 MMHG/%
PFO2: 1.57 MMHG/%
PFO2: 3.19 MMHG/%
PH BLOOD GAS: 7.04 (ref 7.35–7.45)
PH BLOOD GAS: 7.04 (ref 7.35–7.45)
PH BLOOD GAS: 7.05 (ref 7.35–7.45)
PH BLOOD GAS: 7.07 (ref 7.35–7.45)
PH BLOOD GAS: 7.33 (ref 7.35–7.45)
PH BLOOD GAS: 7.34 (ref 7.35–7.45)
PH BLOOD GAS: 7.38 (ref 7.35–7.45)
PH BLOOD GAS: 7.39 (ref 7.35–7.45)
PH BLOOD GAS: 7.46 (ref 7.35–7.45)
PHOSPHORUS: 2.3 MG/DL (ref 2.5–4.5)
PHOSPHORUS: 2.6 MG/DL (ref 2.5–4.5)
PHOSPHORUS: 2.9 MG/DL (ref 2.5–4.5)
PHOSPHORUS: 3.5 MG/DL (ref 2.5–4.5)
PHOSPHORUS: 5.2 MG/DL (ref 2.5–4.5)
PHOSPHORUS: 7 MG/DL (ref 2.5–4.5)
PHOSPHORUS: 7.4 MG/DL (ref 2.5–4.5)
PHOSPHORUS: 8.3 MG/DL (ref 2.5–4.5)
PLATELET # BLD: 114 E9/L (ref 130–450)
PLATELET # BLD: 136 E9/L (ref 130–450)
PLATELET # BLD: 143 E9/L (ref 130–450)
PLATELET # BLD: 152 E9/L (ref 130–450)
PLATELET # BLD: 158 E9/L (ref 130–450)
PLATELET # BLD: 163 E9/L (ref 130–450)
PLATELET # BLD: 171 E9/L (ref 130–450)
PLATELET # BLD: 173 E9/L (ref 130–450)
PLATELET # BLD: 175 E9/L (ref 130–450)
PLATELET # BLD: 183 E9/L (ref 130–450)
PLATELET # BLD: 27 E9/L (ref 130–450)
PLATELET # BLD: 33 E9/L (ref 130–450)
PLATELET # BLD: 36 E9/L (ref 130–450)
PLATELET # BLD: 45 E9/L (ref 130–450)
PLATELET # BLD: 88 E9/L (ref 130–450)
PLATELET CONFIRMATION: NORMAL
PMV BLD AUTO: 10 FL (ref 7–12)
PMV BLD AUTO: 10 FL (ref 7–12)
PMV BLD AUTO: 10.2 FL (ref 7–12)
PMV BLD AUTO: 10.3 FL (ref 7–12)
PMV BLD AUTO: 10.3 FL (ref 7–12)
PMV BLD AUTO: 10.4 FL (ref 7–12)
PMV BLD AUTO: 10.6 FL (ref 7–12)
PMV BLD AUTO: 10.9 FL (ref 7–12)
PMV BLD AUTO: 11.1 FL (ref 7–12)
PMV BLD AUTO: 9.4 FL (ref 7–12)
PMV BLD AUTO: 9.7 FL (ref 7–12)
PMV BLD AUTO: 9.8 FL (ref 7–12)
PMV BLD AUTO: 9.8 FL (ref 7–12)
PMV BLD AUTO: ABNORMAL FL (ref 7–12)
PO2: 101.9 MMHG (ref 75–100)
PO2: 135.1 MMHG (ref 75–100)
PO2: 318.8 MMHG (ref 75–100)
PO2: 78.8 MMHG (ref 75–100)
PO2: 83.7 MMHG (ref 75–100)
PO2: 84.4 MMHG (ref 75–100)
PO2: 85 MMHG (ref 75–100)
PO2: 94 MMHG (ref 75–100)
PO2: 97.2 MMHG (ref 75–100)
POIKILOCYTES: ABNORMAL
POLYCHROMASIA: ABNORMAL
POTASSIUM REFLEX MAGNESIUM: 3.3 MMOL/L (ref 3.5–5)
POTASSIUM REFLEX MAGNESIUM: 3.4 MMOL/L (ref 3.5–5)
POTASSIUM REFLEX MAGNESIUM: 4.1 MMOL/L (ref 3.5–5)
POTASSIUM REFLEX MAGNESIUM: 4.3 MMOL/L (ref 3.5–5)
POTASSIUM REFLEX MAGNESIUM: 4.5 MMOL/L (ref 3.5–5)
POTASSIUM REFLEX MAGNESIUM: 4.6 MMOL/L (ref 3.5–5)
POTASSIUM REFLEX MAGNESIUM: 5 MMOL/L (ref 3.5–5)
POTASSIUM REFLEX MAGNESIUM: 5.9 MMOL/L (ref 3.5–5)
POTASSIUM REFLEX MAGNESIUM: 6.5 MMOL/L (ref 3.5–5)
POTASSIUM SERPL-SCNC: 3.1 MMOL/L (ref 3.5–5)
POTASSIUM SERPL-SCNC: 3.5 MMOL/L (ref 3.5–5)
POTASSIUM SERPL-SCNC: 3.5 MMOL/L (ref 3.5–5)
POTASSIUM SERPL-SCNC: 3.9 MMOL/L (ref 3.5–5)
POTASSIUM SERPL-SCNC: 3.9 MMOL/L (ref 3.5–5)
POTASSIUM SERPL-SCNC: 4 MMOL/L (ref 3.5–5)
POTASSIUM SERPL-SCNC: 4.2 MMOL/L (ref 3.5–5)
POTASSIUM SERPL-SCNC: 4.3 MMOL/L (ref 3.5–5)
POTASSIUM SERPL-SCNC: 4.7 MMOL/L (ref 3.5–5)
POTASSIUM SERPL-SCNC: 5.4 MMOL/L (ref 3.5–5)
POTASSIUM SERPL-SCNC: 6.2 MMOL/L (ref 3.5–5)
PRO-BNP: ABNORMAL PG/ML (ref 0–125)
PRO-BNP: ABNORMAL PG/ML (ref 0–125)
PROCALCITONIN: 18.57 NG/ML (ref 0–0.08)
PROCALCITONIN: 29.29 NG/ML (ref 0–0.08)
PROMYELOCYTES PERCENT: 0.9 % (ref 0–0)
PROTEUS SPECIES BY PCR: NOT DETECTED
PROTHROMBIN TIME: 18.4 SEC (ref 9.3–12.4)
PROTHROMBIN TIME: 21.3 SEC (ref 9.3–12.4)
PROTHROMBIN TIME: 22.8 SEC (ref 9.3–12.4)
PROTHROMBIN TIME: 24.9 SEC (ref 9.3–12.4)
PROTHROMBIN TIME: 30 SEC (ref 9.3–12.4)
PROTHROMBIN TIME: 30.4 SEC (ref 9.3–12.4)
PROTHROMBIN TIME: 37.7 SEC (ref 9.3–12.4)
PROTHROMBIN TIME: 38.5 SEC (ref 9.3–12.4)
PSEUDOMONAS AERUGINOSA BY PCR: NOT DETECTED
RBC # BLD: 2.87 E12/L (ref 3.8–5.8)
RBC # BLD: 3.14 E12/L (ref 3.8–5.8)
RBC # BLD: 3.47 E12/L (ref 3.8–5.8)
RBC # BLD: 3.53 E12/L (ref 3.8–5.8)
RBC # BLD: 3.53 E12/L (ref 3.8–5.8)
RBC # BLD: 3.57 E12/L (ref 3.8–5.8)
RBC # BLD: 3.58 E12/L (ref 3.8–5.8)
RBC # BLD: 3.6 E12/L (ref 3.8–5.8)
RBC # BLD: 3.61 E12/L (ref 3.8–5.8)
RBC # BLD: 3.62 E12/L (ref 3.8–5.8)
RBC # BLD: 3.62 E12/L (ref 3.8–5.8)
RBC # BLD: 3.65 E12/L (ref 3.8–5.8)
RBC # BLD: 3.66 E12/L (ref 3.8–5.8)
RBC # BLD: 3.69 E12/L (ref 3.8–5.8)
RBC # BLD: 3.71 E12/L (ref 3.8–5.8)
RBC # BLD: 3.85 E12/L (ref 3.8–5.8)
RBC # BLD: 3.94 E12/L (ref 3.8–5.8)
RESPIRATORY SYNCYTIAL VIRUS BY PCR: NOT DETECTED
RI(T): 1
RI(T): 2.88
RI(T): 3.32
RI(T): 3.39
RI(T): 3.49
RI(T): 320 %
RI(T): 385 %
RI(T): 4.33
ROULEAUX: ABNORMAL
RR MECHANICAL: 16 B/MIN
RR MECHANICAL: 20 B/MIN
RR MECHANICAL: 25 B/MIN
RR MECHANICAL: 25 B/MIN
SARS-COV-2, PCR: NOT DETECTED
SCHISTOCYTES: ABNORMAL
SERRATIA MARCESCENS BY PCR: NOT DETECTED
SODIUM BLD-SCNC: 130 MMOL/L (ref 132–146)
SODIUM BLD-SCNC: 131 MMOL/L (ref 132–146)
SODIUM BLD-SCNC: 131 MMOL/L (ref 132–146)
SODIUM BLD-SCNC: 132 MMOL/L (ref 132–146)
SODIUM BLD-SCNC: 133 MMOL/L (ref 132–146)
SODIUM BLD-SCNC: 133 MMOL/L (ref 132–146)
SODIUM BLD-SCNC: 134 MMOL/L (ref 132–146)
SODIUM BLD-SCNC: 134 MMOL/L (ref 132–146)
SODIUM BLD-SCNC: 135 MMOL/L (ref 132–146)
SODIUM BLD-SCNC: 136 MMOL/L (ref 132–146)
SODIUM BLD-SCNC: 137 MMOL/L (ref 132–146)
SODIUM BLD-SCNC: 137 MMOL/L (ref 132–146)
SODIUM BLD-SCNC: 140 MMOL/L (ref 132–146)
SODIUM BLD-SCNC: 140 MMOL/L (ref 132–146)
SODIUM BLD-SCNC: 141 MMOL/L (ref 132–146)
SODIUM BLD-SCNC: 143 MMOL/L (ref 132–146)
SODIUM BLD-SCNC: 143 MMOL/L (ref 132–146)
SOURCE OF BLOOD CULTURE: ABNORMAL
SOURCE, BLOOD GAS: ABNORMAL
SOURCE, BLOOD GAS: NORMAL
STAPHYLOCOCCUS AUREUS BY PCR: DETECTED
STAPHYLOCOCCUS SPECIES BY PCR: DETECTED
STREPTOCOCCUS AGALACTIAE BY PCR: NOT DETECTED
STREPTOCOCCUS PNEUMONIAE BY PCR: NOT DETECTED
STREPTOCOCCUS PYOGENES  BY PCR: NOT DETECTED
STREPTOCOCCUS SPECIES BY PCR: NOT DETECTED
T4 FREE: 1.49 NG/DL (ref 0.93–1.7)
TARGET CELLS: ABNORMAL
TEAR DROP CELLS: ABNORMAL
THB: 11.4 G/DL (ref 11.5–16.5)
THB: 11.4 G/DL (ref 11.5–16.5)
THB: 11.5 G/DL (ref 11.5–16.5)
THB: 11.6 G/DL (ref 11.5–16.5)
THB: 11.7 G/DL (ref 11.5–16.5)
THB: 8.7 G/DL (ref 11.5–16.5)
THB: 9.2 G/DL (ref 11.5–16.5)
THB: 9.3 G/DL (ref 11.5–16.5)
THB: 9.4 G/DL (ref 11.5–16.5)
TIME ANALYZED: 1253
TIME ANALYZED: 1357
TIME ANALYZED: 1826
TIME ANALYZED: 422
TIME ANALYZED: 449
TIME ANALYZED: 527
TIME ANALYZED: 57
TIME ANALYZED: 605
TIME ANALYZED: 926
TOTAL CK: 185 U/L (ref 20–200)
TOTAL IRON BINDING CAPACITY: 187 MCG/DL (ref 250–450)
TOTAL PROTEIN: 4.3 G/DL (ref 6.4–8.3)
TOTAL PROTEIN: 4.8 G/DL (ref 6.4–8.3)
TOTAL PROTEIN: 5 G/DL (ref 6.4–8.3)
TOTAL PROTEIN: 5.1 G/DL (ref 6.4–8.3)
TOTAL PROTEIN: 5.3 G/DL (ref 6.4–8.3)
TOTAL PROTEIN: 5.3 G/DL (ref 6.4–8.3)
TOTAL PROTEIN: 5.5 G/DL (ref 6.4–8.3)
TOTAL PROTEIN: 6.1 G/DL (ref 6.4–8.3)
TOTAL PROTEIN: 7.2 G/DL (ref 6.4–8.3)
TRIGL SERPL-MCNC: 51 MG/DL (ref 0–149)
TROPONIN, HIGH SENSITIVITY: 72 NG/L (ref 0–11)
TROPONIN, HIGH SENSITIVITY: 76 NG/L (ref 0–11)
TROPONIN: 0.11 NG/ML (ref 0–0.03)
TROPONIN: 0.12 NG/ML (ref 0–0.03)
TROPONIN: 0.13 NG/ML (ref 0–0.03)
TSH SERPL DL<=0.05 MIU/L-ACNC: 0.91 UIU/ML (ref 0.27–4.2)
TSH SERPL DL<=0.05 MIU/L-ACNC: 4.66 UIU/ML (ref 0.27–4.2)
VACUOLATED NEUTROPHILS: ABNORMAL
VACUOLATED NEUTROPHILS: ABNORMAL
VITAMIN D 25-HYDROXY: 26 NG/ML (ref 30–100)
VLDLC SERPL CALC-MCNC: 10 MG/DL
VT MECHANICAL: 450 ML
VT MECHANICAL: 500 ML
VT MECHANICAL: 500 ML
WBC # BLD: 21.9 E9/L (ref 4.5–11.5)
WBC # BLD: 34.1 E9/L (ref 4.5–11.5)
WBC # BLD: 35.5 E9/L (ref 4.5–11.5)
WBC # BLD: 4.7 E9/L (ref 4.5–11.5)
WBC # BLD: 4.7 E9/L (ref 4.5–11.5)
WBC # BLD: 4.8 E9/L (ref 4.5–11.5)
WBC # BLD: 4.9 E9/L (ref 4.5–11.5)
WBC # BLD: 5.1 E9/L (ref 4.5–11.5)
WBC # BLD: 5.2 E9/L (ref 4.5–11.5)
WBC # BLD: 5.3 E9/L (ref 4.5–11.5)
WBC # BLD: 5.5 E9/L (ref 4.5–11.5)
WBC # BLD: 5.5 E9/L (ref 4.5–11.5)
WBC # BLD: 6.2 E9/L (ref 4.5–11.5)
WBC # BLD: 7.1 E9/L (ref 4.5–11.5)
WBC # BLD: 7.9 E9/L (ref 4.5–11.5)
WBC # BLD: 8.9 E9/L (ref 4.5–11.5)
WBC # BLD: 9.4 E9/L (ref 4.5–11.5)

## 2021-01-01 PROCEDURE — 36556 INSERT NON-TUNNEL CV CATH: CPT

## 2021-01-01 PROCEDURE — 83550 IRON BINDING TEST: CPT

## 2021-01-01 PROCEDURE — 6360000002 HC RX W HCPCS: Performed by: NURSE ANESTHETIST, CERTIFIED REGISTERED

## 2021-01-01 PROCEDURE — 2700000000 HC OXYGEN THERAPY PER DAY

## 2021-01-01 PROCEDURE — 84484 ASSAY OF TROPONIN QUANT: CPT

## 2021-01-01 PROCEDURE — 36415 COLL VENOUS BLD VENIPUNCTURE: CPT

## 2021-01-01 PROCEDURE — 85610 PROTHROMBIN TIME: CPT

## 2021-01-01 PROCEDURE — 82805 BLOOD GASES W/O2 SATURATION: CPT

## 2021-01-01 PROCEDURE — 2500000003 HC RX 250 WO HCPCS: Performed by: INTERNAL MEDICINE

## 2021-01-01 PROCEDURE — 93306 TTE W/DOPPLER COMPLETE: CPT

## 2021-01-01 PROCEDURE — 83735 ASSAY OF MAGNESIUM: CPT

## 2021-01-01 PROCEDURE — 2060000000 HC ICU INTERMEDIATE R&B

## 2021-01-01 PROCEDURE — 85730 THROMBOPLASTIN TIME PARTIAL: CPT

## 2021-01-01 PROCEDURE — 90935 HEMODIALYSIS ONE EVALUATION: CPT | Performed by: INTERNAL MEDICINE

## 2021-01-01 PROCEDURE — 2500000003 HC RX 250 WO HCPCS: Performed by: FAMILY MEDICINE

## 2021-01-01 PROCEDURE — 7100000011 HC PHASE II RECOVERY - ADDTL 15 MIN: Performed by: SURGERY

## 2021-01-01 PROCEDURE — 99214 OFFICE O/P EST MOD 30 MIN: CPT | Performed by: INTERNAL MEDICINE

## 2021-01-01 PROCEDURE — 86850 RBC ANTIBODY SCREEN: CPT

## 2021-01-01 PROCEDURE — 85025 COMPLETE CBC W/AUTO DIFF WBC: CPT

## 2021-01-01 PROCEDURE — 80053 COMPREHEN METABOLIC PANEL: CPT

## 2021-01-01 PROCEDURE — 3017F COLORECTAL CA SCREEN DOC REV: CPT | Performed by: NURSE PRACTITIONER

## 2021-01-01 PROCEDURE — 99214 OFFICE O/P EST MOD 30 MIN: CPT | Performed by: NURSE PRACTITIONER

## 2021-01-01 PROCEDURE — 3700000000 HC ANESTHESIA ATTENDED CARE: Performed by: SURGERY

## 2021-01-01 PROCEDURE — 99214 OFFICE O/P EST MOD 30 MIN: CPT | Performed by: SURGERY

## 2021-01-01 PROCEDURE — 1200000000 HC SEMI PRIVATE

## 2021-01-01 PROCEDURE — 6370000000 HC RX 637 (ALT 250 FOR IP): Performed by: NURSE PRACTITIONER

## 2021-01-01 PROCEDURE — 6370000000 HC RX 637 (ALT 250 FOR IP): Performed by: INTERNAL MEDICINE

## 2021-01-01 PROCEDURE — 93005 ELECTROCARDIOGRAM TRACING: CPT | Performed by: PHYSICIAN ASSISTANT

## 2021-01-01 PROCEDURE — 6360000002 HC RX W HCPCS: Performed by: INTERNAL MEDICINE

## 2021-01-01 PROCEDURE — 6360000002 HC RX W HCPCS: Performed by: FAMILY MEDICINE

## 2021-01-01 PROCEDURE — 84100 ASSAY OF PHOSPHORUS: CPT

## 2021-01-01 PROCEDURE — 93010 ELECTROCARDIOGRAM REPORT: CPT | Performed by: INTERNAL MEDICINE

## 2021-01-01 PROCEDURE — 71045 X-RAY EXAM CHEST 1 VIEW: CPT

## 2021-01-01 PROCEDURE — 6370000000 HC RX 637 (ALT 250 FOR IP): Performed by: CLINICAL NURSE SPECIALIST

## 2021-01-01 PROCEDURE — 93005 ELECTROCARDIOGRAM TRACING: CPT | Performed by: INTERNAL MEDICINE

## 2021-01-01 PROCEDURE — 6370000000 HC RX 637 (ALT 250 FOR IP): Performed by: FAMILY MEDICINE

## 2021-01-01 PROCEDURE — 80048 BASIC METABOLIC PNL TOTAL CA: CPT

## 2021-01-01 PROCEDURE — 1036F TOBACCO NON-USER: CPT | Performed by: PHYSICIAN ASSISTANT

## 2021-01-01 PROCEDURE — 82330 ASSAY OF CALCIUM: CPT

## 2021-01-01 PROCEDURE — 5A1D70Z PERFORMANCE OF URINARY FILTRATION, INTERMITTENT, LESS THAN 6 HOURS PER DAY: ICD-10-PCS | Performed by: FAMILY MEDICINE

## 2021-01-01 PROCEDURE — 74018 RADEX ABDOMEN 1 VIEW: CPT

## 2021-01-01 PROCEDURE — 82962 GLUCOSE BLOOD TEST: CPT

## 2021-01-01 PROCEDURE — 71046 X-RAY EXAM CHEST 2 VIEWS: CPT

## 2021-01-01 PROCEDURE — 99202 OFFICE O/P NEW SF 15 MIN: CPT | Performed by: SURGERY

## 2021-01-01 PROCEDURE — APPSS180 APP SPLIT SHARED TIME > 60 MINUTES: Performed by: NURSE PRACTITIONER

## 2021-01-01 PROCEDURE — 2580000003 HC RX 258: Performed by: FAMILY MEDICINE

## 2021-01-01 PROCEDURE — 83690 ASSAY OF LIPASE: CPT

## 2021-01-01 PROCEDURE — 94003 VENT MGMT INPAT SUBQ DAY: CPT

## 2021-01-01 PROCEDURE — 02HV33Z INSERTION OF INFUSION DEVICE INTO SUPERIOR VENA CAVA, PERCUTANEOUS APPROACH: ICD-10-PCS | Performed by: SURGERY

## 2021-01-01 PROCEDURE — 5A1D70Z PERFORMANCE OF URINARY FILTRATION, INTERMITTENT, LESS THAN 6 HOURS PER DAY: ICD-10-PCS | Performed by: INTERNAL MEDICINE

## 2021-01-01 PROCEDURE — 99283 EMERGENCY DEPT VISIT LOW MDM: CPT

## 2021-01-01 PROCEDURE — 82553 CREATINE MB FRACTION: CPT

## 2021-01-01 PROCEDURE — 2580000003 HC RX 258: Performed by: CHIROPRACTOR

## 2021-01-01 PROCEDURE — 83036 HEMOGLOBIN GLYCOSYLATED A1C: CPT

## 2021-01-01 PROCEDURE — G8420 CALC BMI NORM PARAMETERS: HCPCS | Performed by: SURGERY

## 2021-01-01 PROCEDURE — 99213 OFFICE O/P EST LOW 20 MIN: CPT | Performed by: PHYSICIAN ASSISTANT

## 2021-01-01 PROCEDURE — 93005 ELECTROCARDIOGRAM TRACING: CPT | Performed by: FAMILY MEDICINE

## 2021-01-01 PROCEDURE — 5A1945Z RESPIRATORY VENTILATION, 24-96 CONSECUTIVE HOURS: ICD-10-PCS | Performed by: INTERNAL MEDICINE

## 2021-01-01 PROCEDURE — 90935 HEMODIALYSIS ONE EVALUATION: CPT

## 2021-01-01 PROCEDURE — 87040 BLOOD CULTURE FOR BACTERIA: CPT

## 2021-01-01 PROCEDURE — 99231 SBSQ HOSP IP/OBS SF/LOW 25: CPT | Performed by: NURSE PRACTITIONER

## 2021-01-01 PROCEDURE — 3017F COLORECTAL CA SCREEN DOC REV: CPT | Performed by: SURGERY

## 2021-01-01 PROCEDURE — 93005 ELECTROCARDIOGRAM TRACING: CPT | Performed by: EMERGENCY MEDICINE

## 2021-01-01 PROCEDURE — 86140 C-REACTIVE PROTEIN: CPT

## 2021-01-01 PROCEDURE — 90945 DIALYSIS ONE EVALUATION: CPT

## 2021-01-01 PROCEDURE — 99284 EMERGENCY DEPT VISIT MOD MDM: CPT

## 2021-01-01 PROCEDURE — 2500000003 HC RX 250 WO HCPCS: Performed by: NURSE ANESTHETIST, CERTIFIED REGISTERED

## 2021-01-01 PROCEDURE — P9047 ALBUMIN (HUMAN), 25%, 50ML: HCPCS | Performed by: INTERNAL MEDICINE

## 2021-01-01 PROCEDURE — 45385 COLONOSCOPY W/LESION REMOVAL: CPT | Performed by: SURGERY

## 2021-01-01 PROCEDURE — 96374 THER/PROPH/DIAG INJ IV PUSH: CPT

## 2021-01-01 PROCEDURE — G8417 CALC BMI ABV UP PARAM F/U: HCPCS | Performed by: INTERNAL MEDICINE

## 2021-01-01 PROCEDURE — 99212 OFFICE O/P EST SF 10 MIN: CPT | Performed by: NURSE PRACTITIONER

## 2021-01-01 PROCEDURE — 93000 ELECTROCARDIOGRAM COMPLETE: CPT | Performed by: INTERNAL MEDICINE

## 2021-01-01 PROCEDURE — 84443 ASSAY THYROID STIM HORMONE: CPT

## 2021-01-01 PROCEDURE — G8484 FLU IMMUNIZE NO ADMIN: HCPCS | Performed by: NURSE PRACTITIONER

## 2021-01-01 PROCEDURE — 2580000003 HC RX 258: Performed by: INTERNAL MEDICINE

## 2021-01-01 PROCEDURE — 84145 PROCALCITONIN (PCT): CPT

## 2021-01-01 PROCEDURE — 3017F COLORECTAL CA SCREEN DOC REV: CPT | Performed by: INTERNAL MEDICINE

## 2021-01-01 PROCEDURE — 83605 ASSAY OF LACTIC ACID: CPT

## 2021-01-01 PROCEDURE — 94002 VENT MGMT INPAT INIT DAY: CPT

## 2021-01-01 PROCEDURE — 84439 ASSAY OF FREE THYROXINE: CPT

## 2021-01-01 PROCEDURE — 99254 IP/OBS CNSLTJ NEW/EST MOD 60: CPT | Performed by: STUDENT IN AN ORGANIZED HEALTH CARE EDUCATION/TRAINING PROGRAM

## 2021-01-01 PROCEDURE — 6370000000 HC RX 637 (ALT 250 FOR IP): Performed by: PHYSICIAN ASSISTANT

## 2021-01-01 PROCEDURE — 80076 HEPATIC FUNCTION PANEL: CPT

## 2021-01-01 PROCEDURE — 73610 X-RAY EXAM OF ANKLE: CPT

## 2021-01-01 PROCEDURE — 1111F DSCHRG MED/CURRENT MED MERGE: CPT | Performed by: NURSE PRACTITIONER

## 2021-01-01 PROCEDURE — G8417 CALC BMI ABV UP PARAM F/U: HCPCS | Performed by: SURGERY

## 2021-01-01 PROCEDURE — 82550 ASSAY OF CK (CPK): CPT

## 2021-01-01 PROCEDURE — 87186 SC STD MICRODIL/AGAR DIL: CPT

## 2021-01-01 PROCEDURE — G8417 CALC BMI ABV UP PARAM F/U: HCPCS | Performed by: PHYSICIAN ASSISTANT

## 2021-01-01 PROCEDURE — 1036F TOBACCO NON-USER: CPT | Performed by: INTERNAL MEDICINE

## 2021-01-01 PROCEDURE — 6360000002 HC RX W HCPCS

## 2021-01-01 PROCEDURE — 82306 VITAMIN D 25 HYDROXY: CPT

## 2021-01-01 PROCEDURE — APPSS60 APP SPLIT SHARED TIME 46-60 MINUTES: Performed by: CLINICAL NURSE SPECIALIST

## 2021-01-01 PROCEDURE — 36600 WITHDRAWAL OF ARTERIAL BLOOD: CPT

## 2021-01-01 PROCEDURE — 02HV33Z INSERTION OF INFUSION DEVICE INTO SUPERIOR VENA CAVA, PERCUTANEOUS APPROACH: ICD-10-PCS | Performed by: FAMILY MEDICINE

## 2021-01-01 PROCEDURE — 85027 COMPLETE CBC AUTOMATED: CPT

## 2021-01-01 PROCEDURE — P9059 PLASMA, FRZ BETWEEN 8-24HOUR: HCPCS

## 2021-01-01 PROCEDURE — 3700000001 HC ADD 15 MINUTES (ANESTHESIA): Performed by: SURGERY

## 2021-01-01 PROCEDURE — 87081 CULTURE SCREEN ONLY: CPT

## 2021-01-01 PROCEDURE — 70450 CT HEAD/BRAIN W/O DYE: CPT

## 2021-01-01 PROCEDURE — 73060 X-RAY EXAM OF HUMERUS: CPT

## 2021-01-01 PROCEDURE — 99291 CRITICAL CARE FIRST HOUR: CPT | Performed by: INTERNAL MEDICINE

## 2021-01-01 PROCEDURE — 87077 CULTURE AEROBIC IDENTIFY: CPT

## 2021-01-01 PROCEDURE — 2500000003 HC RX 250 WO HCPCS: Performed by: STUDENT IN AN ORGANIZED HEALTH CARE EDUCATION/TRAINING PROGRAM

## 2021-01-01 PROCEDURE — 3609010600 HC COLONOSCOPY POLYPECTOMY SNARE/COLD BIOPSY: Performed by: SURGERY

## 2021-01-01 PROCEDURE — 36430 TRANSFUSION BLD/BLD COMPNT: CPT

## 2021-01-01 PROCEDURE — 1036F TOBACCO NON-USER: CPT | Performed by: SURGERY

## 2021-01-01 PROCEDURE — 73030 X-RAY EXAM OF SHOULDER: CPT

## 2021-01-01 PROCEDURE — 2000000000 HC ICU R&B

## 2021-01-01 PROCEDURE — 93005 ELECTROCARDIOGRAM TRACING: CPT | Performed by: STUDENT IN AN ORGANIZED HEALTH CARE EDUCATION/TRAINING PROGRAM

## 2021-01-01 PROCEDURE — 76700 US EXAM ABDOM COMPLETE: CPT

## 2021-01-01 PROCEDURE — G8427 DOCREV CUR MEDS BY ELIG CLIN: HCPCS | Performed by: NURSE PRACTITIONER

## 2021-01-01 PROCEDURE — 2709999900 HC NON-CHARGEABLE SUPPLY: Performed by: SURGERY

## 2021-01-01 PROCEDURE — G8427 DOCREV CUR MEDS BY ELIG CLIN: HCPCS | Performed by: SURGERY

## 2021-01-01 PROCEDURE — 99223 1ST HOSP IP/OBS HIGH 75: CPT | Performed by: SURGERY

## 2021-01-01 PROCEDURE — 37799 UNLISTED PX VASCULAR SURGERY: CPT

## 2021-01-01 PROCEDURE — 80061 LIPID PANEL: CPT

## 2021-01-01 PROCEDURE — 1036F TOBACCO NON-USER: CPT | Performed by: NURSE PRACTITIONER

## 2021-01-01 PROCEDURE — 3017F COLORECTAL CA SCREEN DOC REV: CPT | Performed by: PHYSICIAN ASSISTANT

## 2021-01-01 PROCEDURE — 36620 INSERTION CATHETER ARTERY: CPT | Performed by: INTERNAL MEDICINE

## 2021-01-01 PROCEDURE — 99255 IP/OBS CONSLTJ NEW/EST HI 80: CPT | Performed by: INTERNAL MEDICINE

## 2021-01-01 PROCEDURE — G8427 DOCREV CUR MEDS BY ELIG CLIN: HCPCS | Performed by: INTERNAL MEDICINE

## 2021-01-01 PROCEDURE — 99212 OFFICE O/P EST SF 10 MIN: CPT | Performed by: SURGERY

## 2021-01-01 PROCEDURE — 6360000004 HC RX CONTRAST MEDICATION: Performed by: RADIOLOGY

## 2021-01-01 PROCEDURE — 85384 FIBRINOGEN ACTIVITY: CPT

## 2021-01-01 PROCEDURE — 74176 CT ABD & PELVIS W/O CONTRAST: CPT

## 2021-01-01 PROCEDURE — 82164 ANGIOTENSIN I ENZYME TEST: CPT

## 2021-01-01 PROCEDURE — 3609010300 HC COLONOSCOPY W/BIOPSY SINGLE/MULTIPLE: Performed by: SURGERY

## 2021-01-01 PROCEDURE — 99222 1ST HOSP IP/OBS MODERATE 55: CPT | Performed by: NURSE PRACTITIONER

## 2021-01-01 PROCEDURE — 83880 ASSAY OF NATRIURETIC PEPTIDE: CPT

## 2021-01-01 PROCEDURE — 86901 BLOOD TYPING SEROLOGIC RH(D): CPT

## 2021-01-01 PROCEDURE — 2500000003 HC RX 250 WO HCPCS

## 2021-01-01 PROCEDURE — C9113 INJ PANTOPRAZOLE SODIUM, VIA: HCPCS | Performed by: INTERNAL MEDICINE

## 2021-01-01 PROCEDURE — 6370000000 HC RX 637 (ALT 250 FOR IP): Performed by: STUDENT IN AN ORGANIZED HEALTH CARE EDUCATION/TRAINING PROGRAM

## 2021-01-01 PROCEDURE — 7100000010 HC PHASE II RECOVERY - FIRST 15 MIN: Performed by: SURGERY

## 2021-01-01 PROCEDURE — G8427 DOCREV CUR MEDS BY ELIG CLIN: HCPCS | Performed by: PHYSICIAN ASSISTANT

## 2021-01-01 PROCEDURE — 97161 PT EVAL LOW COMPLEX 20 MIN: CPT | Performed by: PHYSICAL THERAPIST

## 2021-01-01 PROCEDURE — P9073 PLATELETS PHERESIS PATH REDU: HCPCS

## 2021-01-01 PROCEDURE — 76705 ECHO EXAM OF ABDOMEN: CPT

## 2021-01-01 PROCEDURE — 2580000003 HC RX 258: Performed by: SURGERY

## 2021-01-01 PROCEDURE — 88305 TISSUE EXAM BY PATHOLOGIST: CPT

## 2021-01-01 PROCEDURE — 36556 INSERT NON-TUNNEL CV CATH: CPT | Performed by: INTERNAL MEDICINE

## 2021-01-01 PROCEDURE — 83540 ASSAY OF IRON: CPT

## 2021-01-01 PROCEDURE — 31500 INSERT EMERGENCY AIRWAY: CPT | Performed by: INTERNAL MEDICINE

## 2021-01-01 PROCEDURE — 82140 ASSAY OF AMMONIA: CPT

## 2021-01-01 PROCEDURE — 99285 EMERGENCY DEPT VISIT HI MDM: CPT

## 2021-01-01 PROCEDURE — 31500 INSERT EMERGENCY AIRWAY: CPT

## 2021-01-01 PROCEDURE — 86900 BLOOD TYPING SEROLOGIC ABO: CPT

## 2021-01-01 PROCEDURE — 87070 CULTURE OTHR SPECIMN AEROBIC: CPT

## 2021-01-01 PROCEDURE — 71275 CT ANGIOGRAPHY CHEST: CPT

## 2021-01-01 PROCEDURE — G8417 CALC BMI ABV UP PARAM F/U: HCPCS | Performed by: NURSE PRACTITIONER

## 2021-01-01 PROCEDURE — 36620 INSERTION CATHETER ARTERY: CPT

## 2021-01-01 PROCEDURE — 83970 ASSAY OF PARATHORMONE: CPT

## 2021-01-01 PROCEDURE — 93971 EXTREMITY STUDY: CPT

## 2021-01-01 PROCEDURE — 87150 DNA/RNA AMPLIFIED PROBE: CPT

## 2021-01-01 PROCEDURE — 97530 THERAPEUTIC ACTIVITIES: CPT | Performed by: PHYSICAL THERAPIST

## 2021-01-01 PROCEDURE — 2580000003 HC RX 258

## 2021-01-01 PROCEDURE — 0202U NFCT DS 22 TRGT SARS-COV-2: CPT

## 2021-01-01 PROCEDURE — 0BH18EZ INSERTION OF ENDOTRACHEAL AIRWAY INTO TRACHEA, VIA NATURAL OR ARTIFICIAL OPENING ENDOSCOPIC: ICD-10-PCS | Performed by: INTERNAL MEDICINE

## 2021-01-01 PROCEDURE — 87206 SMEAR FLUORESCENT/ACID STAI: CPT

## 2021-01-01 PROCEDURE — 99254 IP/OBS CNSLTJ NEW/EST MOD 60: CPT | Performed by: INTERNAL MEDICINE

## 2021-01-01 PROCEDURE — 2580000003 HC RX 258: Performed by: RADIOLOGY

## 2021-01-01 RX ORDER — SODIUM CHLORIDE, SODIUM LACTATE, POTASSIUM CHLORIDE, CALCIUM CHLORIDE 600; 310; 30; 20 MG/100ML; MG/100ML; MG/100ML; MG/100ML
INJECTION, SOLUTION INTRAVENOUS CONTINUOUS
Status: CANCELLED | OUTPATIENT
Start: 2021-01-01

## 2021-01-01 RX ORDER — SODIUM CHLORIDE 0.9 % (FLUSH) 0.9 %
10 SYRINGE (ML) INJECTION PRN
Status: DISCONTINUED | OUTPATIENT
Start: 2021-01-01 | End: 2021-01-01 | Stop reason: HOSPADM

## 2021-01-01 RX ORDER — ISOSORBIDE MONONITRATE 30 MG/1
30 TABLET, EXTENDED RELEASE ORAL DAILY
Qty: 30 TABLET | Refills: 3 | Status: SHIPPED
Start: 2021-01-01 | End: 2021-01-01

## 2021-01-01 RX ORDER — ANTICOAGULANT SODIUM CITRATE SOLUTION 4 G/100ML
SOLUTION INTRAVENOUS CONTINUOUS
Status: DISCONTINUED | OUTPATIENT
Start: 2021-01-01 | End: 2021-08-13 | Stop reason: HOSPADM

## 2021-01-01 RX ORDER — LEVOFLOXACIN 5 MG/ML
750 INJECTION, SOLUTION INTRAVENOUS ONCE
Status: DISCONTINUED | OUTPATIENT
Start: 2021-01-01 | End: 2021-01-01

## 2021-01-01 RX ORDER — SODIUM CHLORIDE 9 MG/ML
25 INJECTION, SOLUTION INTRAVENOUS PRN
Status: DISCONTINUED | OUTPATIENT
Start: 2021-01-01 | End: 2021-08-13 | Stop reason: HOSPADM

## 2021-01-01 RX ORDER — ROCURONIUM BROMIDE 10 MG/ML
INJECTION, SOLUTION INTRAVENOUS
Status: DISPENSED
Start: 2021-01-01 | End: 2021-01-01

## 2021-01-01 RX ORDER — MIDODRINE HYDROCHLORIDE 10 MG/1
10 TABLET ORAL
Status: COMPLETED | OUTPATIENT
Start: 2021-01-01 | End: 2021-01-01

## 2021-01-01 RX ORDER — ONDANSETRON 4 MG/1
4 TABLET, ORALLY DISINTEGRATING ORAL EVERY 8 HOURS PRN
Status: DISCONTINUED | OUTPATIENT
Start: 2021-01-01 | End: 2021-01-01

## 2021-01-01 RX ORDER — LISINOPRIL 10 MG/1
5 TABLET ORAL DAILY
Status: DISCONTINUED | OUTPATIENT
Start: 2021-01-01 | End: 2021-01-01

## 2021-01-01 RX ORDER — ISOSORBIDE MONONITRATE 30 MG/1
30 TABLET, EXTENDED RELEASE ORAL DAILY
Status: DISCONTINUED | OUTPATIENT
Start: 2021-01-01 | End: 2021-01-01 | Stop reason: HOSPADM

## 2021-01-01 RX ORDER — SODIUM CHLORIDE 9 MG/ML
25 INJECTION, SOLUTION INTRAVENOUS PRN
Status: DISCONTINUED | OUTPATIENT
Start: 2021-01-01 | End: 2021-01-01 | Stop reason: HOSPADM

## 2021-01-01 RX ORDER — DEXTROSE MONOHYDRATE 50 MG/ML
100 INJECTION, SOLUTION INTRAVENOUS PRN
Status: DISCONTINUED | OUTPATIENT
Start: 2021-01-01 | End: 2021-01-01 | Stop reason: SDUPTHER

## 2021-01-01 RX ORDER — DEXTROSE MONOHYDRATE 25 G/50ML
12.5 INJECTION, SOLUTION INTRAVENOUS PRN
Status: DISCONTINUED | OUTPATIENT
Start: 2021-01-01 | End: 2021-08-13 | Stop reason: HOSPADM

## 2021-01-01 RX ORDER — EPINEPHRINE 1 MG/ML
INJECTION, SOLUTION, CONCENTRATE INTRAVENOUS
Status: COMPLETED
Start: 2021-01-01 | End: 2021-01-01

## 2021-01-01 RX ORDER — DIPHENHYDRAMINE HYDROCHLORIDE 50 MG/ML
25 INJECTION INTRAMUSCULAR; INTRAVENOUS
Status: COMPLETED | OUTPATIENT
Start: 2021-01-01 | End: 2021-01-01

## 2021-01-01 RX ORDER — EPINEPHRINE 1 MG/ML
INJECTION, SOLUTION, CONCENTRATE INTRAVENOUS
Status: DISCONTINUED
Start: 2021-01-01 | End: 2021-08-13 | Stop reason: HOSPADM

## 2021-01-01 RX ORDER — DEXTROSE MONOHYDRATE 25 G/50ML
25 INJECTION, SOLUTION INTRAVENOUS ONCE
Status: COMPLETED | OUTPATIENT
Start: 2021-01-01 | End: 2021-01-01

## 2021-01-01 RX ORDER — CINACALCET 30 MG/1
30 TABLET, FILM COATED ORAL DAILY
Status: DISCONTINUED | OUTPATIENT
Start: 2021-01-01 | End: 2021-08-13 | Stop reason: HOSPADM

## 2021-01-01 RX ORDER — NICOTINE POLACRILEX 4 MG
15 LOZENGE BUCCAL PRN
Status: DISCONTINUED | OUTPATIENT
Start: 2021-01-01 | End: 2021-01-01 | Stop reason: SDUPTHER

## 2021-01-01 RX ORDER — LANOLIN ALCOHOL/MO/W.PET/CERES
3 CREAM (GRAM) TOPICAL NIGHTLY PRN
Status: DISCONTINUED | OUTPATIENT
Start: 2021-01-01 | End: 2021-08-13 | Stop reason: HOSPADM

## 2021-01-01 RX ORDER — LISINOPRIL 10 MG/1
10 TABLET ORAL DAILY
Qty: 30 TABLET | Refills: 3 | Status: SHIPPED
Start: 2021-01-01 | End: 2021-01-01

## 2021-01-01 RX ORDER — ALBUMIN (HUMAN) 12.5 G/50ML
25 SOLUTION INTRAVENOUS EVERY 8 HOURS
Status: COMPLETED | OUTPATIENT
Start: 2021-01-01 | End: 2021-01-01

## 2021-01-01 RX ORDER — HYDRALAZINE HYDROCHLORIDE 20 MG/ML
20 INJECTION INTRAMUSCULAR; INTRAVENOUS ONCE
Status: COMPLETED | OUTPATIENT
Start: 2021-01-01 | End: 2021-01-01

## 2021-01-01 RX ORDER — SODIUM CHLORIDE 9 MG/ML
25 INJECTION, SOLUTION INTRAVENOUS PRN
Status: CANCELLED | OUTPATIENT
Start: 2021-01-01

## 2021-01-01 RX ORDER — SEVELAMER CARBONATE 800 MG/1
2 TABLET, FILM COATED ORAL
COMMUNITY

## 2021-01-01 RX ORDER — OXYMETAZOLINE HYDROCHLORIDE 0.05 G/100ML
2 SPRAY NASAL ONCE
Status: COMPLETED | OUTPATIENT
Start: 2021-01-01 | End: 2021-01-01

## 2021-01-01 RX ORDER — METOPROLOL SUCCINATE 25 MG/1
50 TABLET, EXTENDED RELEASE ORAL 2 TIMES DAILY
Status: DISCONTINUED | OUTPATIENT
Start: 2021-01-01 | End: 2021-01-01 | Stop reason: HOSPADM

## 2021-01-01 RX ORDER — SODIUM CHLORIDE 0.9 % (FLUSH) 0.9 %
10 SYRINGE (ML) INJECTION PRN
Status: CANCELLED | OUTPATIENT
Start: 2021-01-01

## 2021-01-01 RX ORDER — ACETAMINOPHEN 650 MG/1
650 SUPPOSITORY RECTAL EVERY 6 HOURS PRN
Status: DISCONTINUED | OUTPATIENT
Start: 2021-01-01 | End: 2021-01-01 | Stop reason: HOSPADM

## 2021-01-01 RX ORDER — LIDOCAINE AND PRILOCAINE 25; 25 MG/G; MG/G
1 CREAM TOPICAL PRN
COMMUNITY

## 2021-01-01 RX ORDER — HYDRALAZINE HYDROCHLORIDE 25 MG/1
75 TABLET, FILM COATED ORAL EVERY 8 HOURS SCHEDULED
Status: DISCONTINUED | OUTPATIENT
Start: 2021-01-01 | End: 2021-08-13 | Stop reason: HOSPADM

## 2021-01-01 RX ORDER — DEXTROSE MONOHYDRATE 25 G/50ML
25 INJECTION, SOLUTION INTRAVENOUS ONCE
Status: DISCONTINUED | OUTPATIENT
Start: 2021-01-01 | End: 2021-08-13 | Stop reason: HOSPADM

## 2021-01-01 RX ORDER — HYDRALAZINE HYDROCHLORIDE 50 MG/1
50 TABLET, FILM COATED ORAL EVERY 8 HOURS SCHEDULED
Status: DISCONTINUED | OUTPATIENT
Start: 2021-01-01 | End: 2021-01-01

## 2021-01-01 RX ORDER — DRONABINOL 5 MG/1
CAPSULE ORAL
COMMUNITY
Start: 2020-01-01 | End: 2021-01-01

## 2021-01-01 RX ORDER — DEXTROSE MONOHYDRATE 25 G/50ML
25 INJECTION, SOLUTION INTRAVENOUS PRN
Status: DISCONTINUED | OUTPATIENT
Start: 2021-01-01 | End: 2021-01-01

## 2021-01-01 RX ORDER — ONDANSETRON 2 MG/ML
4 INJECTION INTRAMUSCULAR; INTRAVENOUS EVERY 6 HOURS PRN
Status: DISCONTINUED | OUTPATIENT
Start: 2021-01-01 | End: 2021-01-01

## 2021-01-01 RX ORDER — SODIUM CHLORIDE 0.9 % (FLUSH) 0.9 %
10 SYRINGE (ML) INJECTION EVERY 12 HOURS SCHEDULED
Status: DISCONTINUED | OUTPATIENT
Start: 2021-01-01 | End: 2021-01-01 | Stop reason: HOSPADM

## 2021-01-01 RX ORDER — SODIUM CHLORIDE 9 MG/ML
10 INJECTION INTRAVENOUS 2 TIMES DAILY
Status: DISCONTINUED | OUTPATIENT
Start: 2021-01-01 | End: 2021-08-13 | Stop reason: HOSPADM

## 2021-01-01 RX ORDER — 0.9 % SODIUM CHLORIDE 0.9 %
1000 INTRAVENOUS SOLUTION INTRAVENOUS ONCE
Status: COMPLETED | OUTPATIENT
Start: 2021-01-01 | End: 2021-01-01

## 2021-01-01 RX ORDER — HEPARIN SODIUM 1000 [USP'U]/ML
INJECTION, SOLUTION INTRAVENOUS; SUBCUTANEOUS
Status: DISCONTINUED
Start: 2021-01-01 | End: 2021-08-13 | Stop reason: HOSPADM

## 2021-01-01 RX ORDER — POTASSIUM CHLORIDE 20 MEQ/1
20 TABLET, EXTENDED RELEASE ORAL ONCE
Status: COMPLETED | OUTPATIENT
Start: 2021-01-01 | End: 2021-01-01

## 2021-01-01 RX ORDER — ACETAMINOPHEN 325 MG/1
650 TABLET ORAL EVERY 6 HOURS PRN
Status: DISCONTINUED | OUTPATIENT
Start: 2021-01-01 | End: 2021-01-01 | Stop reason: HOSPADM

## 2021-01-01 RX ORDER — ACETAMINOPHEN 500 MG
1000 TABLET ORAL ONCE
Status: CANCELLED | OUTPATIENT
Start: 2021-01-01 | End: 2021-01-01

## 2021-01-01 RX ORDER — BUMETANIDE 0.25 MG/ML
1 INJECTION, SOLUTION INTRAMUSCULAR; INTRAVENOUS EVERY 12 HOURS
Status: DISCONTINUED | OUTPATIENT
Start: 2021-01-01 | End: 2021-01-01

## 2021-01-01 RX ORDER — DEXTROSE MONOHYDRATE 25 G/50ML
INJECTION, SOLUTION INTRAVENOUS
Status: COMPLETED
Start: 2021-01-01 | End: 2021-01-01

## 2021-01-01 RX ORDER — LISINOPRIL 10 MG/1
10 TABLET ORAL DAILY
Qty: 30 TABLET | Refills: 3 | Status: SHIPPED | OUTPATIENT
Start: 2021-01-01 | End: 2021-01-01

## 2021-01-01 RX ORDER — HYDRALAZINE HYDROCHLORIDE 25 MG/1
75 TABLET, FILM COATED ORAL EVERY 8 HOURS SCHEDULED
Qty: 90 TABLET | Refills: 3 | Status: SHIPPED | OUTPATIENT
Start: 2021-01-01 | End: 2021-01-01

## 2021-01-01 RX ORDER — HEPARIN SODIUM 10000 [USP'U]/ML
5000 INJECTION, SOLUTION INTRAVENOUS; SUBCUTANEOUS EVERY 8 HOURS SCHEDULED
Status: DISCONTINUED | OUTPATIENT
Start: 2021-01-01 | End: 2021-01-01 | Stop reason: HOSPADM

## 2021-01-01 RX ORDER — SODIUM CHLORIDE 0.9 % (FLUSH) 0.9 %
10 SYRINGE (ML) INJECTION EVERY 12 HOURS SCHEDULED
Status: CANCELLED | OUTPATIENT
Start: 2021-01-01

## 2021-01-01 RX ORDER — LISINOPRIL 10 MG/1
10 TABLET ORAL DAILY
Qty: 30 TABLET | Refills: 3 | Status: SHIPPED | OUTPATIENT
Start: 2021-01-01 | End: 2021-01-01 | Stop reason: ALTCHOICE

## 2021-01-01 RX ORDER — ISOSORBIDE MONONITRATE 30 MG/1
30 TABLET, EXTENDED RELEASE ORAL DAILY
Status: DISCONTINUED | OUTPATIENT
Start: 2021-01-01 | End: 2021-08-13 | Stop reason: HOSPADM

## 2021-01-01 RX ORDER — NICOTINE POLACRILEX 4 MG
15 LOZENGE BUCCAL PRN
Status: DISCONTINUED | OUTPATIENT
Start: 2021-01-01 | End: 2021-08-13 | Stop reason: HOSPADM

## 2021-01-01 RX ORDER — DEXTROSE MONOHYDRATE 50 MG/ML
INJECTION, SOLUTION INTRAVENOUS
Status: COMPLETED
Start: 2021-01-01 | End: 2021-01-01

## 2021-01-01 RX ORDER — ACETAMINOPHEN 325 MG/1
650 TABLET ORAL EVERY 6 HOURS PRN
Status: DISCONTINUED | OUTPATIENT
Start: 2021-01-01 | End: 2021-08-13 | Stop reason: HOSPADM

## 2021-01-01 RX ORDER — HEPARIN SODIUM 1000 [USP'U]/ML
INJECTION, SOLUTION INTRAVENOUS; SUBCUTANEOUS
Status: DISCONTINUED
Start: 2021-01-01 | End: 2021-01-01 | Stop reason: WASHOUT

## 2021-01-01 RX ORDER — SODIUM CHLORIDE, SODIUM LACTATE, POTASSIUM CHLORIDE, CALCIUM CHLORIDE 600; 310; 30; 20 MG/100ML; MG/100ML; MG/100ML; MG/100ML
INJECTION, SOLUTION INTRAVENOUS CONTINUOUS
Status: DISCONTINUED | OUTPATIENT
Start: 2021-01-01 | End: 2021-01-01 | Stop reason: HOSPADM

## 2021-01-01 RX ORDER — ACETAMINOPHEN 650 MG/1
650 SUPPOSITORY RECTAL EVERY 6 HOURS PRN
Status: DISCONTINUED | OUTPATIENT
Start: 2021-01-01 | End: 2021-08-13 | Stop reason: HOSPADM

## 2021-01-01 RX ORDER — POTASSIUM CHLORIDE 29.8 MG/ML
20 INJECTION INTRAVENOUS PRN
Status: DISCONTINUED | OUTPATIENT
Start: 2021-01-01 | End: 2021-08-13 | Stop reason: HOSPADM

## 2021-01-01 RX ORDER — ETOMIDATE 2 MG/ML
INJECTION INTRAVENOUS
Status: COMPLETED
Start: 2021-01-01 | End: 2021-01-01

## 2021-01-01 RX ORDER — ISOSORBIDE MONONITRATE 30 MG/1
30 TABLET, EXTENDED RELEASE ORAL DAILY
Qty: 30 TABLET | Refills: 3 | Status: SHIPPED | OUTPATIENT
Start: 2021-01-01

## 2021-01-01 RX ORDER — SODIUM CHLORIDE 0.9 % (FLUSH) 0.9 %
5-40 SYRINGE (ML) INJECTION EVERY 12 HOURS SCHEDULED
Status: DISCONTINUED | OUTPATIENT
Start: 2021-01-01 | End: 2021-08-13 | Stop reason: HOSPADM

## 2021-01-01 RX ORDER — ALBUMIN (HUMAN) 12.5 G/50ML
25 SOLUTION INTRAVENOUS EVERY 6 HOURS
Status: DISCONTINUED | OUTPATIENT
Start: 2021-01-01 | End: 2021-08-13 | Stop reason: HOSPADM

## 2021-01-01 RX ORDER — CHLORHEXIDINE GLUCONATE 0.12 MG/ML
15 RINSE ORAL 2 TIMES DAILY
Status: DISCONTINUED | OUTPATIENT
Start: 2021-01-01 | End: 2021-08-13 | Stop reason: HOSPADM

## 2021-01-01 RX ORDER — SODIUM CHLORIDE 0.9 % (FLUSH) 0.9 %
10 SYRINGE (ML) INJECTION ONCE
Status: COMPLETED | OUTPATIENT
Start: 2021-01-01 | End: 2021-01-01

## 2021-01-01 RX ORDER — PROPOFOL 10 MG/ML
INJECTION, EMULSION INTRAVENOUS
Status: DISCONTINUED
Start: 2021-01-01 | End: 2021-01-01 | Stop reason: WASHOUT

## 2021-01-01 RX ORDER — VASOPRESSIN 20 U/ML
INJECTION PARENTERAL
Status: DISPENSED
Start: 2021-01-01 | End: 2021-01-01

## 2021-01-01 RX ORDER — LISINOPRIL 10 MG/1
10 TABLET ORAL DAILY
Status: DISCONTINUED | OUTPATIENT
Start: 2021-01-01 | End: 2021-01-01 | Stop reason: HOSPADM

## 2021-01-01 RX ORDER — PROMETHAZINE HYDROCHLORIDE 25 MG/1
12.5 TABLET ORAL EVERY 6 HOURS PRN
Status: DISCONTINUED | OUTPATIENT
Start: 2021-01-01 | End: 2021-01-01 | Stop reason: HOSPADM

## 2021-01-01 RX ORDER — PANTOPRAZOLE SODIUM 40 MG/10ML
40 INJECTION, POWDER, LYOPHILIZED, FOR SOLUTION INTRAVENOUS 2 TIMES DAILY
Status: DISCONTINUED | OUTPATIENT
Start: 2021-01-01 | End: 2021-08-13 | Stop reason: HOSPADM

## 2021-01-01 RX ORDER — HYDROCODONE BITARTRATE AND ACETAMINOPHEN 5; 325 MG/1; MG/1
1 TABLET ORAL EVERY 6 HOURS PRN
Status: DISCONTINUED | OUTPATIENT
Start: 2021-01-01 | End: 2021-08-13 | Stop reason: HOSPADM

## 2021-01-01 RX ORDER — SODIUM CHLORIDE 0.9 % (FLUSH) 0.9 %
5-40 SYRINGE (ML) INJECTION PRN
Status: DISCONTINUED | OUTPATIENT
Start: 2021-01-01 | End: 2021-08-13 | Stop reason: HOSPADM

## 2021-01-01 RX ORDER — DOBUTAMINE HYDROCHLORIDE 400 MG/100ML
2-40 INJECTION INTRAVENOUS CONTINUOUS
Status: CANCELLED | OUTPATIENT
Start: 2021-01-01

## 2021-01-01 RX ORDER — SODIUM CHLORIDE 9 MG/ML
INJECTION, SOLUTION INTRAVENOUS PRN
Status: DISCONTINUED | OUTPATIENT
Start: 2021-01-01 | End: 2021-01-01

## 2021-01-01 RX ORDER — SENNA AND DOCUSATE SODIUM 50; 8.6 MG/1; MG/1
2 TABLET, FILM COATED ORAL DAILY
Status: DISCONTINUED | OUTPATIENT
Start: 2021-01-01 | End: 2021-08-13 | Stop reason: HOSPADM

## 2021-01-01 RX ORDER — ISOSORBIDE MONONITRATE 30 MG/1
30 TABLET, EXTENDED RELEASE ORAL DAILY
Qty: 30 TABLET | Refills: 3 | Status: SHIPPED | OUTPATIENT
Start: 2021-01-01 | End: 2021-01-01

## 2021-01-01 RX ORDER — POLYETHYLENE GLYCOL 3350, SODIUM CHLORIDE, SODIUM BICARBONATE, POTASSIUM CHLORIDE 420; 11.2; 5.72; 1.48 G/4L; G/4L; G/4L; G/4L
POWDER, FOR SOLUTION ORAL
Qty: 1 BOTTLE | Refills: 0 | Status: SHIPPED | OUTPATIENT
Start: 2021-01-01 | End: 2021-01-01

## 2021-01-01 RX ORDER — METOPROLOL SUCCINATE 50 MG/1
50 TABLET, EXTENDED RELEASE ORAL DAILY
Qty: 30 TABLET | Refills: 0 | Status: SHIPPED | OUTPATIENT
Start: 2021-01-01 | End: 2021-01-01

## 2021-01-01 RX ORDER — NITROGLYCERIN 0.4 MG/1
0.4 TABLET SUBLINGUAL EVERY 5 MIN PRN
Status: DISCONTINUED | OUTPATIENT
Start: 2021-01-01 | End: 2021-01-01 | Stop reason: HOSPADM

## 2021-01-01 RX ORDER — DEXTROSE MONOHYDRATE 25 G/50ML
12.5 INJECTION, SOLUTION INTRAVENOUS PRN
Status: DISCONTINUED | OUTPATIENT
Start: 2021-01-01 | End: 2021-01-01 | Stop reason: SDUPTHER

## 2021-01-01 RX ORDER — POLYETHYLENE GLYCOL 3350 17 G/17G
17 POWDER, FOR SOLUTION ORAL DAILY PRN
Status: DISCONTINUED | OUTPATIENT
Start: 2021-01-01 | End: 2021-01-01 | Stop reason: HOSPADM

## 2021-01-01 RX ORDER — MIDAZOLAM HYDROCHLORIDE 1 MG/ML
INJECTION INTRAMUSCULAR; INTRAVENOUS
Status: COMPLETED
Start: 2021-01-01 | End: 2021-01-01

## 2021-01-01 RX ORDER — DEXTROSE MONOHYDRATE 100 MG/ML
INJECTION, SOLUTION INTRAVENOUS CONTINUOUS
Status: DISCONTINUED | OUTPATIENT
Start: 2021-01-01 | End: 2021-01-01

## 2021-01-01 RX ORDER — LISINOPRIL 5 MG/1
TABLET ORAL
COMMUNITY
Start: 2021-01-01 | End: 2021-01-01 | Stop reason: CLARIF

## 2021-01-01 RX ORDER — PROPOFOL 10 MG/ML
INJECTION, EMULSION INTRAVENOUS PRN
Status: DISCONTINUED | OUTPATIENT
Start: 2021-01-01 | End: 2021-01-01 | Stop reason: SDUPTHER

## 2021-01-01 RX ORDER — METOPROLOL TARTRATE 50 MG/1
50 TABLET, FILM COATED ORAL 2 TIMES DAILY
Qty: 60 TABLET | Refills: 3 | Status: SHIPPED | OUTPATIENT
Start: 2021-01-01

## 2021-01-01 RX ORDER — CALCIUM ACETATE 667 MG/1
1334 CAPSULE ORAL
Status: DISCONTINUED | OUTPATIENT
Start: 2021-01-01 | End: 2021-01-01 | Stop reason: HOSPADM

## 2021-01-01 RX ORDER — POLYETHYLENE GLYCOL 3350 17 G/17G
17 POWDER, FOR SOLUTION ORAL DAILY PRN
Status: DISCONTINUED | OUTPATIENT
Start: 2021-01-01 | End: 2021-08-13 | Stop reason: HOSPADM

## 2021-01-01 RX ORDER — DEXTROSE MONOHYDRATE 25 G/50ML
12.5 INJECTION, SOLUTION INTRAVENOUS PRN
Status: DISCONTINUED | OUTPATIENT
Start: 2021-01-01 | End: 2021-01-01

## 2021-01-01 RX ORDER — LISINOPRIL 5 MG/1
TABLET ORAL
Qty: 30 TABLET | Refills: 8 | Status: SHIPPED | OUTPATIENT
Start: 2021-01-01 | End: 2021-01-01 | Stop reason: ALTCHOICE

## 2021-01-01 RX ORDER — DEXTROSE MONOHYDRATE 50 MG/ML
100 INJECTION, SOLUTION INTRAVENOUS PRN
Status: DISCONTINUED | OUTPATIENT
Start: 2021-01-01 | End: 2021-08-13 | Stop reason: HOSPADM

## 2021-01-01 RX ORDER — HEPARIN SODIUM (PORCINE) LOCK FLUSH IV SOLN 100 UNIT/ML 100 UNIT/ML
SOLUTION INTRAVENOUS
Status: DISPENSED
Start: 2021-01-01 | End: 2021-01-01

## 2021-01-01 RX ORDER — SEVELAMER CARBONATE 800 MG/1
1600 TABLET, FILM COATED ORAL
Status: DISCONTINUED | OUTPATIENT
Start: 2021-01-01 | End: 2021-08-13 | Stop reason: HOSPADM

## 2021-01-01 RX ORDER — HYDRALAZINE HYDROCHLORIDE 25 MG/1
75 TABLET, FILM COATED ORAL EVERY 8 HOURS SCHEDULED
Qty: 90 TABLET | Refills: 3 | Status: SHIPPED
Start: 2021-01-01 | End: 2021-01-01

## 2021-01-01 RX ORDER — HYDRALAZINE HYDROCHLORIDE 25 MG/1
50 TABLET, FILM COATED ORAL ONCE
Status: COMPLETED | OUTPATIENT
Start: 2021-01-01 | End: 2021-01-01

## 2021-01-01 RX ORDER — METOPROLOL TARTRATE 50 MG/1
50 TABLET, FILM COATED ORAL 2 TIMES DAILY
Status: DISCONTINUED | OUTPATIENT
Start: 2021-01-01 | End: 2021-08-13 | Stop reason: HOSPADM

## 2021-01-01 RX ORDER — CINACALCET 30 MG/1
30 TABLET, FILM COATED ORAL DAILY
COMMUNITY

## 2021-01-01 RX ORDER — FENTANYL CITRATE 50 UG/ML
25 INJECTION, SOLUTION INTRAMUSCULAR; INTRAVENOUS
Status: DISCONTINUED | OUTPATIENT
Start: 2021-01-01 | End: 2021-01-01

## 2021-01-01 RX ORDER — MORPHINE SULFATE 2 MG/ML
INJECTION, SOLUTION INTRAMUSCULAR; INTRAVENOUS
Status: COMPLETED
Start: 2021-01-01 | End: 2021-01-01

## 2021-01-01 RX ORDER — LIDOCAINE AND PRILOCAINE 25; 25 MG/G; MG/G
CREAM TOPICAL PRN
Status: DISCONTINUED | OUTPATIENT
Start: 2021-01-01 | End: 2021-01-01 | Stop reason: HOSPADM

## 2021-01-01 RX ORDER — HYDRALAZINE HYDROCHLORIDE 25 MG/1
25 TABLET, FILM COATED ORAL ONCE
Status: DISCONTINUED | OUTPATIENT
Start: 2021-01-01 | End: 2021-01-01

## 2021-01-01 RX ORDER — DEXAMETHASONE SODIUM PHOSPHATE 4 MG/ML
8 INJECTION, SOLUTION INTRA-ARTICULAR; INTRALESIONAL; INTRAMUSCULAR; INTRAVENOUS; SOFT TISSUE ONCE
Status: CANCELLED | OUTPATIENT
Start: 2021-01-01 | End: 2021-01-01

## 2021-01-01 RX ORDER — MAGNESIUM SULFATE 1 G/100ML
1000 INJECTION INTRAVENOUS PRN
Status: DISCONTINUED | OUTPATIENT
Start: 2021-01-01 | End: 2021-08-13 | Stop reason: HOSPADM

## 2021-01-01 RX ORDER — ONDANSETRON 2 MG/ML
4 INJECTION INTRAMUSCULAR; INTRAVENOUS EVERY 6 HOURS PRN
Status: DISCONTINUED | OUTPATIENT
Start: 2021-01-01 | End: 2021-01-01 | Stop reason: HOSPADM

## 2021-01-01 RX ORDER — HYDRALAZINE HYDROCHLORIDE 25 MG/1
75 TABLET, FILM COATED ORAL EVERY 8 HOURS SCHEDULED
Qty: 90 TABLET | Refills: 3 | Status: SHIPPED | OUTPATIENT
Start: 2021-01-01

## 2021-01-01 RX ORDER — SACUBITRIL AND VALSARTAN 24; 26 MG/1; MG/1
1 TABLET, FILM COATED ORAL 2 TIMES DAILY
Qty: 60 TABLET | Refills: 11 | Status: SHIPPED | OUTPATIENT
Start: 2021-01-01

## 2021-01-01 RX ORDER — DEXTROSE MONOHYDRATE 25 G/50ML
25 INJECTION, SOLUTION INTRAVENOUS PRN
Status: DISCONTINUED | OUTPATIENT
Start: 2021-01-01 | End: 2021-01-01 | Stop reason: SDUPTHER

## 2021-01-01 RX ORDER — METOPROLOL SUCCINATE 25 MG/1
50 TABLET, EXTENDED RELEASE ORAL ONCE
Status: COMPLETED | OUTPATIENT
Start: 2021-01-01 | End: 2021-01-01

## 2021-01-01 RX ADMIN — DEXTROSE MONOHYDRATE: 100 INJECTION, SOLUTION INTRAVENOUS at 10:58

## 2021-01-01 RX ADMIN — Medication 10 ML: at 23:37

## 2021-01-01 RX ADMIN — ANTICOAGULANT SODIUM CITRATE SOLUTION 20 G: 4 SOLUTION INTRAVENOUS at 20:05

## 2021-01-01 RX ADMIN — METOPROLOL SUCCINATE 50 MG: 25 TABLET, EXTENDED RELEASE ORAL at 21:49

## 2021-01-01 RX ADMIN — Medication 3 MG: at 23:04

## 2021-01-01 RX ADMIN — METOPROLOL SUCCINATE 25 MG: 25 TABLET, EXTENDED RELEASE ORAL at 12:21

## 2021-01-01 RX ADMIN — SODIUM BICARBONATE 200 MEQ: 84 INJECTION, SOLUTION INTRAVENOUS at 14:40

## 2021-01-01 RX ADMIN — Medication: at 02:04

## 2021-01-01 RX ADMIN — ISOSORBIDE MONONITRATE 30 MG: 30 TABLET ORAL at 14:33

## 2021-01-01 RX ADMIN — Medication 25 MCG/HR: at 18:39

## 2021-01-01 RX ADMIN — EPINEPHRINE 10 MCG/MIN: 1 INJECTION INTRAMUSCULAR; INTRAVENOUS; SUBCUTANEOUS at 03:06

## 2021-01-01 RX ADMIN — HYDROCORTISONE SODIUM SUCCINATE 100 MG: 100 INJECTION, POWDER, FOR SOLUTION INTRAMUSCULAR; INTRAVENOUS at 20:02

## 2021-01-01 RX ADMIN — DEXTROSE MONOHYDRATE 25 G: 25 INJECTION, SOLUTION INTRAVENOUS at 14:17

## 2021-01-01 RX ADMIN — PROPOFOL 400 MG: 10 INJECTION, EMULSION INTRAVENOUS at 07:31

## 2021-01-01 RX ADMIN — DEXTROSE MONOHYDRATE 12.5 G: 25 INJECTION, SOLUTION INTRAVENOUS at 09:27

## 2021-01-01 RX ADMIN — EPINEPHRINE 10 MCG/MIN: 1 INJECTION INTRAMUSCULAR; INTRAVENOUS; SUBCUTANEOUS at 10:14

## 2021-01-01 RX ADMIN — CINACALCET HYDROCHLORIDE 30 MG: 30 TABLET, FILM COATED ORAL at 19:29

## 2021-01-01 RX ADMIN — Medication 60 MCG/MIN: at 13:56

## 2021-01-01 RX ADMIN — CALCIUM ACETATE 1334 MG: 667 CAPSULE ORAL at 16:56

## 2021-01-01 RX ADMIN — ANTICOAGULANT SODIUM CITRATE SOLUTION 20 G: 4 SOLUTION INTRAVENOUS at 03:30

## 2021-01-01 RX ADMIN — PHENYLEPHRINE HYDROCHLORIDE 250 MCG/MIN: 10 INJECTION INTRAVENOUS at 21:57

## 2021-01-01 RX ADMIN — DEXTROSE MONOHYDRATE 150 MG: 50 INJECTION, SOLUTION INTRAVENOUS at 18:13

## 2021-01-01 RX ADMIN — PANTOPRAZOLE SODIUM 40 MG: 40 INJECTION, POWDER, FOR SOLUTION INTRAVENOUS at 12:56

## 2021-01-01 RX ADMIN — CALCIUM ACETATE 1334 MG: 667 CAPSULE ORAL at 12:34

## 2021-01-01 RX ADMIN — CALCIUM CHLORIDE 8000 MG: 100 INJECTION, SOLUTION INTRAVENOUS at 13:50

## 2021-01-01 RX ADMIN — CHLORHEXIDINE GLUCONATE 15 ML: 1.2 RINSE ORAL at 12:30

## 2021-01-01 RX ADMIN — HYDROMORPHONE HYDROCHLORIDE 0.5 MG: 1 INJECTION, SOLUTION INTRAMUSCULAR; INTRAVENOUS; SUBCUTANEOUS at 20:10

## 2021-01-01 RX ADMIN — CALCIUM ACETATE 1334 MG: 667 CAPSULE ORAL at 13:28

## 2021-01-01 RX ADMIN — Medication 3 MG: at 21:39

## 2021-01-01 RX ADMIN — LIDOCAINE AND PRILOCAINE: 25; 25 CREAM TOPICAL at 06:25

## 2021-01-01 RX ADMIN — NAFCILLIN SODIUM 2000 MG: 2 POWDER, FOR SOLUTION INTRAMUSCULAR; INTRAVENOUS at 11:25

## 2021-01-01 RX ADMIN — Medication 10 ML: at 09:00

## 2021-01-01 RX ADMIN — Medication 50 MCG/MIN: at 05:07

## 2021-01-01 RX ADMIN — Medication 10 MCG/MIN: at 17:00

## 2021-01-01 RX ADMIN — METOPROLOL TARTRATE 50 MG: 50 TABLET, FILM COATED ORAL at 21:39

## 2021-01-01 RX ADMIN — LIDOCAINE AND PRILOCAINE: 25; 25 CREAM TOPICAL at 06:35

## 2021-01-01 RX ADMIN — METOPROLOL SUCCINATE 50 MG: 25 TABLET, EXTENDED RELEASE ORAL at 21:46

## 2021-01-01 RX ADMIN — Medication 15 G: at 08:13

## 2021-01-01 RX ADMIN — HYDROCORTISONE SODIUM SUCCINATE 100 MG: 100 INJECTION, POWDER, FOR SOLUTION INTRAMUSCULAR; INTRAVENOUS at 19:40

## 2021-01-01 RX ADMIN — Medication 75 MCG/MIN: at 06:07

## 2021-01-01 RX ADMIN — Medication: at 21:01

## 2021-01-01 RX ADMIN — MAGNESIUM CHLORIDE, DEXTROSE MONOHYDRATE, LACTIC ACID, SODIUM CHLORIDE, SODIUM BICARBONATE AND POTASSIUM CHLORIDE: 2.03; 22; 5.4; 6.46; 3.09; .157 INJECTION INTRAVENOUS at 08:00

## 2021-01-01 RX ADMIN — ALBUMIN (HUMAN) 25 G: 0.25 INJECTION, SOLUTION INTRAVENOUS at 10:28

## 2021-01-01 RX ADMIN — MAGNESIUM CHLORIDE, DEXTROSE MONOHYDRATE, LACTIC ACID, SODIUM CHLORIDE, SODIUM BICARBONATE AND POTASSIUM CHLORIDE: 2.03; 22; 5.4; 6.46; 3.09; .157 INJECTION INTRAVENOUS at 13:23

## 2021-01-01 RX ADMIN — Medication 10 ML: at 22:20

## 2021-01-01 RX ADMIN — NAFCILLIN SODIUM 2000 MG: 2 POWDER, FOR SOLUTION INTRAMUSCULAR; INTRAVENOUS at 13:04

## 2021-01-01 RX ADMIN — METOPROLOL SUCCINATE 50 MG: 25 TABLET, EXTENDED RELEASE ORAL at 12:33

## 2021-01-01 RX ADMIN — LIDOCAINE AND PRILOCAINE: 25; 25 CREAM TOPICAL at 06:42

## 2021-01-01 RX ADMIN — NAFCILLIN SODIUM 2000 MG: 2 POWDER, FOR SOLUTION INTRAMUSCULAR; INTRAVENOUS at 21:20

## 2021-01-01 RX ADMIN — LISINOPRIL 10 MG: 10 TABLET ORAL at 12:33

## 2021-01-01 RX ADMIN — Medication 10 ML: at 12:33

## 2021-01-01 RX ADMIN — CHLORHEXIDINE GLUCONATE 15 ML: 1.2 RINSE ORAL at 08:20

## 2021-01-01 RX ADMIN — HYDRALAZINE HYDROCHLORIDE 75 MG: 50 TABLET, FILM COATED ORAL at 21:48

## 2021-01-01 RX ADMIN — SACUBITRIL AND VALSARTAN 1 TABLET: 24; 26 TABLET, FILM COATED ORAL at 20:37

## 2021-01-01 RX ADMIN — NAFCILLIN SODIUM 2000 MG: 2 POWDER, FOR SOLUTION INTRAMUSCULAR; INTRAVENOUS at 00:39

## 2021-01-01 RX ADMIN — Medication 10 ML: at 21:49

## 2021-01-01 RX ADMIN — Medication 10 ML: at 09:10

## 2021-01-01 RX ADMIN — SODIUM CHLORIDE, PRESERVATIVE FREE 10 ML: 5 INJECTION INTRAVENOUS at 12:57

## 2021-01-01 RX ADMIN — ISOSORBIDE MONONITRATE 30 MG: 30 TABLET ORAL at 12:33

## 2021-01-01 RX ADMIN — ASCORBIC ACID 1500 MG: 500 INJECTION INTRAVENOUS at 20:25

## 2021-01-01 RX ADMIN — AMIODARONE HYDROCHLORIDE 1 MG/MIN: 50 INJECTION, SOLUTION INTRAVENOUS at 19:41

## 2021-01-01 RX ADMIN — MIDODRINE HYDROCHLORIDE 10 MG: 5 TABLET ORAL at 07:30

## 2021-01-01 RX ADMIN — HEPARIN SODIUM 5000 UNITS: 10000 INJECTION INTRAVENOUS; SUBCUTANEOUS at 13:59

## 2021-01-01 RX ADMIN — HEPARIN SODIUM 5000 UNITS: 10000 INJECTION INTRAVENOUS; SUBCUTANEOUS at 06:04

## 2021-01-01 RX ADMIN — CALCIUM ACETATE 1334 MG: 667 CAPSULE ORAL at 12:19

## 2021-01-01 RX ADMIN — PHYTONADIONE 10 MG: 10 INJECTION, EMULSION INTRAMUSCULAR; INTRAVENOUS; SUBCUTANEOUS at 13:33

## 2021-01-01 RX ADMIN — POTASSIUM BICARBONATE 40 MEQ: 782 TABLET, EFFERVESCENT ORAL at 11:49

## 2021-01-01 RX ADMIN — ASCORBIC ACID 1500 MG: 500 INJECTION INTRAVENOUS at 02:04

## 2021-01-01 RX ADMIN — DEXTROSE MONOHYDRATE 25 G: 25 INJECTION, SOLUTION INTRAVENOUS at 14:56

## 2021-01-01 RX ADMIN — ANTICOAGULANT SODIUM CITRATE SOLUTION 20 G: 4 SOLUTION INTRAVENOUS at 21:00

## 2021-01-01 RX ADMIN — HEPARIN SODIUM 5000 UNITS: 10000 INJECTION INTRAVENOUS; SUBCUTANEOUS at 05:14

## 2021-01-01 RX ADMIN — DEXTROSE MONOHYDRATE 25 G: 25 INJECTION, SOLUTION INTRAVENOUS at 07:29

## 2021-01-01 RX ADMIN — THIAMINE HYDROCHLORIDE 200 MG: 100 INJECTION, SOLUTION INTRAMUSCULAR; INTRAVENOUS at 20:07

## 2021-01-01 RX ADMIN — SODIUM BICARBONATE: 84 INJECTION, SOLUTION INTRAVENOUS at 16:07

## 2021-01-01 RX ADMIN — HYDRALAZINE HYDROCHLORIDE 50 MG: 50 TABLET, FILM COATED ORAL at 13:31

## 2021-01-01 RX ADMIN — Medication 50 MCG/MIN: at 23:19

## 2021-01-01 RX ADMIN — METOPROLOL SUCCINATE 50 MG: 25 TABLET, EXTENDED RELEASE ORAL at 12:35

## 2021-01-01 RX ADMIN — ALBUMIN (HUMAN) 25 G: 0.25 INJECTION, SOLUTION INTRAVENOUS at 19:37

## 2021-01-01 RX ADMIN — ASCORBIC ACID 1500 MG: 500 INJECTION INTRAVENOUS at 09:15

## 2021-01-01 RX ADMIN — LISINOPRIL 10 MG: 10 TABLET ORAL at 12:19

## 2021-01-01 RX ADMIN — HYDROCORTISONE SODIUM SUCCINATE 100 MG: 100 INJECTION, POWDER, FOR SOLUTION INTRAMUSCULAR; INTRAVENOUS at 21:07

## 2021-01-01 RX ADMIN — ASCORBIC ACID 1500 MG: 500 INJECTION INTRAVENOUS at 08:48

## 2021-01-01 RX ADMIN — Medication 100 MCG/HR: at 15:34

## 2021-01-01 RX ADMIN — EPINEPHRINE: 1 INJECTION, SOLUTION INTRAMUSCULAR; SUBCUTANEOUS at 18:37

## 2021-01-01 RX ADMIN — PHENYLEPHRINE HYDROCHLORIDE 300 MCG/MIN: 10 INJECTION INTRAVENOUS at 03:36

## 2021-01-01 RX ADMIN — SODIUM BICARBONATE 100 MEQ: 84 INJECTION, SOLUTION INTRAVENOUS at 05:35

## 2021-01-01 RX ADMIN — METOPROLOL TARTRATE 50 MG: 50 TABLET, FILM COATED ORAL at 10:41

## 2021-01-01 RX ADMIN — PHENYLEPHRINE HYDROCHLORIDE 300 MCG/MIN: 10 INJECTION INTRAVENOUS at 06:06

## 2021-01-01 RX ADMIN — Medication 10 ML: at 10:39

## 2021-01-01 RX ADMIN — HYDRALAZINE HYDROCHLORIDE 75 MG: 50 TABLET, FILM COATED ORAL at 05:14

## 2021-01-01 RX ADMIN — HEPARIN SODIUM 5000 UNITS: 10000 INJECTION INTRAVENOUS; SUBCUTANEOUS at 06:27

## 2021-01-01 RX ADMIN — ACETAMINOPHEN 650 MG: 650 SUPPOSITORY RECTAL at 20:49

## 2021-01-01 RX ADMIN — ISOSORBIDE MONONITRATE 30 MG: 30 TABLET ORAL at 16:56

## 2021-01-01 RX ADMIN — HYDRALAZINE HYDROCHLORIDE 75 MG: 50 TABLET, FILM COATED ORAL at 22:20

## 2021-01-01 RX ADMIN — SODIUM CHLORIDE 1000 ML: 9 INJECTION, SOLUTION INTRAVENOUS at 17:13

## 2021-01-01 RX ADMIN — ALBUMIN (HUMAN) 25 G: 0.25 INJECTION, SOLUTION INTRAVENOUS at 03:08

## 2021-01-01 RX ADMIN — HYDRALAZINE HYDROCHLORIDE 75 MG: 50 TABLET, FILM COATED ORAL at 21:46

## 2021-01-01 RX ADMIN — Medication 10 ML: at 20:37

## 2021-01-01 RX ADMIN — Medication 45 MCG/MIN: at 15:33

## 2021-01-01 RX ADMIN — HEPARIN SODIUM 5000 UNITS: 10000 INJECTION INTRAVENOUS; SUBCUTANEOUS at 14:40

## 2021-01-01 RX ADMIN — HYDROCORTISONE SODIUM SUCCINATE 100 MG: 100 INJECTION, POWDER, FOR SOLUTION INTRAMUSCULAR; INTRAVENOUS at 05:08

## 2021-01-01 RX ADMIN — ANTICOAGULANT SODIUM CITRATE SOLUTION 20 G: 4 SOLUTION INTRAVENOUS at 00:06

## 2021-01-01 RX ADMIN — ISOSORBIDE MONONITRATE 30 MG: 30 TABLET ORAL at 12:34

## 2021-01-01 RX ADMIN — VANCOMYCIN HYDROCHLORIDE 2000 MG: 10 INJECTION, POWDER, LYOPHILIZED, FOR SOLUTION INTRAVENOUS at 21:07

## 2021-01-01 RX ADMIN — HYDROCORTISONE SODIUM SUCCINATE 100 MG: 100 INJECTION, POWDER, FOR SOLUTION INTRAMUSCULAR; INTRAVENOUS at 12:22

## 2021-01-01 RX ADMIN — DEXTROSE MONOHYDRATE 25 G: 25 INJECTION, SOLUTION INTRAVENOUS at 19:03

## 2021-01-01 RX ADMIN — HEPARIN SODIUM 5000 UNITS: 10000 INJECTION INTRAVENOUS; SUBCUTANEOUS at 13:31

## 2021-01-01 RX ADMIN — ALTEPLASE 2 MG: 2.2 INJECTION, POWDER, LYOPHILIZED, FOR SOLUTION INTRAVENOUS at 17:03

## 2021-01-01 RX ADMIN — ALBUMIN (HUMAN) 25 G: 0.25 INJECTION, SOLUTION INTRAVENOUS at 02:40

## 2021-01-01 RX ADMIN — PHENYLEPHRINE HYDROCHLORIDE 175 MCG/MIN: 10 INJECTION INTRAVENOUS at 13:02

## 2021-01-01 RX ADMIN — EPINEPHRINE: 1 INJECTION, SOLUTION INTRAMUSCULAR; SUBCUTANEOUS at 18:38

## 2021-01-01 RX ADMIN — EPINEPHRINE 5 MCG/MIN: 1 INJECTION INTRAMUSCULAR; INTRAVENOUS; SUBCUTANEOUS at 18:38

## 2021-01-01 RX ADMIN — SACUBITRIL AND VALSARTAN 1 TABLET: 24; 26 TABLET, FILM COATED ORAL at 10:38

## 2021-01-01 RX ADMIN — DOCUSATE SODIUM 50 MG AND SENNOSIDES 8.6 MG 2 TABLET: 8.6; 5 TABLET, FILM COATED ORAL at 10:20

## 2021-01-01 RX ADMIN — ANTICOAGULANT SODIUM CITRATE SOLUTION 160 ML/HR: 4 SOLUTION INTRAVENOUS at 10:17

## 2021-01-01 RX ADMIN — DEXTROSE MONOHYDRATE 12.5 G: 25 INJECTION, SOLUTION INTRAVENOUS at 19:44

## 2021-01-01 RX ADMIN — NAFCILLIN SODIUM 2000 MG: 2 POWDER, FOR SOLUTION INTRAMUSCULAR; INTRAVENOUS at 09:11

## 2021-01-01 RX ADMIN — NAFCILLIN SODIUM 2000 MG: 2 POWDER, FOR SOLUTION INTRAMUSCULAR; INTRAVENOUS at 16:08

## 2021-01-01 RX ADMIN — CALCIUM CHLORIDE 8000 MG: 100 INJECTION, SOLUTION INTRAVENOUS at 21:00

## 2021-01-01 RX ADMIN — Medication 70 MCG/MIN: at 18:23

## 2021-01-01 RX ADMIN — PANTOPRAZOLE SODIUM 40 MG: 40 INJECTION, POWDER, FOR SOLUTION INTRAVENOUS at 20:11

## 2021-01-01 RX ADMIN — HYDROMORPHONE HYDROCHLORIDE: 1 INJECTION, SOLUTION INTRAMUSCULAR; INTRAVENOUS; SUBCUTANEOUS at 05:06

## 2021-01-01 RX ADMIN — ASCORBIC ACID 1500 MG: 500 INJECTION INTRAVENOUS at 14:32

## 2021-01-01 RX ADMIN — THIAMINE HYDROCHLORIDE 200 MG: 100 INJECTION, SOLUTION INTRAMUSCULAR; INTRAVENOUS at 09:05

## 2021-01-01 RX ADMIN — ALBUMIN (HUMAN) 25 G: 0.25 INJECTION, SOLUTION INTRAVENOUS at 10:38

## 2021-01-01 RX ADMIN — PHENYLEPHRINE HYDROCHLORIDE 100 MCG/MIN: 10 INJECTION INTRAVENOUS at 14:24

## 2021-01-01 RX ADMIN — VASOPRESSIN 0.03 UNITS/MIN: 20 INJECTION INTRAVENOUS at 11:52

## 2021-01-01 RX ADMIN — Medication 2 SPRAY: at 22:05

## 2021-01-01 RX ADMIN — VASOPRESSIN 0.03 UNITS/MIN: 20 INJECTION INTRAVENOUS at 19:19

## 2021-01-01 RX ADMIN — METOPROLOL SUCCINATE 50 MG: 25 TABLET, EXTENDED RELEASE ORAL at 22:09

## 2021-01-01 RX ADMIN — ASCORBIC ACID 1500 MG: 500 INJECTION INTRAVENOUS at 21:20

## 2021-01-01 RX ADMIN — SODIUM BICARBONATE 50 MEQ: 84 INJECTION, SOLUTION INTRAVENOUS at 05:58

## 2021-01-01 RX ADMIN — HEPARIN SODIUM 5000 UNITS: 10000 INJECTION INTRAVENOUS; SUBCUTANEOUS at 22:51

## 2021-01-01 RX ADMIN — SEVELAMER CARBONATE 1600 MG: 800 TABLET, FILM COATED ORAL at 18:27

## 2021-01-01 RX ADMIN — ASCORBIC ACID 1500 MG: 500 INJECTION INTRAVENOUS at 20:28

## 2021-01-01 RX ADMIN — THIAMINE HYDROCHLORIDE 200 MG: 100 INJECTION, SOLUTION INTRAMUSCULAR; INTRAVENOUS at 08:49

## 2021-01-01 RX ADMIN — IOPAMIDOL 75 ML: 755 INJECTION, SOLUTION INTRAVENOUS at 13:40

## 2021-01-01 RX ADMIN — SEVELAMER CARBONATE 1600 MG: 800 TABLET, FILM COATED ORAL at 17:18

## 2021-01-01 RX ADMIN — Medication 75 MCG/MIN: at 09:30

## 2021-01-01 RX ADMIN — ALBUMIN (HUMAN) 25 G: 0.25 INJECTION, SOLUTION INTRAVENOUS at 21:11

## 2021-01-01 RX ADMIN — NAFCILLIN SODIUM 2000 MG: 2 POWDER, FOR SOLUTION INTRAMUSCULAR; INTRAVENOUS at 20:03

## 2021-01-01 RX ADMIN — NAFCILLIN SODIUM 2000 MG: 2 POWDER, FOR SOLUTION INTRAMUSCULAR; INTRAVENOUS at 16:18

## 2021-01-01 RX ADMIN — SACUBITRIL AND VALSARTAN 1 TABLET: 24; 26 TABLET, FILM COATED ORAL at 21:39

## 2021-01-01 RX ADMIN — Medication 100 MCG/MIN: at 21:37

## 2021-01-01 RX ADMIN — METOPROLOL SUCCINATE 50 MG: 25 TABLET, EXTENDED RELEASE ORAL at 22:51

## 2021-01-01 RX ADMIN — HEPARIN SODIUM 5000 UNITS: 10000 INJECTION INTRAVENOUS; SUBCUTANEOUS at 21:49

## 2021-01-01 RX ADMIN — PANTOPRAZOLE SODIUM 40 MG: 40 INJECTION, POWDER, FOR SOLUTION INTRAVENOUS at 08:20

## 2021-01-01 RX ADMIN — SODIUM CHLORIDE, PRESERVATIVE FREE 10 ML: 5 INJECTION INTRAVENOUS at 20:12

## 2021-01-01 RX ADMIN — SACUBITRIL AND VALSARTAN 1 TABLET: 24; 26 TABLET, FILM COATED ORAL at 09:08

## 2021-01-01 RX ADMIN — DIPHENHYDRAMINE HYDROCHLORIDE 25 MG: 50 INJECTION, SOLUTION INTRAMUSCULAR; INTRAVENOUS at 12:31

## 2021-01-01 RX ADMIN — HYDROCORTISONE SODIUM SUCCINATE 100 MG: 100 INJECTION, POWDER, FOR SOLUTION INTRAMUSCULAR; INTRAVENOUS at 11:31

## 2021-01-01 RX ADMIN — CALCIUM GLUCONATE 1000 MG: 98 INJECTION, SOLUTION INTRAVENOUS at 02:52

## 2021-01-01 RX ADMIN — DOCUSATE SODIUM 50 MG AND SENNOSIDES 8.6 MG 2 TABLET: 8.6; 5 TABLET, FILM COATED ORAL at 08:20

## 2021-01-01 RX ADMIN — HYDROCODONE BITARTRATE AND ACETAMINOPHEN 1 TABLET: 5; 325 TABLET ORAL at 03:19

## 2021-01-01 RX ADMIN — DEXTROSE MONOHYDRATE 100 ML/HR: 50 INJECTION, SOLUTION INTRAVENOUS at 09:05

## 2021-01-01 RX ADMIN — SODIUM CHLORIDE, PRESERVATIVE FREE 10 ML: 5 INJECTION INTRAVENOUS at 08:21

## 2021-01-01 RX ADMIN — PHENYLEPHRINE HYDROCHLORIDE 200 MCG/MIN: 10 INJECTION INTRAVENOUS at 19:19

## 2021-01-01 RX ADMIN — HYDROCODONE BITARTRATE AND ACETAMINOPHEN 1 TABLET: 5; 325 TABLET ORAL at 15:07

## 2021-01-01 RX ADMIN — CINACALCET HYDROCHLORIDE 30 MG: 30 TABLET, FILM COATED ORAL at 08:20

## 2021-01-01 RX ADMIN — SEVELAMER CARBONATE 1600 MG: 800 TABLET, FILM COATED ORAL at 09:08

## 2021-01-01 RX ADMIN — HYDROCODONE BITARTRATE AND ACETAMINOPHEN 1 TABLET: 5; 325 TABLET ORAL at 21:56

## 2021-01-01 RX ADMIN — ISOSORBIDE MONONITRATE 30 MG: 30 TABLET ORAL at 19:29

## 2021-01-01 RX ADMIN — Medication 10 ML: at 12:32

## 2021-01-01 RX ADMIN — MIDAZOLAM 2 MG: 1 INJECTION INTRAMUSCULAR; INTRAVENOUS at 17:15

## 2021-01-01 RX ADMIN — Medication 10 ML: at 21:47

## 2021-01-01 RX ADMIN — HEPARIN SODIUM 5000 UNITS: 10000 INJECTION INTRAVENOUS; SUBCUTANEOUS at 21:47

## 2021-01-01 RX ADMIN — CINACALCET HYDROCHLORIDE 30 MG: 30 TABLET, FILM COATED ORAL at 09:08

## 2021-01-01 RX ADMIN — Medication 3 MG: at 21:56

## 2021-01-01 RX ADMIN — MIDAZOLAM 5 MG/HR: 5 INJECTION INTRAMUSCULAR; INTRAVENOUS at 10:16

## 2021-01-01 RX ADMIN — Medication 10 ML: at 13:40

## 2021-01-01 RX ADMIN — HEPARIN SODIUM 5000 UNITS: 10000 INJECTION INTRAVENOUS; SUBCUTANEOUS at 04:54

## 2021-01-01 RX ADMIN — NAFCILLIN SODIUM 2000 MG: 2 POWDER, FOR SOLUTION INTRAMUSCULAR; INTRAVENOUS at 04:20

## 2021-01-01 RX ADMIN — DEXTROSE MONOHYDRATE 12.5 G: 25 INJECTION, SOLUTION INTRAVENOUS at 10:48

## 2021-01-01 RX ADMIN — PHENYLEPHRINE HYDROCHLORIDE 30 MCG/MIN: 10 INJECTION INTRAVENOUS at 01:41

## 2021-01-01 RX ADMIN — PIPERACILLIN AND TAZOBACTAM 3375 MG: 3; .375 INJECTION, POWDER, LYOPHILIZED, FOR SOLUTION INTRAVENOUS at 22:36

## 2021-01-01 RX ADMIN — ACETAMINOPHEN 650 MG: 325 TABLET ORAL at 16:09

## 2021-01-01 RX ADMIN — ALBUMIN (HUMAN) 25 G: 0.25 INJECTION, SOLUTION INTRAVENOUS at 19:02

## 2021-01-01 RX ADMIN — SACUBITRIL AND VALSARTAN 1 TABLET: 24; 26 TABLET, FILM COATED ORAL at 22:18

## 2021-01-01 RX ADMIN — Medication 10 ML: at 08:41

## 2021-01-01 RX ADMIN — Medication 45 MCG/MIN: at 10:13

## 2021-01-01 RX ADMIN — Medication 10 ML: at 13:28

## 2021-01-01 RX ADMIN — ETOMIDATE 20 MG: 20 INJECTION, SOLUTION INTRAVENOUS at 17:16

## 2021-01-01 RX ADMIN — SODIUM BICARBONATE: 84 INJECTION, SOLUTION INTRAVENOUS at 06:46

## 2021-01-01 RX ADMIN — SODIUM CHLORIDE 1000 ML: 9 INJECTION, SOLUTION INTRAVENOUS at 14:59

## 2021-01-01 RX ADMIN — ASCORBIC ACID 1500 MG: 500 INJECTION INTRAVENOUS at 02:05

## 2021-01-01 RX ADMIN — Medication 10 ML: at 12:21

## 2021-01-01 RX ADMIN — METOPROLOL SUCCINATE 50 MG: 25 TABLET, EXTENDED RELEASE ORAL at 20:18

## 2021-01-01 RX ADMIN — POTASSIUM CHLORIDE 20 MEQ: 1500 TABLET, EXTENDED RELEASE ORAL at 14:10

## 2021-01-01 RX ADMIN — Medication 40 MCG/MIN: at 20:42

## 2021-01-01 RX ADMIN — HYDRALAZINE HYDROCHLORIDE 75 MG: 50 TABLET, FILM COATED ORAL at 04:54

## 2021-01-01 RX ADMIN — CALCIUM GLUCONATE 2000 MG: 98 INJECTION, SOLUTION INTRAVENOUS at 13:24

## 2021-01-01 RX ADMIN — MAGNESIUM CHLORIDE, DEXTROSE MONOHYDRATE, LACTIC ACID, SODIUM CHLORIDE, SODIUM BICARBONATE AND POTASSIUM CHLORIDE: 2.03; 22; 5.4; 6.46; 3.09; .157 INJECTION INTRAVENOUS at 17:18

## 2021-01-01 RX ADMIN — PHENYLEPHRINE HYDROCHLORIDE 300 MCG/MIN: 10 INJECTION INTRAVENOUS at 09:30

## 2021-01-01 RX ADMIN — CALCIUM GLUCONATE 1000 MG: 98 INJECTION, SOLUTION INTRAVENOUS at 08:12

## 2021-01-01 RX ADMIN — GLUCAGON HYDROCHLORIDE 1 MG: KIT at 13:07

## 2021-01-01 RX ADMIN — HYDROCORTISONE SODIUM SUCCINATE 100 MG: 100 INJECTION, POWDER, FOR SOLUTION INTRAMUSCULAR; INTRAVENOUS at 03:52

## 2021-01-01 RX ADMIN — Medication 10 ML: at 20:12

## 2021-01-01 RX ADMIN — HYDRALAZINE HYDROCHLORIDE 75 MG: 50 TABLET, FILM COATED ORAL at 14:34

## 2021-01-01 RX ADMIN — HEPARIN SODIUM 5000 UNITS: 10000 INJECTION INTRAVENOUS; SUBCUTANEOUS at 14:32

## 2021-01-01 RX ADMIN — CALCIUM ACETATE 1334 MG: 667 CAPSULE ORAL at 19:20

## 2021-01-01 RX ADMIN — Medication 10 ML: at 08:20

## 2021-01-01 RX ADMIN — CINACALCET HYDROCHLORIDE 30 MG: 30 TABLET, FILM COATED ORAL at 10:38

## 2021-01-01 RX ADMIN — GLUCAGON HYDROCHLORIDE 1 MG: KIT at 07:39

## 2021-01-01 RX ADMIN — THIAMINE HYDROCHLORIDE 200 MG: 100 INJECTION, SOLUTION INTRAMUSCULAR; INTRAVENOUS at 21:08

## 2021-01-01 RX ADMIN — PHENYLEPHRINE HYDROCHLORIDE 300 MCG/MIN: 10 INJECTION INTRAVENOUS at 19:32

## 2021-01-01 RX ADMIN — HEPARIN SODIUM 5000 UNITS: 10000 INJECTION INTRAVENOUS; SUBCUTANEOUS at 20:18

## 2021-01-01 RX ADMIN — SEVELAMER CARBONATE 1600 MG: 800 TABLET, FILM COATED ORAL at 14:01

## 2021-01-01 RX ADMIN — MIDAZOLAM 1 MG/HR: 5 INJECTION INTRAMUSCULAR; INTRAVENOUS at 17:00

## 2021-01-01 RX ADMIN — HYDRALAZINE HYDROCHLORIDE 75 MG: 50 TABLET, FILM COATED ORAL at 06:27

## 2021-01-01 RX ADMIN — HYDRALAZINE HYDROCHLORIDE 75 MG: 50 TABLET, FILM COATED ORAL at 14:40

## 2021-01-01 RX ADMIN — Medication 55 MCG/MIN: at 01:53

## 2021-01-01 RX ADMIN — Medication 10 ML: at 20:10

## 2021-01-01 RX ADMIN — CHLORHEXIDINE GLUCONATE 15 ML: 1.2 RINSE ORAL at 20:07

## 2021-01-01 RX ADMIN — VASOPRESSIN 0.03 UNITS/MIN: 20 INJECTION INTRAVENOUS at 06:06

## 2021-01-01 RX ADMIN — Medication 10 ML: at 21:07

## 2021-01-01 RX ADMIN — SEVELAMER CARBONATE 1600 MG: 800 TABLET, FILM COATED ORAL at 10:38

## 2021-01-01 RX ADMIN — HYDRALAZINE HYDROCHLORIDE 75 MG: 25 TABLET, FILM COATED ORAL at 05:40

## 2021-01-01 RX ADMIN — Medication 100 MEQ: at 05:35

## 2021-01-01 RX ADMIN — ASCORBIC ACID 1500 MG: 500 INJECTION INTRAVENOUS at 14:19

## 2021-01-01 RX ADMIN — HYDRALAZINE HYDROCHLORIDE 20 MG: 20 INJECTION INTRAMUSCULAR; INTRAVENOUS at 10:56

## 2021-01-01 RX ADMIN — METOPROLOL SUCCINATE 50 MG: 25 TABLET, EXTENDED RELEASE ORAL at 13:28

## 2021-01-01 RX ADMIN — Medication 10 ML: at 20:19

## 2021-01-01 RX ADMIN — SODIUM CHLORIDE: 9 INJECTION, SOLUTION INTRAVENOUS at 07:28

## 2021-01-01 RX ADMIN — DEXTROSE MONOHYDRATE: 5 INJECTION, SOLUTION INTRAVENOUS at 18:38

## 2021-01-01 RX ADMIN — LISINOPRIL 5 MG: 10 TABLET ORAL at 13:28

## 2021-01-01 RX ADMIN — VASOPRESSIN 0.03 UNITS/MIN: 20 INJECTION INTRAVENOUS at 20:56

## 2021-01-01 RX ADMIN — HYDRALAZINE HYDROCHLORIDE 50 MG: 25 TABLET, FILM COATED ORAL at 22:05

## 2021-01-01 RX ADMIN — EPINEPHRINE 1 MCG/MIN: 1 INJECTION INTRAMUSCULAR; INTRAVENOUS; SUBCUTANEOUS at 05:11

## 2021-01-01 RX ADMIN — ISOSORBIDE MONONITRATE 30 MG: 30 TABLET ORAL at 10:39

## 2021-01-01 RX ADMIN — MORPHINE SULFATE: 2 INJECTION, SOLUTION INTRAMUSCULAR; INTRAVENOUS at 05:05

## 2021-01-01 RX ADMIN — SEVELAMER CARBONATE 1600 MG: 800 TABLET, FILM COATED ORAL at 19:29

## 2021-01-01 RX ADMIN — HYDRALAZINE HYDROCHLORIDE 75 MG: 50 TABLET, FILM COATED ORAL at 13:58

## 2021-01-01 RX ADMIN — METOPROLOL TARTRATE 50 MG: 50 TABLET, FILM COATED ORAL at 20:37

## 2021-01-01 RX ADMIN — HYDRALAZINE HYDROCHLORIDE 75 MG: 25 TABLET, FILM COATED ORAL at 22:18

## 2021-01-01 RX ADMIN — PIPERACILLIN AND TAZOBACTAM 3375 MG: 3; .375 INJECTION, POWDER, LYOPHILIZED, FOR SOLUTION INTRAVENOUS at 05:54

## 2021-01-01 RX ADMIN — CALCIUM ACETATE 1334 MG: 667 CAPSULE ORAL at 17:09

## 2021-01-01 RX ADMIN — SODIUM BICARBONATE 200 MEQ: 84 INJECTION INTRAVENOUS at 09:49

## 2021-01-01 RX ADMIN — METHYLENE BLUE 154 MG: 5 INJECTION INTRAVENOUS at 11:19

## 2021-01-01 ASSESSMENT — PULMONARY FUNCTION TESTS
PIF_VALUE: 26
PIF_VALUE: 22
PIF_VALUE: 19
PIF_VALUE: 21
PIF_VALUE: 21
PIF_VALUE: 25
PIF_VALUE: 22
PIF_VALUE: 24
PIF_VALUE: 24
PIF_VALUE: 23
PIF_VALUE: 21
PIF_VALUE: 23
PIF_VALUE: 24
PIF_VALUE: 21
PIF_VALUE: 21
PIF_VALUE: 22
PIF_VALUE: 20
PIF_VALUE: 23
PIF_VALUE: 21
PIF_VALUE: 25
PIF_VALUE: 21
PIF_VALUE: 24
PIF_VALUE: 21
PIF_VALUE: 18
PIF_VALUE: 20
PIF_VALUE: 23
PIF_VALUE: 19
PIF_VALUE: 24
PIF_VALUE: 21
PIF_VALUE: 20
PIF_VALUE: 20
PIF_VALUE: 21
PIF_VALUE: 21
PIF_VALUE: 19
PIF_VALUE: 25
PIF_VALUE: 20
PIF_VALUE: 18
PIF_VALUE: 20
PIF_VALUE: 25
PIF_VALUE: 26
PIF_VALUE: 26
PIF_VALUE: 20
PIF_VALUE: 23
PIF_VALUE: 26
PIF_VALUE: 21
PIF_VALUE: 21
PIF_VALUE: 20
PIF_VALUE: 21
PIF_VALUE: 17
PIF_VALUE: 20
PIF_VALUE: 21
PIF_VALUE: 25
PIF_VALUE: 23
PIF_VALUE: 21
PIF_VALUE: 21
PIF_VALUE: 18

## 2021-01-01 ASSESSMENT — ENCOUNTER SYMPTOMS
SINUS PRESSURE: 0
ABDOMINAL DISTENTION: 0
COLOR CHANGE: 0
SHORTNESS OF BREATH: 1
EYE REDNESS: 0
WHEEZING: 0
ABDOMINAL PAIN: 0
COUGH: 0
NAUSEA: 0
DIARRHEA: 0
CHEST TIGHTNESS: 0

## 2021-01-01 ASSESSMENT — PAIN SCALES - GENERAL
PAINLEVEL_OUTOF10: 0
PAINLEVEL_OUTOF10: 5
PAINLEVEL_OUTOF10: 10
PAINLEVEL_OUTOF10: 0
PAINLEVEL_OUTOF10: 5
PAINLEVEL_OUTOF10: 0
PAINLEVEL_OUTOF10: 7
PAINLEVEL_OUTOF10: 0
PAINLEVEL_OUTOF10: 5
PAINLEVEL_OUTOF10: 8
PAINLEVEL_OUTOF10: 5
PAINLEVEL_OUTOF10: 0
PAINLEVEL_OUTOF10: 8
PAINLEVEL_OUTOF10: 0
PAINLEVEL_OUTOF10: 10
PAINLEVEL_OUTOF10: 0

## 2021-01-01 ASSESSMENT — PAIN - FUNCTIONAL ASSESSMENT: PAIN_FUNCTIONAL_ASSESSMENT: 0-10

## 2021-01-05 NOTE — TELEPHONE ENCOUNTER
MD Vane Francis MA; Jatinder Wang plan for sub q ICD with [employer] with general anesthesia in the new year.  COVID testing prior   thx

## 2021-01-07 NOTE — TELEPHONE ENCOUNTER
Spoke with Jeff Sanchez, patient's nurse scheduled his procedure for 01/25/2021 at 7:30, patient to arrive at 6:00. Nothing to eat or drink after midnight and no medications to hold. Patient to have someone with him both to wear a mask. COVID test on 01/20/2021. She verbalized understanding.

## 2021-01-07 NOTE — TELEPHONE ENCOUNTER
Spoke with Yuan Steven, patient's nurse scheduled his procedure for 01/25/2021 at 7:30, patient to arrive at 6:00. Nothing to eat or drink after midnight and no medications to hold. Patient to have someone with him both to wear a mask. COVID test on 01/20/2021. She verbalized understanding.

## 2021-01-15 NOTE — ED NOTES
Pt gave verbal consent and understanding when he left for dialysis at 1245, treatment consent order was not until he was already there.       Marcial Crooks, MARTI  01/15/21 Joy 6, RN  01/15/21 Joy 6, RN  01/15/21 2432

## 2021-01-15 NOTE — FLOWSHEET NOTE
01/15/21 1604   Vital Signs   BP (!) 169/104   Temp 97.3 °F (36.3 °C)   Pulse 75   Post-Hemodialysis Assessment   Post-Treatment Procedures Blood returned; Access bleeding time < 10 minutes   Machine Disinfection Process Acid/Vinegar Clean;Heat Disinfect; Exterior Machine Disinfection   Rinseback Volume (ml) 300 ml   Total Liters Processed (l/min) 57.3 l/min   Dialyzer Clearance Lightly streaked   Duration of Treatment (minutes) 150 minutes   Heparin amount administered during treatment (units) 0 units   Hemodialysis Intake (ml) 300 ml   Hemodialysis Output (ml) 2540 ml   NET Removed (ml) 2240 ml   Tolerated Treatment Good   Patient Response to Treatment signed ama off dialysis 1 hour 30 min early, blood returned. , needles pulled, sites held, stasis acheived, bandaids applied, +thirll, +bruit   Physician Notified?  Yes
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
## 2021-01-28 PROBLEM — I50.43 ACUTE ON CHRONIC COMBINED SYSTOLIC AND DIASTOLIC CHF (CONGESTIVE HEART FAILURE) (HCC): Status: ACTIVE | Noted: 2021-01-01

## 2021-01-28 PROBLEM — N18.6 ESRD (END STAGE RENAL DISEASE) (HCC): Status: ACTIVE | Noted: 2021-01-01

## 2021-01-28 NOTE — Clinical Note
Patient Class: Inpatient [101]   REQUIRED: Diagnosis: ESRD (end stage renal disease) ([facility] [054260]   Estimated Length of Stay: Estimated stay of more than 2 midnights   Admitting Provider: Augustina Lyons [[id number]]

## 2021-01-29 PROBLEM — I38 VHD (VALVULAR HEART DISEASE): Status: ACTIVE | Noted: 2021-01-01

## 2021-01-29 NOTE — ED NOTES
Perfect serve to Dr Edwin Ruvalcaba about Bumex order. Pt states he is anuric, wanted to confirm order before administration. No answer at this time.      Marilu Spatz, RN  01/28/21 8730
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
## 2021-01-29 NOTE — PROGRESS NOTES
Hospitalist Progress Note      SYNOPSIS: Patient admitted on 2021 for       SUBJECTIVE: The patient states he continues to have some shortness of breath, just returned from dialysis    Patient seen and examined  Records reviewed. Stable overnight. No other overnight issues reported. Temp (24hrs), Av.8 °F (36.6 °C), Min:96.3 °F (35.7 °C), Max:98.4 °F (36.9 °C)    DIET: Diet NPO, After Midnight Exceptions are: Sips of Water with Meds  CODE: Full Code    Intake/Output Summary (Last 24 hours) at 2021 1223  Last data filed at 2021 1204  Gross per 24 hour   Intake 300 ml   Output 5800 ml   Net -5500 ml       OBJECTIVE:    BP (!) 161/96   Pulse 62   Temp 98 °F (36.7 °C)   Resp 18   Ht 5' 6\" (1.676 m)   Wt 209 lb 7 oz (95 kg)   SpO2 96%   BMI 33.80 kg/m²     General appearance: No apparent distress, appears stated age and cooperative. HEENT:  Conjunctivae/corneas clear. Neck: Supple. No jugular venous distention. No adenopathy or thyromegaly. Respiratory: Clear to auscultation anteriorly bilaterally, normal respiratory effort. Rales in the bases bilaterally left more than the right and few expiratory wheezing in the left lung. Cardiovascular: Regular rate rhythm, normal S1-S2  Abdomen: Soft, nontender, nondistended  Musculoskeletal: No clubbing, cyanosis, no bilateral lower extremity edema. Brisk capillary refill. Skin:  No rashes  on visible skin  Neurologic: awake, alert and following commands     ASSESSMENT:    Acute on chronic CHF  Cardiomyopathy, EF 25% with grade 3 diastolic dysfunction  End-stage renal disease on chronic hemodialysis.   Moderate mitral regurg  Hypertensive cardiomyopathy  Diabetes mellitus  Anemia     PLAN:  Continue to address hypervolemia with dialysis  Cardiology note appreciated        DISPOSITION:     Medications:  REVIEWED DAILY    Infusion Medications   Scheduled Medications    [START ON 2021] lisinopril  10 mg Oral Daily    hydrALAZINE  50

## 2021-01-29 NOTE — CONSULTS
Nutrition Education    · Low Sodium diet guidelines provided to pt/attached in the discharge instructions along w/ the outpatient dietitian contact information for additional guidance if needed.      Electronically signed by Amy Nieves MS, RD, LD on 1/29/21 at 1:40 PM EST    Contact: [phone]
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
## 2021-01-29 NOTE — ED NOTES
Pt gets dialysis treatment on Tuesday, Thursday and Saturday. Pt went to dialysis appointment today (1/28).      Boston Kelly RN  01/28/21 2010

## 2021-01-29 NOTE — ED NOTES
Bed: 06  Expected date:   Expected time:   Means of arrival:   Comments:  triage     Kellie Ward RN  01/28/21 1954

## 2021-01-29 NOTE — CARE COORDINATION
Patient from home, lives with his delia's father. No assistive devices in the home. He goes to [address] [facility] 5:20am chair time. He gets cab transport through [place] to . He does not have PCP but would be will to have one set up. Called registration but they do not have any appointments until into March. Number placed on patient's follow up instructions to call for new PCP. His brother will provide transport home. No other needs. For questions I can be reached at [phone].  Pippa Carbajal [place]

## 2021-01-29 NOTE — DISCHARGE INSTR - DIET
 Good nutrition is important when healing from an illness, injury, or surgery. Follow any nutrition recommendations given to you during your hospital stay.  If you were given an oral nutrition supplement while in the hospital, continue to take this supplement at home. You can take it with meals, in-between meals, and/or before bedtime. These supplements can be purchased at most local grocery stores, pharmacies, and chain super-stores.  If you have any questions about your diet or nutrition, call the hospital and ask for the dietitian. Outpatient dietitian  [phone]    A heart-healthy diet is recommended to reduce your unhealthy blood cholesterol levels, manage high blood pressure, and lower your risk for heart disease. To follow a heart-healthy diet,  Eat a balanced diet with whole grains, fruits and vegetables, and lean protein sources. Achieve and maintain a healthy weight. Choose heart-healthy unsaturated fats. Limit saturated fats, trans fats, and cholesterol intake. Eat more plant-based or vegetarian meals using beans and soy foods for protein. Eat whole, unprocessed foods to limit the amount of sodium (salt) you eat. Limit refined carbohydrates especially sugar, sweets and sugar-sweetened beverages. If you drink alcohol, do so in moderation: one serving per day (women) and two servings per day (men). One serving is equivalent to 12 ounces beer, 5 ounces wine, or 1.5 ounces distilled spirits    Tips  Tips for Choosing Heart-Healthy Fats  Choose lean protein and low-fat dairy foods to reduce saturated fat intake. Saturated fat is usually found in animal-based protein and is associated with certain health risks. Saturated fat is the biggest contributor to raised low-density lipoprotein (LDL) cholesterol levels in the diet. Research shows that limiting saturated fat lowers unhealthy cholesterol levels. Eat no more than 7% of your total calories each day from saturated fat.  Ask your RDN to help you determine how much saturated fat is right for you. There are many foods that do not contain large amounts of saturated fats. Swapping these foods to replace foods high in saturated fats will help you limit the saturated fat you eat and improve your cholesterol levels. You can also try eating more plant-based or vegetarian meals. Instead of Try:   Whole milk, cheese, yogurt, and ice cream 1%, ½%, or skim milk, low-fat cheese, non-fat yogurt, and low-fat ice cream   Fatty, marbled beef and pork Lean beef, pork, or venison   Poultry with skin Poultry without skin   Butter, stick margarine Reduced-fat, whipped, or liquid spreads   Coconut oil, palm oil Liquid vegetable oils: corn, canola, olive, soybean and safflower oils   Avoid trans fats. Trans fats increase levels of LDL-cholesterol. Hydrogenated fat in processed foods is the main source of trans fats in foods. Trans fats can be found in stick margarine, shortening, processed sweets, baked goods, some fried foods, and packaged foods made with hydrogenated oils. Avoid foods with partially hydrogenated oil on the ingredient list such as: cookies, pastries, baked goods, biscuits, crackers, microwave popcorn, and frozen dinners. Choose foods with heart healthy fats. Polyunsaturated and monounsaturated fat are unsaturated fats that may help lower your blood cholesterol level when used in place of saturated fat in your diet. Ask your RDN about taking a dietary supplement with plant sterols and stanols to help lower your cholesterol level. Research shows that substituting saturated fats with unsaturated fats is beneficial to cholesterol levels. Try these easy swaps:   Instead of Try:   Butter, stick margarine, or solid shortening Reduced-fat, whipped, or liquid spreads   Beef, pork, or poultry with skin Fish and seafood   Chips, crackers, snack foods Raw or unsalted nuts and seeds or nut butters  Hummus with vegetables  Avocado on toast Choose fiber supplements made with viscous fibers such as psyllium seed husks or methylcellulose to help lower unhealthy cholesterol. Limit refined carbohydrates   There are three types of carbohydrates: starches, sugar, and fiber. Some carbohydrates occur naturally in food, like the starches in rice or corn or the sugars in fruits and milk. Refined carbohydrates--foods with high amounts of simple sugars--can raise triglyceride levels. High triglyceride levels are associated with coronary heart disease. Some examples of refined carbohydrate foods are table sugar, sweets, and beverages sweetened with added sugar. Tips for Reducing Sodium (Salt)  Although sodium is important for your body to function, too much sodium can be harmful for people with high blood pressure. As sodium and fluid buildup in your tissues and bloodstream, your blood pressure increases. High blood pressure may cause damage to other organs and increase your risk for a stroke. Even if you take a pill for blood pressure or a water pill (diuretic) to remove fluid, it is still important to have less salt in your diet. Ask your doctor and RDN what amount of sodium is right for you. Avoid processed foods. Eat more fresh foods. Fresh fruits and vegetables are naturally low in sodium, as well as frozen vegetables and fruits that have no added juices or sauces. Fresh meats are lower in sodium than processed meats, such as kurtz, sausage, and hotdogs. Read the nutrition label or ask your  to help you find a fresh meat that is low in sodium. Eat less salt--at the table and when cooking. A single teaspoon of table salt has 2,300 mg of sodium. Leave the salt out of recipes for pasta, casseroles, and soups. Ask your RDN how to cook your favorite recipes without sodium  Be a smart . Look for food packages that say salt-free or sodium-free.  These items contain less than 5 milligrams of sodium per serving.    Very low-sodium products contain less than 35 milligrams of sodium per serving. Low-sodium products contain less than 140 milligrams of sodium per serving. Beware of reduced salt or reduced sodium products. These items may still be high in sodium. Check the nutrition label. Add flavors to your food without adding sodium. Try lemon juice, lime juice, fruit juice or vinegar. Dry or fresh herbs add flavor. Try basil, bay leaf, dill, rosemary, parsley, demi, dry mustard, nutmeg, thyme, and paprika. Pepper, red pepper flakes, and cayenne pepper can add spice to your meals without adding sodium. Hot sauce contains sodium, but if you use just a drop or two, it will not add up to much. Buy a sodium-free seasoning blend or make your own at home. Additional Lifestyle Tips  Achieve and maintain a healthy weight. Talk with your RDN or your doctor about what is a healthy weight for you. Set goals to reach and maintain that weight. To lose weight, reduce your calorie intake along with increasing your physical activity. A weight loss of 10 to 15 pounds could reduce LDL-cholesterol by 5 milligrams per deciliter. Participate in physical activity. Talk with your health care team to find out what types of physical activity are best for you. Set a plan to get about 30 minutes of exercise on most days.     Foods Not Recommended  Food Group Foods Not Recommended   Grains Breakfast cereals, packaged baked goods, snack crackers, and prepared grains with more than 300 mg sodium per serving    Biscuits, cornbread, and other quick breads prepared with baking soda  Breads or crackers topped with salt  Bakery products, such as doughnuts, biscuits, croissants, [place] pastries, pies, cookies  Instant potatoes, noodles, rice, stuffing mix, or macaroni and cheese  Snacks made with partially hydrogenated oils, including chips, cheese puffs, snack mixes, regular crackers, butter-flavored popcorn  Prepackaged bread crumbs    Self-rising flours   Protein Foods Meats high in saturated fat such as ribs, t-bone steak, regular 70/30 hamburger  Processed red meats, such as kurtz, sausage, ham, pepperoni, hot dogs, corned beef, cured or smoked meats, canned meat, chili, lily sausage, sardines, and spam with added sodium  Deli meats, such as pastrami, bologna, or salami (made of meat or poultry) with added sodium  Organ meat such as liver, gizzards, or sweetbread  Preseasoned and precooked meats  Poultry with skin or processed poultry (chicken and turkey) with skin, breading, or high-sodium marinades or sauces  Whole eggs or egg yolks (greater than 5 per week)  [facility], poultry, or fish  Smoked fish and meats  Salted legumes, nuts, seeds, or nut/seed butters  Meat alternatives with high levels of sodium (>300 mg per serving) or saturated fat (>5 g per serving)   Dairy Whole milk,?2% fat milk, or Buttermilk  Cream, half-&-half  Cream cheese, sour cream  Regular and processed cheese or sauces  Regular-sodium cottage cheese   Vegetables Canned or frozen vegetables with salt, fresh vegetables prepared with salt, butter, cheese, or cream sauce  Pickled vegetables such as olives, pickles, or sauerkraut  Tomato or pasta sauce with high levels of salt (>300 mg per serving)  Fried vegetables   Fruits None   Oils Solid shortening or partially hydrogenated oils  Solid margarine made with hydrogenated or partially hydrogenated oils or trans fat  Salted margarine that contains trans fats  Butter (salted or unsalted)  Salad dressings with trans fat or high levels of sodium ([place], blue cheese, [place], [place])  Tropical oils (coconut, palm, palm kernel oils)   Other Sugary and/or fatty desserts, candy, and other sweets  Canned soups that are >600 mg of sodium  Frozen meals and prepared sides that are >600 mg of sodium  Store-bought egg beaters (with added sodium)  Salts: sea salt, kosher salt, onion salt, and garlic salt, seasoning mixes containing salt  Flavorings: bouillon cubes, catsup or ketchup, barbeque sauce, Worcestershire sauce, soy sauce, salsa, relish, teriyaki sauce     Heart-Healthy Eating Sample 1-Day Menu View Nutrient Info   Breakfast 1 cup oatmeal   1 cup fat-free milk   1 cup blueberries   1 cup brewed coffee   1 ounce almonds   Lunch 2 slices whole-wheat bread   2 oz lean deli turkey breast   1 oz low-fat Swiss cheese   2 slices tomato   2 lettuce leaves   1 pear   1 cup skim milk   Afternoon Snack 1 oz trail mix (with nuts, seeds, raisins)   Evening Meal 3 oz broiled salmon   2/3 cup brown rice   1 tsp margarine   1/2 cup cooked broccoli   1/2 cup cooked carrots   1 cup tossed salad   1 teaspoon olive oil and vinegar dressing   1 small whole-wheat roll   1 tsp margarine   1 cup tea   Evening Snack 1 banana
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
## 2021-01-30 NOTE — PROGRESS NOTES
Hospitalist Progress Note      PCP: Sandeep Carballo MD    Date of Admission: 1/28/2021    Chief Complaint: **ESRD and NON compliance     Hospital Course: non compliance, and needs a ICD , to be placed during this visit     Subjective: *wants to go home       Medications:  Reviewed    Infusion Medications   Scheduled Medications    lisinopril  10 mg Oral Daily    isosorbide mononitrate  30 mg Oral Daily    hydrALAZINE  75 mg Oral 3 times per day    Calcium Acetate (Phos Binder)  1,334 mg Oral TID WC    metoprolol succinate  50 mg Oral BID    sodium chloride flush  10 mL Intravenous 2 times per day    heparin (porcine)  5,000 Units Subcutaneous 3 times per day     PRN Meds: lidocaine-prilocaine, sodium chloride flush, promethazine **OR** ondansetron, polyethylene glycol, acetaminophen **OR** acetaminophen, nitroGLYCERIN      Intake/Output Summary (Last 24 hours) at 1/30/2021 1607  Last data filed at 1/30/2021 1127  Gross per 24 hour   Intake 300 ml   Output 5450 ml   Net -5150 ml       Exam:    BP (!) 165/90   Pulse 73   Temp 97.7 °F (36.5 °C) (Oral)   Resp 20   Ht 5' 6\" (1.676 m)   Wt 205 lb 0.4 oz (93 kg)   SpO2 98%   BMI 33.09 kg/m²       cooperative. HEENT:  Conjunctivae/corneas clear. Neck: Supple. No jugular venous distention. No adenopathy or thyromegaly. Respiratory: Clear to auscultation anteriorly bilaterally,  Cardiovascular: Regular rate rhythm, normal S1-S2  Abdomen: Soft, nontender, nondistended  Musculoskeletal: No clubbing, cyanosis, no bilateral lower extremity edema. Brisk capillary refill.    Skin:  No rashes  on visible skin  Neurologic: awake, alert and following commands              Labs:   Recent Labs     01/28/21  1732 01/29/21 0313 01/30/21  0439   WBC 4.7 4.7 5.3   HGB 10.2* 10.4* 10.2*   HCT 33.4* 33.3* 32.6*    152 175     Recent Labs     01/28/21  1732 01/29/21  0313 01/30/21  0439    143 141   K 3.5 4.0 3.5   CL 97* 96* 95*   CO2 32* 32* 33* BUN 24* 30* 22*   CREATININE 4.7* 5.4* 4.6*   CALCIUM 8.6 9.1 9.1   PHOS  --   --  2.6     No results for input(s): AST, ALT, BILIDIR, BILITOT, ALKPHOS in the last 72 hours. Recent Labs     01/28/21  1732   INR 1.6     Recent Labs     01/28/21  1732 01/28/21  2259 01/29/21  0313   TROPONINI 0.11* 0.11* 0.11*     No results for input(s): AST, ALT, ALB, BILIDIR, BILITOT, ALKPHOS in the last 72 hours. No results for input(s): LACTA in the last 72 hours.   No results found for: Noman Funez  No results found for: AMMONIA    Assessment:    Active Hospital Problems    Diagnosis Date Noted    Nonischemic cardiomyopathy ([facility] [I42.8]     VHD (valvular heart disease) Guillermo Mario 01/28/2021    Acute on chronic combined systolic and diastolic CHF (congestive heart failure) ([facility] [I50.43] 01/28/2021    Atrial fibrillation ([facility] [I48.91] 08/28/2020    ESRD needing dialysis ([facility] [N18.6, Z99.2]     Anemia [D64.9]     Type 2 diabetes mellitus with diabetic chronic kidney disease ([facility] [E11.22]     Essential hypertension [I10] 11/12/2015    Dyslipidemia associated with type 2 diabetes mellitus ([facility] [E11.69, E78.5] 11/12/2015    Diabetes ([facility] [E11.9]        Plan:  * hydralazine   imdur   toprol  zestril   needs ICD      DVT Prophylaxis: *heparin  Diet: DIET RENAL;  Code Status: Full Code    PT/OT Eval Status: na    Dispo - *home      Electronically signed by Elvie Freeman DO on 1/30/2021 at 4:07 PM [facility]

## 2021-01-30 NOTE — FLOWSHEET NOTE
01/30/21 1127   Vital Signs   BP (!) 162/93   Temp 97.5 °F (36.4 °C)   Pulse 68   Weight 205 lb 0.4 oz (93 kg)   Weight Method Bed scale   Percent Weight Change -5.97   Post-Hemodialysis Assessment   Post-Treatment Procedures Blood returned; Access bleeding time < 10 minutes   Machine Disinfection Process Exterior Machine Disinfection   Rinseback Volume (ml) 300 ml   Total Liters Processed (l/min) 97.5 l/min   Dialyzer Clearance Lightly streaked   Duration of Treatment (minutes) 270 minutes   Heparin amount administered during treatment (units) 0 units   Hemodialysis Intake (ml) 300 ml   Hemodialysis Output (ml) 5450 ml   NET Removed (ml) 5150 ml   Tolerated Treatment Good   Patient Response to Treatment tolerated well,c/o cramping at end blood returned, needles pulled, sites held, stasis achieved, bandaids applied, +thirll, +bruit

## 2021-01-30 NOTE — FLOWSHEET NOTE
Pt arrived to dialysis, said good morning to pt. Pt responds with \"im pissed\". Asked pt what was wrong and pt states \"never mind I dont want to talk to you\". Asked pt if he had emla cream placed on arm, pt did not repond.
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
## 2021-01-31 NOTE — PROGRESS NOTES
33.3* 32.6* 33.8*    175 173     Recent Labs     21  0313 21  0439 21  0452    141 136   K 4.0 3.5 3.9   CL 96* 95* 93*   CO2 32* 33* 29   BUN 30* 22* 17   CREATININE 5.4* 4.6* 4.4*   CALCIUM 9.1 9.1 9.2   PHOS  --  2.6  --      No results for input(s): AST, ALT, BILIDIR, BILITOT, ALKPHOS in the last 72 hours. Recent Labs     21  173   INR 1.6     Recent Labs     21  1732 21  2259 213   TROPONINI 0.11* 0.11* 0.11*     No results for input(s): AST, ALT, ALB, BILIDIR, BILITOT, ALKPHOS in the last 72 hours. No results for input(s): LACTA in the last 72 hours.   No results found for: Carolyn   No results found for: AMMONIA    Assessment:    Active Hospital Problems    Diagnosis Date Noted    Nonischemic cardiomyopathy ([facility] [I42.8]     VHD (valvular heart disease) Lolis Harrisoner 2021    Acute on chronic combined systolic and diastolic CHF (congestive heart failure) [facility] [I50.43] 2021    Atrial fibrillation ([facility] [I48.91] 2020    ESRD needing dialysis ([facility] [N18.6, Z99.2]     Anemia [D64.9]     Type 2 diabetes mellitus with diabetic chronic kidney disease ([facility] [E11.22]     Essential hypertension [I10] 2015    Dyslipidemia associated with type 2 diabetes mellitus ([facility] [E11.69, E78.5] 2015    Diabetes ([facility] [E11.9]        Plan:  *hydralazine   imdur   toprol  zestril   needs ICD        DVT Prophylaxis: *heparin  Diet: DIET RENAL;  Code Status: Full Code     PT/OT Eval Status: na     Dispo - *home**        Electronically signed by Sophia Renteria DO on 2021 at 3:59 PM [facility]
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
## 2021-02-01 NOTE — FLOWSHEET NOTE
02/01/21 1119   Vital Signs   BP (!) 167/86   Temp 97.1 °F (36.2 °C)   Pulse 84   Resp 18   Weight 196 lb 6.9 oz (89.1 kg)   Weight Method Bed scale   Percent Weight Change -5.51   Pain Assessment   Pain Assessment 0-10   Pain Level 0   Post-Hemodialysis Assessment   Post-Treatment Procedures Blood returned; Access bleeding time < 10 minutes   Machine Disinfection Process Exterior Machine Disinfection   Rinseback Volume (ml) 300 ml   Total Liters Processed (l/min) 96.7 l/min   Dialyzer Clearance Moderately streaked   Duration of Treatment (minutes) 265 minutes   Heparin amount administered during treatment (units) 0 units   Hemodialysis Intake (ml) 300 ml   Hemodialysis Output (ml) 5150 ml   NET Removed (ml) 4850 ml   Tolerated Treatment Good   Patient Response to Treatment tx terminated per pt request with 5 minutes remaining, profile B, refill noted, 4850cc fluid removal   Bilateral Breath Sounds Diminished   Edema Right lower extremity; Left lower extremity   Physician Notified?  No

## 2021-02-01 NOTE — PLAN OF CARE
Goal met
Problem: OXYGENATION/RESPIRATORY FUNCTION  Goal: Patient will maintain patent airway  Outcome: Met This Shift  Goal: Patient will achieve/maintain normal respiratory rate/effort  Description: Respiratory rate and effort will be within normal limits for the patient  Outcome: Met This Shift     Problem: Falls - Risk of:  Goal: Will remain free from falls  Description: Will remain free from falls  Outcome: Met This Shift  Goal: Absence of physical injury  Description: Absence of physical injury  Outcome: Met This Shift
Problem: OXYGENATION/RESPIRATORY FUNCTION  Goal: Patient will maintain patent airway  Outcome: Met This Shift  Goal: Patient will achieve/maintain normal respiratory rate/effort  Description: Respiratory rate and effort will be within normal limits for the patient  Outcome: Met This Shift     Problem: Falls - Risk of:  Goal: Will remain free from falls  Description: Will remain free from falls  Outcome: Met This Shift  Goal: Absence of physical injury  Description: Absence of physical injury  Outcome: Met This Shift
Problem: Tissue Perfusion - Renal, Altered:  Goal: Electrolytes within specified parameters  Description: Electrolytes within specified parameters  1/31/2021 0137 by Bobo Saha RN  Outcome: Met This Shift
Problem: Tissue Perfusion - Renal, Altered:  Goal: Electrolytes within specified parameters  Description: Electrolytes within specified parameters  1/31/2021 1248 by Alek Anthony RN  Outcome: Ongoing  1/31/2021 0137 by Lulu Clark RN  Outcome: Met This Shift
Problem: Tissue Perfusion - Renal, Altered:  Goal: Electrolytes within specified parameters  Description: Electrolytes within specified parameters  Outcome: Met This Shift  Goal: Urine creatinine clearance will be within specified parameters  Description: Urine creatinine clearance will be within specified parameters  Outcome: Met This Shift  Goal: Serum creatinine will be within specified parameters  Description: Serum creatinine will be within specified parameters  Outcome: Met This Shift  Goal: Ability to achieve a balanced intake and output will improve  Description: Ability to achieve a balanced intake and output will improve  Outcome: Met This Shift     Problem: OXYGENATION/RESPIRATORY FUNCTION  Goal: Patient will maintain patent airway  1/31/2021 0137 by Aramis Vazquez RN  Outcome: Met This Shift  1/30/2021 2203 by Mina Rangel RN  Outcome: Met This Shift  Goal: Patient will achieve/maintain normal respiratory rate/effort  Description: Respiratory rate and effort will be within normal limits for the patient  1/31/2021 0137 by Aramis Vazquez RN  Outcome: Met This Shift  1/30/2021 2203 by Mina Rangel RN  Outcome: Met This Shift     Problem: HEMODYNAMIC STATUS  Goal: Patient has stable vital signs and fluid balance  Outcome: Met This Shift
1708 by John Baldwin RN  Outcome: Completed  2/1/2021 0639 by Waldo Dorantes RN  Outcome: Met This Shift  Goal: Absence of physical injury  Description: Absence of physical injury  2/1/2021 1708 by John Baldwin RN  Outcome: Completed  2/1/2021 0639 by Waldo Dorantes RN  Outcome: Met This Shift     Problem: Falls - Risk of:  Goal: Absence of physical injury  Description: Absence of physical injury  2/1/2021 1708 by John Baldwin RN  Outcome: Completed  2/1/2021 0639 by Waldo Dorantes RN  Outcome: Met This Shift

## 2021-02-01 NOTE — CARE COORDINATION
Patient ambulating out to the halls today independently, he is hopeful he will be discharged. Confirmed with patient he does have a lifevest at home. His brother will provide transport home. For questions I can be reached at [phone].  Madi Corrigan

## 2021-02-01 NOTE — PROGRESS NOTES
Hospitalist Progress Note      PCP: Donal Telles MD    Date of Admission: 1/28/2021    Chief Complaint: *ESRD and NON compliance      Hospital Course: non compliance, and needs a ICD , to be placed during this visit **explained the importance of following instructions after ICD placement. EP now wants to do the ICD as OP. Spoke with nephrology still to received daily dialysis  For now    **  Subjective: **has life vest and it is working at home      Medications:  Reviewed    Infusion Medications   Scheduled Medications    lisinopril  10 mg Oral Daily    isosorbide mononitrate  30 mg Oral Daily    hydrALAZINE  75 mg Oral 3 times per day    Calcium Acetate (Phos Binder)  1,334 mg Oral TID WC    metoprolol succinate  50 mg Oral BID    sodium chloride flush  10 mL Intravenous 2 times per day    heparin (porcine)  5,000 Units Subcutaneous 3 times per day     PRN Meds: lidocaine-prilocaine, sodium chloride flush, promethazine **OR** ondansetron, polyethylene glycol, acetaminophen **OR** acetaminophen, nitroGLYCERIN      Intake/Output Summary (Last 24 hours) at 2/1/2021 1601  Last data filed at 2/1/2021 1119  Gross per 24 hour   Intake 900 ml   Output 5150 ml   Net -4250 ml       Exam:    /69   Pulse 84   Temp 97.1 °F (36.2 °C)   Resp 18   Ht 5' 6\" (1.676 m)   Wt 196 lb 6.9 oz (89.1 kg)   SpO2 98%   BMI 31.70 kg/m²       Gen: *well developed  HEENT: NC/AT, moist mucous membranes,   Neck: supple, trachea midline, no anterior cervical or SC LAD  Heart:  Normal s1/s2, RRR, no murmurs, gallops, or rubs. Lungs:  *cta  bilaterally, *  Abd: bowel sounds present, soft, nontender, nondistended, no masses  Extrem:  No clubbing, cyanosis,  *no* edema  Skin: no rashes or lesions  Psych: A & O x3  Neuro: grossly intact, moves all four extremities.     Capillary Refill: Brisk,< 3 seconds   Peripheral Pulses: +2 palpable, equal bilaterally                   Labs:   Recent Labs     01/30/21  1309 01/31/21 0452 02/01/21 0441   WBC 5.3 5.5 5.5   HGB 10.2* 10.9* 11.2*   HCT 32.6* 33.8* 35.9*    173 163     Recent Labs     01/30/21  0439 01/31/21 0452 02/01/21 0441    136 137   K 3.5 3.9 3.9   CL 95* 93* 94*   CO2 33* 29 32*   BUN 22* 17 30*   CREATININE 4.6* 4.4* 6.5*   CALCIUM 9.1 9.2 9.6   PHOS 2.6  --   --      No results for input(s): AST, ALT, BILIDIR, BILITOT, ALKPHOS in the last 72 hours. No results for input(s): INR in the last 72 hours. No results for input(s): Shepehrd Patrick in the last 72 hours. No results for input(s): AST, ALT, ALB, BILIDIR, BILITOT, ALKPHOS in the last 72 hours. No results for input(s): LACTA in the last 72 hours.   No results found for: Kyleigh Weston  No results found for: AMMONIA    Assessment:    Active Hospital Problems    Diagnosis Date Noted    Nonischemic cardiomyopathy ([facility] [I42.8]     VHD (valvular heart disease) Elinor Fine 01/28/2021    Acute on chronic combined systolic and diastolic CHF (congestive heart failure) ([facility] [I50.43] 01/28/2021    Atrial fibrillation ([facility] [I48.91] 08/28/2020    ESRD needing dialysis ([facility] [N18.6, Z99.2]     Anemia [D64.9]     Type 2 diabetes mellitus with diabetic chronic kidney disease ([facility] [E11.22]     Essential hypertension [I10] 11/12/2015    Dyslipidemia associated with type 2 diabetes mellitus ([facility] [E11.69, E78.5] 11/12/2015    Diabetes ([facility] [E11.9]        Plan:  **hydralazine   imdur   toprol  zestril   needs ICD   daily dialysis     DVT Prophylaxis: *heparin  Diet: DIET RENAL;  Code Status: Full Code     PT/OT Eval Status: na     Dispo - *home**  Electronically signed by Cesar Ma DO on 2/1/2021 at 4:01 PM [facility]

## 2021-02-04 NOTE — DISCHARGE SUMMARY
Hospital Medicine Discharge Summary    Patient: Kieran Mejia     Gender: male  : 1965   Age: 50 y.o. MRN: [id number]    Code Status:  Full code    Primary Care Provider: Donal Telles MD    Admit Date: 2021   Discharge Date: 2021      Admitting Physician: Leny Venegas MD  Discharge Physician: Timothy Cerda DO     Discharge Diagnoses: Active Hospital Problems    Diagnosis Date Noted    Nonischemic cardiomyopathy ([facility] [I42.8]     VHD (valvular heart disease) Martha Tom 2021    Acute on chronic combined systolic and diastolic CHF (congestive heart failure) ([facility] [I50.43] 2021    Atrial fibrillation ([facility] [I48.91] 2020    ESRD needing dialysis ([facility] [N18.6, Z99.2]     Anemia [D64.9]     Type 2 diabetes mellitus with diabetic chronic kidney disease ([facility] [E11.22]     Essential hypertension [I10] 2015    Dyslipidemia associated with type 2 diabetes mellitus ([address] [E11.69, E78.5] 2015    Diabetes ([facility] [E11.9]    non compliance    Hospital Course:   *non compliance, and needs a ICD to be placed during this visit **explained the importance of following instructions after ICD placement. EP now wants to do the ICD as OP. He was receiving dialysis daily while a patient , due to volume overload. Disposition:  Home    Exam:     /69   Pulse 91   Temp 98.3 °F (36.8 °C) (Temporal)   Resp 20   Ht 5' 6\" (1.676 m)   Wt 196 lb 6.9 oz (89.1 kg)   SpO2 97%   BMI 31.70 kg/m²     General appearance:  No apparent distress, appears stated age and cooperative. HEENT:  Normal cephalic, atraumatic without obvious deformity. Pupils equal, round, and reactive to light. Extra ocular muscles intact. Conjunctivae/corneas clear. Neck: Supple, with full range of motion. No jugular venous distention. Trachea midline. Respiratory:  Normal respiratory effort. Clear to auscultation, bilaterally without Rales/Wheezes/Rhonchi.   Cardiovascular:  Regular rate and rhythm with normal S1/S2 without murmurs, rubs or gallops. Abdomen: Soft, non-tender, non-distended with normal bowel sounds. Musculoskeletal:  No clubbing, cyanosis or edema bilaterally. Full range of motion without deformity. Skin: Skin color, texture, turgor normal.  No rashes or lesions. Neurologic:  Neurovascularly intact without any focal sensory/motor deficits. Cranial nerves: II-XII intact, grossly non-focal.  Psychiatric:  Alert and oriented, thought content appropriate, normal insight    Consults:     IP CONSULT TO HEART FAILURE NURSE/COORDINATOR  IP CONSULT TO DIETITIAN  IP CONSULT TO CARDIOLOGY  IP CONSULT TO ELECTROPHYSIOLOGY  IP CONSULT TO NEPHROLOGY    Labs: For convenience and continuity at follow-up the following most recent labs are provided:    Lab Results   Component Value Date    WBC 5.5 02/01/2021    HGB 11.2 02/01/2021    HCT 35.9 02/01/2021    MCV 91.1 02/01/2021     02/01/2021     02/01/2021    K 3.9 02/01/2021    K 3.3 01/15/2021    CL 94 02/01/2021    CO2 32 02/01/2021    BUN 30 02/01/2021    CREATININE 6.5 02/01/2021    CALCIUM 9.6 02/01/2021    PHOS 2.6 01/30/2021    ALKPHOS 118 01/15/2021    ALT 10 01/15/2021    AST 20 01/15/2021    BILITOT 0.8 01/15/2021    LABALBU 3.7 01/15/2021    LDLCALC 31 01/29/2021    TRIG 51 01/29/2021     Lab Results   Component Value Date    INR 1.6 01/28/2021    INR 1.9 01/21/2021    INR 1.3 11/02/2020       Radiology:  XR CHEST (2 VW)   Final Result   Cardiomegaly and pulmonary vascular congestion.           Discharge Medications:   Discharge Medication List as of 2/1/2021  4:57 PM        Discharge Medication List as of 2/1/2021  4:57 PM      CONTINUE these medications which have CHANGED    Details   isosorbide mononitrate (IMDUR) 30 MG extended release tablet Take 1 tablet by mouth daily, Disp-30 tablet, R-3Normal      hydrALAZINE (APRESOLINE) 25 MG tablet Take 3 tablets by mouth every 8 hours, Disp-90 tablet, R-3Normal lisinopril (PRINIVIL;ZESTRIL) 10 MG tablet Take 1 tablet by mouth daily, Disp-30 tablet, R-3Normal           Discharge Medication List as of 2/1/2021  4:57 PM      CONTINUE these medications which have NOT CHANGED    Details   metoprolol succinate (TOPROL XL) 50 MG extended release tablet Take 1 tablet by mouth daily, Disp-30 tablet, R-3Normal      Calcium Acetate, Phos Binder, 667 MG CAPS Take 1,334 mg by mouth 3 times daily (with meals) Historical Med      lidocaine-prilocaine (EMLA) 2.5-2.5 % cream APPLY SMALL AMOUNT TO ACCESS SITE (AVF) 1 HOUR BEFORE DIALYSIS. COVER WITH OCCLUSIVE DRESSING (SARAN WRAP), Historical Med           Discharge Medication List as of 2/1/2021  4:57 PM      STOP taking these medications       isosorbide dinitrate (ISORDIL) 10 MG tablet Comments:   Reason for Stopping:         dronabinol (MARINOL) 5 MG capsule Comments:   Reason for Stopping:         cinacalcet (SENSIPAR) 30 MG tablet Comments:   Reason for Stopping: Follow-up appointments:  1 week    Provider Follow-up:    pmd    Condition at Discharge:  Stable    The patient was seen and examined on day of discharge and this discharge summary is in conjunction with any daily progress note from day of discharge. Time Spent on discharge is 45 minutes  in the examination, evaluation, counseling and review of medications and discharge plan. Signed:    WANDER Gonzalez   2/4/2021      Thank you Ure Jesus Wesley MD for the opportunity to be involved in this patient's care.  If you have any questions or concerns please feel free to contact me at [phone]

## 2021-02-09 PROBLEM — Z95.810 HISTORY OF IMPLANTABLE CARDIOVERTER-DEFIBRILLATOR (ICD) PLACEMENT: Status: ACTIVE | Noted: 2021-01-01
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
## 2021-02-09 NOTE — PATIENT INSTRUCTIONS
1. Continue current cardiac medications - please start taking all of your medications as prescirbed EVERY DAY    2. Return in 1 week for BP/HR check - depending on this will continue to adjust your heart failure medication    3. Referral for cardiac rehab    4. Referral for sleep study    5. Please consider to start re-wearing your LifeVest    6.  Return visit with Dr. Jil Willett in 1 month

## 2021-02-09 NOTE — Clinical Note
Titration patient - ESRD committed to HD so can potentially get him on entresto if he can be compliant.

## 2021-02-16 NOTE — TELEPHONE ENCOUNTER
RN Access contacted Dr. Lorene Beavers office too schedule ED f/u appt. Spoke with Alyse Purdy, she stated pt has appt scheduled for 2-22.

## 2021-02-19 NOTE — TELEPHONE ENCOUNTER
Called and spoke with pt via phone regarding missed appt. appt rescheduled and pt notified of same. Appt reminder card mailed to patient.

## 2021-03-10 NOTE — TELEPHONE ENCOUNTER
----- Message from ENMANUEL Joshua CNP sent at 3/9/2021 12:27 PM EST -----  Please see how patient is feeling? Has he been taking his heart medication as prescribed? Who is his nephrologist? If the patient has been complaint with his medications would like to transition lisinopril to [facility] if ok with nephrology. Thank you.

## 2021-03-10 NOTE — TELEPHONE ENCOUNTER
Taking meds as prescribed. Kidney DR-    DR Delgado  Saw few weeks ago. Dr saw him at dialysis center. Per patient     Feels good, weights good. Says vitals stable doesn't know his vitals though. Dialysis manages this and his weights. .   [address] ? He thinks is address. [place]   (between [facility] and [place])    Faxed DR Delgado for input on transitioning lisinopril to entresto. Patient is aware will only call him back if DR Delgado approves the med change.

## 2021-03-12 NOTE — TELEPHONE ENCOUNTER
[facility]) [phone]   (R) [phone]        Called DR Delgado office  Office is closed.      [facility]  [phone]    Fax# is [phone]   Faxed for vitals/ weight and for Dialysis center to address request to transition to entresto from lisinopril (dr Delgado see's him at dialysis center)

## 2021-03-18 NOTE — TELEPHONE ENCOUNTER
----- Message from ENMANUEL Patterson CNP sent at 3/16/2021  1:18 PM EDT -----  Received clearance from Nephrology to transition Lisinopril to [facility] given that patient is committed to dialysis. Therefore please have patient stop lisinopril and let this wash out of his system for 36 hours. Then start low dose Entresto     Follow up vitals from dialysis in 1-2 weeks    Will plan to continue to titrate Entresto to high dose as tolerated -- if drop in BP, tentatively can plan to decrease hydralazine/isordil dosing to allow BP room for titration.      Thank you.   ----- Message -----  From: Kaleigh Mayes MA  Sent: 3/15/2021   2:36 PM EDT  To: ENMANUEL Patterson CNP

## 2021-03-18 NOTE — TELEPHONE ENCOUNTER
[facility] called. He is monthly labs. Not due till mid April again. Only draw the renal ordered labs. They will give him cardiology scripts at next dialysis session to have done at lab of choice in 1-2 weeks. They will fax BARBIE Duke CNP office vitals in 2 weeks.      Yvonne Lazaro RN

## 2021-03-26 NOTE — TELEPHONE ENCOUNTER
Left voice message with reminder to have cardiology f/u labs after starting entresto. Dialysis center will not draw.      Provided call back if any questions    Juan Lewis RN

## 2021-03-30 NOTE — PROGRESS NOTES
REVISION Right 2020    AV SHUNT INSERTION REVISION performed by Michelle Tarango MD at [address]     Current Medications:   Prior to Admission medications    Medication Sig Start Date End Date Taking? Authorizing Provider   sacubitril-valsartan (ENTRESTO) 24-26 MG per tablet Take 1 tablet by mouth 2 times daily 3/18/21  Yes Swetha Huntley APRN - CNP   metoprolol succinate (TOPROL XL) 50 MG extended release tablet Take 1 tablet by mouth daily 2/15/21  Yes Scooby Cao APRN - CNP   isosorbide mononitrate (IMDUR) 30 MG extended release tablet Take 1 tablet by mouth daily 21  Yes Hooker Senna, DO   hydrALAZINE (APRESOLINE) 25 MG tablet Take 3 tablets by mouth every 8 hours 21  Yes Paula Rojo, DO   Calcium Acetate, Phos Binder, 667 MG CAPS Take 1,334 mg by mouth 3 times daily (with meals)  20  Yes Historical Provider, MD   lidocaine-prilocaine (EMLA) 2.5-2.5 % cream APPLY SMALL AMOUNT TO ACCESS SITE (AVF) 1 HOUR BEFORE DIALYSIS.  COVER WITH OCCLUSIVE DRESSING (SARAN WRAP) 20  Yes Historical Provider, MD     Allergies:  Vancomycin    Social History     Socioeconomic History    Marital status: Single     Spouse name: Not on file    Number of children: Not on file    Years of education: Not on file    Highest education level: Not on file   Occupational History    Occupation: unemployed   Social Needs    Financial resource strain: Not on file    Food insecurity     Worry: Not on file     Inability: Not on file   [employer] needs     Medical: Not on file     Non-medical: Not on file   Tobacco Use    Smoking status: Former Smoker     Packs/day: 1.00     Years: 20.00     Pack years: 20.00     Types: Cigarettes     Quit date: 2013     Years since quittin.3    Smokeless tobacco: Never Used   Substance and Sexual Activity    Alcohol use: No     Alcohol/week: 0.0 standard drinks    Drug use: Yes     Frequency: 7.0 times per week     Types: Marijuana     Comment: uses No [x]/Yes []  Neurologic:             Numbness:   No [x]/Yes []      Paralysis:   No [x]/Yes []                       Headaches:   No [x]/Yes []  Hematologic, lymphatic:   Anemia:   No [x]/Yes []              Bleeding or bruising:  No [x]/Yes []              Fevers or chills: No [x]/Yes []  Endocrine:             Temp intolerance:   No [x]/Yes []                       Polydipsia, polyuria:  No [x]/Yes []  Skin:              Rash:    No [x]/Yes []      Ulcers:   No [x]/Yes []              Abnorm pigment: No [x]/Yes []  :              Frequency/urgency:  No [x]/Yes []      Hematuria:    No [x]/Yes []                      Incontinence:    No [x]/Yes []    PHYSICAL EXAM:  There were no vitals filed for this visit.   General Appearance: alert and oriented to person, place and time, in no acute distress, well developed and well- nourished  Neurologic: no cranial nerve deficit, speech normal  Head: normocephalic and atraumatic  Eyes: extraocular eye movements intact, conjunctivae normal  ENT: external ear and ear canal normal bilaterally, nose without deformity  Pulmonary/Chest: normal air movement, no respiratory distress  Cardiovascular: normal rate, regular rhythm, no murmur  Abdomen: non-distended, no masses  Musculoskeletal: no joint deformity or tenderness  Extremities: no leg edema bilaterally  Skin: warm and dry, no rash or erythema  R UE: AVF + thrill, bruit   R LE: R groin palpable bulge worse with coughing and bending over  L LE: soft, fluid filled mass, known seroma     PULSE EXAM      Right      Left   Brachial     Radial 2 2   Femoral     Popliteal     Dorsalis Pedis     Posterior Tibial     (3=normal, 2=diminished, 1=barely palpable, 4=widened)    RADIOLOGY: [facility] today    Problem List Items Addressed This Visit     None      Visit Diagnoses     Inguinal hernia of right side without obstruction or gangrene    -  Primary    Relevant Orders    Carolina Valente MD, General Surgery, [place]        I reviewed with the patient that his right groin pain and swelling may be due to an inguinal hernia. We will refer him to general surgery for evaluation of this. His left groin seroma is stable and he is not experiencing any symptoms from this. I also instructed him to follow up with defibrillator placement. Pt seen and plan reviewed with Dr. Kayleen Rocha. Zoltan Crowell CNP      No follow-ups on file.

## 2021-03-31 NOTE — TELEPHONE ENCOUNTER
Patient was referred by Dr. Genia Armijo for hernia consult. Patient was scheduled on 4/26/21 in [place] office @ 2:00 pm with Dr. Damir Scales. Patient was instructed to bring a photo ID, insurance card (if applicable), and list of any current medications. Patient verbalized understanding of appointment instructions.           Electronically signed by Allan Solano on 3/31/21 at 1:51 PM EDT

## 2021-04-26 NOTE — TELEPHONE ENCOUNTER
MALORIE reyes with office contact information for pt to return call for missed appt with Dr. Allyssa Méndez MA awaiting return call to reschedule missed appt.   Electronically signed by [employer] on 4/26/21 at 3:11 PM EDT

## 2021-04-28 NOTE — TELEPHONE ENCOUNTER
Faxed dialysis center for their monthly labs and most recent vitals    Post entresto initiation    Intent to titrate entresto if able. BARBIE GARRETT.
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
## 2021-05-20 NOTE — TELEPHONE ENCOUNTER
MA received phone call from Julio Lugo with [address] [place] [address] Dr stating she has been helping with the care of the patient. To help keep track of his health care, they are requesting notes from future appointments as he has a hard time keeping track of appointments and dates. MA confirmed Meenu's contact information. Ph:[phone]  Fx: [phone].     Electronically signed by Raymond Pinzon on 5/20/21 at 9:57 AM EDT

## 2021-05-27 PROBLEM — C67.9 BLADDER CANCER (HCC): Chronic | Status: ACTIVE | Noted: 2020-01-01

## 2021-05-27 PROBLEM — I50.43 ACUTE ON CHRONIC COMBINED SYSTOLIC AND DIASTOLIC CONGESTIVE HEART FAILURE (HCC): Status: RESOLVED | Noted: 2020-01-01 | Resolved: 2021-01-01

## 2021-05-27 PROBLEM — I50.43 ACUTE ON CHRONIC COMBINED SYSTOLIC AND DIASTOLIC CHF (CONGESTIVE HEART FAILURE) (HCC): Status: RESOLVED | Noted: 2021-01-01 | Resolved: 2021-01-01

## 2021-05-27 PROBLEM — G47.00 INSOMNIA: Status: RESOLVED | Noted: 2020-01-01 | Resolved: 2021-01-01

## 2021-05-27 PROBLEM — L76.34 POSTOPERATIVE SEROMA OF SUBCUTANEOUS TISSUE AFTER NON-DERMATOLOGIC PROCEDURE: Status: RESOLVED | Noted: 2020-01-01 | Resolved: 2021-01-01

## 2021-05-27 PROBLEM — I48.91 ATRIAL FIBRILLATION (HCC): Chronic | Status: ACTIVE | Noted: 2020-01-01

## 2021-05-27 PROBLEM — E87.6 HYPOKALEMIA: Status: RESOLVED | Noted: 2020-01-01 | Resolved: 2021-01-01

## 2021-05-27 PROBLEM — R55 SYNCOPE: Status: RESOLVED | Noted: 2020-01-01 | Resolved: 2021-01-01

## 2021-05-27 PROBLEM — R18.8 INGUINAL FLUID COLLECTION: Status: RESOLVED | Noted: 2020-01-01 | Resolved: 2021-01-01

## 2021-05-27 PROBLEM — Z91.199 NON-COMPLIANCE: Status: RESOLVED | Noted: 2020-01-01 | Resolved: 2021-01-01

## 2021-05-27 PROBLEM — S82.891A CLOSED FRACTURE OF RIGHT ANKLE: Status: RESOLVED | Noted: 2020-01-01 | Resolved: 2021-01-01

## 2021-05-27 PROBLEM — J69.0 ACUTE ASPIRATION PNEUMONIA (HCC): Status: RESOLVED | Noted: 2020-01-01 | Resolved: 2021-01-01

## 2021-05-27 PROBLEM — I16.0 HYPERTENSIVE URGENCY: Status: RESOLVED | Noted: 2020-01-01 | Resolved: 2021-01-01
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
## 2021-05-27 PROBLEM — E16.2 HYPOGLYCEMIA: Status: RESOLVED | Noted: 2020-07-13 | Resolved: 2021-01-01

## 2021-05-27 PROBLEM — Z95.810 HISTORY OF IMPLANTABLE CARDIOVERTER-DEFIBRILLATOR (ICD) PLACEMENT: Status: RESOLVED | Noted: 2021-01-01 | Resolved: 2021-01-01

## 2021-05-27 NOTE — Clinical Note
Call patient let him know that after he left the office I reviewed his chart further and he will need to have cardiology see him for clearance before either colonoscopy or inguinal hernia repair. Did not schedule him until we get this clearance. Thank you!     Electronically signed by Jessika Regalado MD on 5/27/21 at 8:19 PM EDT

## 2021-05-27 NOTE — PATIENT INSTRUCTIONS
Dr. Larry Varela recommended colonoscopy with possible biopsy or polypectomy and he explained the risk, benefits, expected outcome, and alternatives to the procedure. Risks included but are not limited to bleeding, infection, respiratory distress, hypotension, and perforation of the colon. You understood and were in agreement. You will need to have someone bring you to the hospital and take you home because you will not be able to drive or work the rest of that day. Also, you need to have someone stay with you the rest of the day to make sure you do not develop any complications. [address] [place] [address]  Polyethylene Glycol-Electrolyte Solution (such as Golytely, Nulytely, etc.) Split Prep  COLON PREP FOR COLONOSCOPY OR COLON SURGERY    It is very important that you follow all of the instructions listed on this sheet carefully (they may be slightly different than the directions on the product that you purchase at the pharmacy) to ensure that your colon is adequately cleaned out or your risk of complications could be increased. 2 Days or More Before Endoscopy:   Obtain Polyethylene Glycol-Electrolyte Solution (PEG-ES) prep depending on the prescription the surgeon gave you, from the pharmacy (call the pharmacy ahead of time if requesting flavored product to ensure they have it in stock).  Mix PEG-ES prep according to instructions and refrigerate.  Do not eat corn, tomatoes, peas or watermelon 5 days before procedure.  If you are on INSULIN or OTHER DIABETIC MEDICATIONS, then check with your primary care physician as to how to adjust your medication while on clear liquid diet and when nothing by mouth. 1 Day Before the Endoscopy:   No solid food  only clear liquids (soup, jello, or juice that you can see through with no solid food) for breakfast, lunch and supper. DO NOT drink or eat anything that is red as it will turn the inside of the colon red and look like blood.   Nothing to eat or drink after midnight except as instructed below.  Begin drinking the PEG-ES prep early in the afternoon (between 1 pm and 4 pm  the earlier the better). Drink an 8 oz glass of this solution every 10 minutes until you have drank 12 glasses. It is best to drink the whole glass rapidly rather than sipping small amounts continuously. This will leave four 8 oz glasses for in the morning prior to your endoscopy, keep it refrigerated.  Bowel movements should occur about one hour after the first glass of PEG-ES prep. They will continue periodicals for approximately 1-2 hours after you finish drinking the last glass. By this time the stool should be liquid and clear.  Feelings of bloating and/or nausea are common after the first few glasses because of the large volume of fluid ingested. This is temporary, however, and will improve once bowel movements begin. If you have nausea or vomiting, notify your physician. Day of Endoscopy:   Nothing to eat or drink after midnight the night before the endoscopy until 5 hours before the scheduled time for your endoscopy. Then drink the remaining prep again taking 8 oz glass every 10 minutes until you have drank the remaining four glasses. You may take an 8 oz glass of water after your last glass of prep. Nothing to drink for 3 hours prior to your endoscopy.  If you are take any blood pressure medications or heart medications in the morning, you should taking them with a sip of water prior to leaving for the hospital for your endoscopy. Patient Information and Instructions for Colonoscopy         Definition of Colonoscopy   A colonoscopy is the visual exam of the rectum and colon (large intestine). The exam is done with a tool called a colonoscope. The colonoscope is a flexible tube with a tiny camera on the end. This instrument allows the doctor to view the inside of your rectum and colon.   Sigmoidoscopy is a shorter scope that views only the last one third of the colon. Reasons for Colonoscopy   It is used to examine, diagnose, and treat problems in your large intestine. The procedure is most often done for the following reasons: To determine the cause of abdominal pain, rectal bleeding, or a change in bowel habits   To detect and treat colon cancer or colon polyps   To obtain tissue samples for testing   To stop intestinal bleeding   Monitor response to treatment if you have inflammatory bowel disease     Possible Complications   Complications are rare, but no procedure is completely free of risk. If you are planning to have a colonoscopy, your doctor will review a list of possible complications, which may include:   Bleeding   Reaction to the sedation causing drop in your blood pressure or problems breathing  Perforation or puncture of the bowel     Factors that may increase the risk of complications include:   Pre-existing heart or kidney condition   Treatment with certain medicines, including aspirin and other drugs with anticoagulant or blood-thinning properties   Prior abdominal surgery or radiation treatments   Active colitis , diverticulitis , or other acute bowel disease   Previous treatment with radiation therapy     Be sure to discuss these risks with your doctor before the procedure. What to Expect   Prior to Procedure   Your doctor will likely do the following:   Physical exam   Health history   Review of medicines   Test your stool for hidden blood (called \"occult blood\")     Your colon must be completely clean before the procedure. Any stool left in the intestine will block the view. This preparation may start several days before the procedure. Follow your doctor's instructions. Leading up to your procedure:   Talk to your doctor about your medicines.  You may be asked to stop taking some medicines up to one week before the procedure, like:   Anti-inflammatory drugs (e.g., aspirin )   Blood thinners like clopidogrel (Plavix) or warfarin Avoid driving, operating machinery, or making important decisions for the rest of the day. Rest for the remainder of the day. After arriving home, contact your doctor if any of the following occurs:   Bleeding from your rectum, notify your doctor if you pass a teaspoonful of blood or more. Black, tarry stools   Severe abdominal pain   Hard, swollen abdomen   Signs of infection, including fever or chills   Inability to pass gas or stool   Coughing, shortness of breath, chest pain, severe nausea or vomiting     In case of an emergency, CALL 911 . Dr. Kevin Ramirez recommended right inguinal herniorrhaphy with mesh and explained the risk, benefits, expected outcome, and alternatives to the procedure. Risks included but are not limited to bleeding, infection, damage to the structure going to and from the testicle including the blood vessels (decrease function of testicle) and the vas (that carries the sperm), and recurrence of the hernia. You understood and requested to proceed with the surgery. You will need to have someone bring you to the hospital and take you home because you will not be able to drive or work the rest of that day. Also, you need to have someone stay with you the rest of the day to make sure you do not develop any complications. This will be scheduled sometime after your colonoscopy.   0

## 2021-05-27 NOTE — PROGRESS NOTES
History and Physical    Patient's Name/Date of Birth: Emelia Hall /1965, (50 y.o.), male    Date: May 27, 2021     Assessment/Plan:  1. Reducible, mildly symptomatic, right inguinal hernia - I recommended inguinal hernia repair with mesh and explained the risk, benefits, expected outcome, and alternatives to the procedure. Risks included but are not limited to bleeding, infection, damage to the structure going to and from the testicle including the blood vessels (decrease function of testicle) and the vas (that carries the sperm), and recurrence of the hernia. I informed the patient that I performed the surgery via the open technique however, informed him that if you would like to have this done laparoscopically I can set him up with one of my partners to perform the surgery using that technique. Patient understood and requested to have me perform the surgery with the open technique. However, informed the patient that there is an association tween inguinal hernias and colon cancer. Since he is over age 50 and has never had a colonoscopy, I recommend he have this performed prior to the hernia surgery. Patient understood and is in agreement. 2. Colorectal cancer screening - I recommended low risk screening colonoscopy with possible biopsy or polypectomy and explained the risk, benefits, expected outcome, and alternatives to the procedure. Risks included but are not limited to bleeding, infection, respiratory distress, hypotension, and perforation of the colon. The patient understands and is in agreement. 3. History of atrial fibrillation - patient's cardiac rhythm on physical exam seems to be irregularly irregular today. His last EKG in the chart is from 2/9/2021 and it showed sinus rhythm. I will contact the patient's PCP to see if she wants to see the patient prior to patient's endoscopy.   4. History of nonischemic cardiomyopathy, valvular heart disease with systolic ejection murmur, and history external defibrillator with attached monitor     Life Vest used     V-tach ([address]     hx of       Past Surgical History:   Procedure Laterality Date    ABDOMINAL AORTIC ANEURYSM REPAIR, ENDOVASCULAR N/A 06/22/2020    ABDOMINAL AORTIC ANEURYSM REPAIR ENDOVASCULAR performed by Ferdinand Schuler MD at [facility] 06/22/2020    CYSTOSCOPY of BLADDER TUMOR performed by Mando Parker MD at [facility] 06/23/2020    CYSTOSCOPY, RETROGRADE PYELOGRAM, TRANSURETHRAL RESECTION BLADDER TUMOR, INSTILLATION OF MITOMYCIN-C performed by Judith Morley MD at [address] Right 02/19/2016    RCAshly    HC DIALYSIS CATHETER N/A 11/02/2020    CATHETER INSERTION  TUNNELED HEMODIALYSIS performed by Ferdinand Schuler MD at [address] [place] [address] Left 12/09/2020    INCISION AND DRAINAGE LEFT GROIN SEROMA performed by Ferdinand Schuler MD at [address] [place] [address],[facility] Floor Right 11/02/2020    AV SHUNT INSERTION REVISION performed by Ferdinand Schuler MD at [facility] OR       Current Outpatient Medications   Medication Sig Dispense Refill    dronabinol (MARINOL) 5 MG capsule Take by mouth.  sevelamer (RENVELA) 800 MG tablet Take 2 tablets by mouth 3 times daily (with meals)      lidocaine-prilocaine (EMLA) 2.5-2.5 % cream Apply 1 Dose topically as needed Apply topically to fistula prior to dialysis      cinacalcet (SENSIPAR) 30 MG tablet Take 30 mg by mouth daily      sacubitril-valsartan (ENTRESTO) 24-26 MG per tablet Take 1 tablet by mouth 2 times daily 60 tablet 11    metoprolol succinate (TOPROL XL) 50 MG extended release tablet Take 1 tablet by mouth daily 30 tablet 0    isosorbide mononitrate (IMDUR) 30 MG extended release tablet Take 1 tablet by mouth daily 30 tablet 3    hydrALAZINE (APRESOLINE) 25 MG tablet Take 3 tablets by mouth every 8 hours 90 tablet 3     No current facility-administered medications for this visit.        Allergies   Allergen Reactions motion. Cervical back: Normal range of motion. Comments: Limping due to chronic right ankle pain from fracture from [facility] 8/2020   Skin:     General: Skin is warm and dry. Coloration: Skin is not pale. Findings: No erythema or rash. Neurological:      Mental Status: He is alert and oriented to person, place, and time.    Psychiatric:         Behavior: Behavior normal.         Judgment: Judgment normal.     Electronically signed by Harry Gonzalez MD on 5/27/21 at 3:15 PM EDT

## 2021-05-28 NOTE — TELEPHONE ENCOUNTER
----- Message from Vamsi Gilliland MD sent at 5/27/2021  8:19 PM EDT -----  Call patient let him know that after he left the office I reviewed his chart further and he will need to have cardiology see him for clearance before either colonoscopy or inguinal hernia repair. Did not schedule him until we get this clearance. Thank you! Electronically signed by Vamsi Gilliland MD on 5/27/21 at 8:19 PM EDT
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
## 2021-05-28 NOTE — TELEPHONE ENCOUNTER
MA called Bailee Banks, pt's  and informed her of situation, Bailee Banks states that she will see pt in person on Tuesday and will assist pt in making appt with cardiology as pt sees [facility] and she will let the office know when pt Is scheduled so we can begin scheduling for colonoscopy then surgery to follow. MA faxed office notes to Bailee Banks as well. MA called pt and informed him that he will need to see cardiology prior to scheduling scope/surgery and pt verbalized understanding.     Electronically signed by Sarah Kirkpatrick on 5/28/21 at 10:43 AM EDT

## 2021-06-01 NOTE — TELEPHONE ENCOUNTER
Pt's  called to schedule the appointment and said he is extremely forgetful and she would like him to have a copy of the notes.
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
## 2021-06-08 NOTE — TELEPHONE ENCOUNTER
Prior Authorization Form:      DEMOGRAPHICS:                     Patient Name:  Pablo Florian  Patient :  1965            Insurance:  Payor: [facility]  [address] / Plan: [facility] [address] / Product Type: *No Product type* /   Insurance ID Number:    Payor/Plan Subscr  Sex Relation Sub.  Ins. ID Effective Group Num   1. ANDREA BASS* [place] 1965 Male Self [id number]                                    [address]         DIAGNOSIS & PROCEDURE:                       Procedure/Operation: LOW SCREEN COLONOSCOPY           CPT Code: 81388    Diagnosis:  SCREENING    ICD10 Code: Z12.11    Location:  [facility]    Surgeon:  DR. Vanesa Almanza    SCHEDULING INFORMATION:                          Date: 21    Time: 7:30 AM              Anesthesia:  LMAC                                                       Status:  Outpatient        Special Comments:  N/A       Electronically signed by Jarvis Monae on 2021 at 10:45 AM
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
## 2021-07-27 NOTE — PROGRESS NOTES
Spoke with Dr. Lamine Coyle regarding [facility] instructions. Patient to hold Entresto morning of procedure.

## 2021-07-28 NOTE — PROGRESS NOTES
[address] PRE-ADMISSION TESTING ENDOSCOPY INSTRUCTIONS- [facility]-phone number:[phone]    ENDOSCOPY INSTRUCTIONS:   [x] Bowel prep instructions reviewed. [x] Nothing by mouth after midnight, including gum, candy, mints, or water. Please follow your surgeons instructions if you are required to complete a bowel prep. Colonoscopy- no solid food-only clear liquids the day prior). [x] You may brush your teeth, gargle, but do NOT swallow water. [x] Do not wear makeup, lotions, powders, deodorant. Nail polish as directed by the nurse. [x] Arrange transportation with a responsible adult  to and from the hospital. If you do not have a responsible adult  to transport you, you will need to make arrangements with a medical transportation company (i.e. [employer]. A Uber/taxi/bus is not appropriate unless you are accompanied by a responsible adult ). Arrange for someone to be with you for the remainder of the day and for 24 hours after your procedure due to having had anesthesia. Who will be your  for transportation? Patient unsure of ride at this point, explained he is unable to use a taxi service. Pt. Verbalized understanding. Who will be staying with you for 24 hrs after your procedure?__________________    PARKING INSTRUCTIONS:   [x] Arrival Time:_____0630___________________  · [x] Parking lot  \"I\" OR 1 is located on [facility] (the corner of [facility]). To enter, press the button and the gate will lift. A free token will be provided to exit the lot. One car per patient is allowed to park in this lot. All other cars are to park on [address] [place] [address] either in the parking garage or the handicap lot. [] To reach the [place] lobby from [address] [place] [address], upon entering the hospital, take elevator B to the 3rd floor.     EDUCATION INSTRUCTIONS:  [x] Bring a complete list of your medications, please write the last time you took the medicine, give this list to the nurse. [x] Take the following medications the morning of surgery with 1-2 ounces of water:  Imdur & Hydralazine  [x] Stop herbal supplements and vitamins 5 days before your surgery. [] DO NOT take any diabetic medicine the morning of surgery. Follow instructions for insulin the day before surgery. [x] If you are diabetic and your blood sugar is low or you feel symptomatic, you may drink 1-2 ounces of apple juice or take a glucose tablet. The morning of your procedure, you may call the pre-op area if you have concerns about your blood sugar [phone]. [] Use your inhalers the morning of surgery. Bring your emergency inhaler with you day of surgery. [x] Follow physician instructions regarding any blood thinners you may be taking. WHAT TO EXPECT:  [x] The day of your procedure you will be greeted and checked in by the [employer].  In addition, you will be registered in the [facility] by a Patient Access Representative. Please bring your photo ID and insurance card. A nurse will greet you in accordance to the time you are needed in the pre-op area to prepare you for surgery. Please do not be discouraged if you are not greeted in the order you arrive as there are many variables that are involved in patient preparation. Your patience is greatly appreciated as you wait for your nurse. Please bring in items such as: books, magazines, newspapers, electronics, or any other items  to occupy your time in the waiting area. [x]  Delays may occur. Staff will make a sincere effort to keep you informed of delays. If any delays occur with your procedure, we apologize ahead of time for your inconvenience as we recognize the value of your time.

## 2021-07-30 NOTE — LETTER
[address]  [facility], [address]  [phone] (Fax)  August 3, 2021     Elvira Loomis  [facility]      Dear Elvira Loomis:    I reviewed the pathology results from your endoscopic procedure on 7 / 30 / 2021. The biopsies of the rectum just show nonspecific inflammation but no sign of Inflammatory Bowel Disease. So, this does not need any treatment     The colon polyp that was removed was a tubular adenoma (non-cancerous but predisposes to more polyps or developing cancer). I would recommend repeat colonoscopy in 5 years. We will contact you then to return for scheduling for the procedure but you may want to make note of this as well in case we cannot get ahold of you. If you have any questions or concerns, please don't hesitate to call.        Sincerely,      Electronically signed by Rahat Ceja MD on 8/3/21 at 8:17 PM EDT
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
## 2021-07-30 NOTE — OP NOTE
PROCEDURE NOTE    DATE OF PROCEDURE: 7/30/2021    SURGEON: Yves Delgado M.D.    ASSISTANT: None    PREOPERATIVE DIAGNOSIS: Low risk colorectal cancer screening    POSTOPERATIVE DIAGNOSIS: Same with a 10 mm diameter polyp in the sigmoid colon approximate 35 cm from anus and mildly severe proctocolitis of the rectum    OPERATION: Total colonoscopy with biopsy and subsequent snare polypectomy of sigmoid colon polyp    ANESTHESIA: Local monitored anesthesia. ESTIMATED BLOOD LOSS: less than 50     COMPLICATIONS: None. SPECIMENS:  Was Obtained: Sigmoid colon polypectomy    HISTORY: The patient is a 50y.o. year old male with history of above preop diagnosis. I recommended colonoscopy with possible biopsy or polypectomy and I explained the risk, benefits, expected outcome, and alternatives to the procedure. Risks included but are not limited to bleeding, infection, respiratory distress, hypotension, and perforation of the colon. The patient understands and is in agreement. PROCEDURE: The patient was given IV conscious sedation per anesthesia. The patient was given supplemental oxygen by nasal cannula. The colonoscope was inserted per rectum and advanced under direct vision to the cecum with difficulty due to suboptimal prep and severe spasm left colon. Patient was given glucagon 1 mg IV as well as had to be turned on his back in order advance the scope to the cecum. The prep was poor so exam was suboptimal and a small mucosal lesion could have been missed. FINDINGS:  Cecum/Ascending colon: normal    Transverse colon: normal    Descending/Sigmoid colon: abnormal: 10 mm diameter polyp sigmoid colon 35 cm from anus was biopsied and then removed with snare polypectomy and retrieved, otherwise normal    Rectum/Anus: examined in normal and retroflexed positions and was abnormal: Mildly severe proctocolitis that was biopsied    The colon was decompressed and the scope was removed.   The withdraw time was approximately 22 minutes. The patient tolerated the procedure well. ASSESSMENT/PLAN:   1. Sigmoid colon polyp - I will check pathology and contact patient with results and recommendations for follow-up colonoscopy. 2. Proctocolitis rectum - I will check pathology and contact patient with results and any further recommended treatment.     Electronically signed by Erwin Ndiaye MD on 7/30/21 at 8:04 AM EDT

## 2021-07-30 NOTE — ANESTHESIA POSTPROCEDURE EVALUATION
Department of Anesthesiology  Postprocedure Note    Patient: Alexander Weems  MRN: [id number]  YOB: 1965  Date of evaluation: 7/30/2021  Time:  10:36 AM     Procedure Summary     Date: 07/30/21 Room / Location: [address] [place] [address] / [facility]    Anesthesia Start:  Anesthesia Stop:     Procedure: COLORECTAL CANCER SCREENING, NOT HIGH RISK (N/A ) Diagnosis: (SCREENING)    Surgeons: Won Brush MD Responsible Provider:     Anesthesia Type: MAC ASA Status: 4          Anesthesia Type: MAC    Radhames Phase I: Radhames Score: 10    Radhames Phase II: Radhames Score: 10    Last vitals: Reviewed and per EMR flowsheets.        Anesthesia Post Evaluation    Patient location during evaluation: PACU  Patient participation: complete - patient participated  Level of consciousness: awake  Pain score: 3  Airway patency: patent  Nausea & Vomiting: no nausea and no vomiting  Complications: no  Cardiovascular status: blood pressure returned to baseline  Respiratory status: acceptable  Hydration status: euvolemic
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
## 2021-08-07 PROBLEM — S82.891A ANKLE FRACTURE, RIGHT, CLOSED, INITIAL ENCOUNTER: Status: ACTIVE | Noted: 2021-01-01

## 2021-08-07 PROBLEM — S82.842A ANKLE FRACTURE, BIMALLEOLAR, CLOSED, LEFT, INITIAL ENCOUNTER: Status: ACTIVE | Noted: 2021-01-01
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
## 2021-08-07 NOTE — Clinical Note
Patient Class: Inpatient [101]   REQUIRED: Diagnosis: Ankle fracture, right, closed, initial encounter [501951]   Estimated Length of Stay: Estimated stay of more than 2 midnights   Admitting Provider: Dyllan Camargo [[id number]]   Telemetry/Cardiac Monitoring Required?: Yes
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
## 2021-08-07 NOTE — FLOWSHEET NOTE
08/07/21 1607   Vital Signs   /64   Temp 97.3 °F (36.3 °C)   Pulse 100   Resp 18   Pain Assessment   Pain Assessment 0-10   Pain Level 0   Post-Hemodialysis Assessment   Post-Treatment Procedures Blood returned; Access bleeding time < 10 minutes   Machine Disinfection Process Acid/Vinegar Clean;Heat Disinfect; Exterior Machine Disinfection   Rinseback Volume (ml) 300 ml   Total Liters Processed (l/min) 81 l/min   Dialyzer Clearance Moderately streaked   Duration of Treatment (minutes) 240 minutes   Heparin amount administered during treatment (units) 0 units   Hemodialysis Intake (ml) 300 ml   Hemodialysis Output (ml) 600 ml   NET Removed (ml) 3300 ml   Tolerated Treatment Good   Patient Response to Treatment restless entire treatment, profile B, refill noted, 3300cc fluid removal   Bilateral Breath Sounds Diminished   Edema Right lower extremity; Left lower extremity   Physician Notified?  No
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
## 2021-08-08 PROBLEM — R79.1 ELEVATED INR: Status: ACTIVE | Noted: 2021-01-01

## 2021-08-08 PROBLEM — I50.9 CHF (CONGESTIVE HEART FAILURE) (HCC): Status: ACTIVE | Noted: 2021-01-01
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
## 2021-08-08 NOTE — PLAN OF CARE
Problem: Skin Integrity:  Goal: Will show no infection signs and symptoms  Description: Will show no infection signs and symptoms  8/7/2021 1904 by Maryjane Gifford RN  Outcome: Met This Shift  Goal: Absence of new skin breakdown  Description: Absence of new skin breakdown  8/8/2021 0125 by Sabrina Balderas  Outcome: Met This Shift  8/7/2021 1904 by Maryjane Gifford RN  Outcome: Met This Shift     Problem: Falls - Risk of:  Goal: Will remain free from falls  Description: Will remain free from falls  8/7/2021 1904 by Maryjane Gifford RN  Outcome: Met This Shift  Goal: Absence of physical injury  Description: Absence of physical injury  8/8/2021 0125 by Sabrina Balderas  Outcome: Met This Shift  8/7/2021 1904 by Maryjane Gifford RN  Outcome: Met This Shift     Problem: Pain:  Goal: Pain level will decrease  Description: Pain level will decrease  8/7/2021 1904 by Maryjane Gifford RN  Outcome: Met This Shift  Goal: Control of acute pain  Description: Control of acute pain  Outcome: Met This Shift

## 2021-08-08 NOTE — PROGRESS NOTES
Hospitalist Progress Note      SYNOPSIS: Patient admitted on 2021 for right ankle pain. SUBJECTIVE:  Patient seen and examined at bedside in NAD. Pt unable to ambulate without assistance. Also noted to be coagulopathic on labs (elevated INR of 2.8), pt is not on AC or taking supplements, suspect underlying liver pathology possibly 2/2 cardiac hepatopathy. Pt is s/p maintenance HD yesterday. Currently states breathing has improved, denies any chest pain, palpitation. Pt denies any fevers, chills, nausea, vomiting or diarrhea. Pt states pain is well controlled at current. Records reviewed. Stable overnight. No other overnight issues reported. Temp (24hrs), Av.3 °F (36.8 °C), Min:97.3 °F (36.3 °C), Max:99 °F (37.2 °C)    DIET: Diet NPO  CODE: Full Code    Intake/Output Summary (Last 24 hours) at 2021 1542  Last data filed at 2021 1607  Gross per 24 hour   Intake 300 ml   Output 600 ml   Net -300 ml       OBJECTIVE:    BP (!) 96/58   Pulse 115   Temp 99 °F (37.2 °C) (Temporal)   Resp 20   Ht 5' 6\" (1.676 m)   Wt 190 lb (86.2 kg)   SpO2 96%   BMI 30.67 kg/m²     General appearance: No apparent distress, appears stated age and cooperative. On NC  HEENT:  Conjunctivae/corneas clear. Neck: Supple. No jugular venous distention. Respiratory: Clear to auscultation bilaterally, normal respiratory effort  Cardiovascular: Regular rate rhythm, normal S1-S2  Abdomen: Soft, nontender, nondistended  Musculoskeletal: No clubbing, cyanosis, no bilateral lower extremity edema. Brisk capillary refill. Right leg dressing C/D/I  Skin:  No rashes  on visible skin  Neurologic: awake, alert and following commands     ASSESSMENT:  Principal Problem:    Ankle fracture, right, closed, initial encounter  Active Problems:    Essential hypertension    ESRD needing dialysis (HCC)    Elevated INR    CHF (congestive heart failure) (HCC)  Resolved Problems:    * No resolved hospital problems.  *     PLAN:  - Orthopedic surgery c/s noted and appreciated; non-operative management. Follow-up as outpatient.  - PT/OT  - Pain control  - DVT PPX with SCD given elevated INR  - Obtain fibrinogen level  - Suspect coagulopathy (elevated INR) is 2/2 congestive hepatopathy.  - No signs/symptoms of active bleeding. Cont to monitor H/H. Plt stable. - Obtain RUQ US to eval for cirrhosis  - Nephrology for maintenance HD (TTS)  - Cont home anti-HTN  - EP c/s noted and appreciated; pt for outpatient ICD placement  - Monitor vitals     Diet: Renal Diet    DISPOSITION: Med/Surg. PT/OT. Hopeful home within 24 hrs. Medications:  REVIEWED DAILY    Infusion Medications    sodium chloride       Scheduled Medications    metoprolol tartrate  50 mg Oral BID    cinacalcet  30 mg Oral Daily    hydrALAZINE  75 mg Oral 3 times per day    isosorbide mononitrate  30 mg Oral Daily    sacubitril-valsartan  1 tablet Oral BID    sevelamer  1,600 mg Oral TID WC    sodium chloride flush  5-40 mL Intravenous 2 times per day     PRN Meds: perflutren lipid microspheres, sodium chloride flush, sodium chloride, ondansetron **OR** ondansetron, polyethylene glycol, acetaminophen **OR** acetaminophen, HYDROcodone 5 mg - acetaminophen, fentanNYL, melatonin    Labs:     Recent Labs     08/07/21  0515 08/08/21  0605 08/08/21  1114   WBC 8.9 7.9 7.1   HGB 10.3* 10.7* 10.3*   HCT 32.1* 32.5* 31.4*    136 114*       Recent Labs     08/07/21  0515 08/08/21  0605 08/08/21  1114    132 130*   K 3.4* 4.6 4.1   CL 90* 90* 88*   CO2 29 28 29   BUN 31* 29* 31*   CREATININE 6.1* 4.6* 4.9*   CALCIUM 8.8 8.1* 8.1*       Recent Labs     08/08/21  1114   PROT 5.5*   ALKPHOS 84   ALT 5   AST 12   BILITOT 2.3*       Recent Labs     08/07/21  0720 08/08/21  1114   INR 3.5 2.8       No results for input(s): CKTOTAL, TROPONINI in the last 72 hours.     Chronic labs:    Lab Results   Component Value Date    CHOL 95 01/29/2021    TRIG 51 01/29/2021    HDL 54 01/29/2021 LDLCALC 31 01/29/2021    TSH 4.660 (H) 01/28/2021    INR 2.8 08/08/2021    LABA1C 5.8 (H) 01/29/2021       Radiology: REVIEWED DAILY    +++++++++++++++++++++++++++++++++++++++++++++++++  Ivone Bustillo MD  [facility] Physician - [address] [place] [address]  +++++++++++++++++++++++++++++++++++++++++++++++++  NOTE: This report was transcribed using voice recognition software. Every effort was made to ensure accuracy; however, inadvertent computerized transcription errors may be present.
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
## 2021-08-09 NOTE — PROGRESS NOTES
Called by RN in regards to patient found on the floor sitting on the ground  Patient was seen and examined he did not hit his head. Mentation is intact .  Continue to monitor
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
## 2021-08-09 NOTE — PLAN OF CARE
Problem: Skin Integrity:  Goal: Will show no infection signs and symptoms  Description: Will show no infection signs and symptoms  8/8/2021 1843 by Sheryl Chen RN  Outcome: Met This Shift  Goal: Absence of new skin breakdown  Description: Absence of new skin breakdown  8/9/2021 0114 by Lillian Tidwell  Outcome: Met This Shift  8/8/2021 1843 by Sheryl Chen RN  Outcome: Met This Shift     Problem: Falls - Risk of:  Goal: Absence of physical injury  Description: Absence of physical injury  8/8/2021 1843 by Sheryl Chen RN  Outcome: Met This Shift     Problem: Pain:  Goal: Pain level will decrease  Description: Pain level will decrease  8/8/2021 1843 by Sheryl Chen RN  Outcome: Met This Shift  Goal: Control of acute pain  Description: Control of acute pain  8/9/2021 0114 by Lillian Tidwell  Outcome: Met This Shift  8/8/2021 1843 by Sheryl Chen RN  Outcome: Met This Shift  Goal: Control of chronic pain  Description: Control of chronic pain  8/8/2021 1843 by Sheryl Chen RN  Outcome: Met This Shift

## 2021-08-09 NOTE — PROGRESS NOTES
Hospitalist Progress Note      SYNOPSIS: Patient admitted on 2021 for right ankle pain. SUBJECTIVE:  Patient seen and examined at bedside in NAD. Pt noted to have RUE swelling overnight then noted to have a suspected fall after being found sitting on floor. Pt not noted to have any new trauma or focal deficits. Pt currently AAO x 4, RUE without DVT on US. Vascular surgery was consulted, likely dependent edema, no intervention. INR also down-trending, H/H stable. Pt denies any chest pain or palpitations. Pt denies any fevers, chills, nausea, vomiting or diarrhea. Pt states pain is well controlled at current. Records reviewed. Stable overnight. No other overnight issues reported. Temp (24hrs), Av.4 °F (36.9 °C), Min:98 °F (36.7 °C), Max:98.6 °F (37 °C)    DIET: ADULT DIET; Regular; 5 carb choices (75 gm/meal); No Added Salt (3-4 gm); Low Potassium (Less than 3000 mg/day); Low Phosphorus (Less than 1000 mg)  CODE: Full Code  No intake or output data in the 24 hours ending 21 1158    OBJECTIVE:    BP 99/68   Pulse 92   Temp 98 °F (36.7 °C) (Oral)   Resp 18   Ht 5' 6\" (1.676 m)   Wt 190 lb (86.2 kg)   SpO2 98%   BMI 30.67 kg/m²     General appearance: No apparent distress, appears stated age and cooperative. On NC  HEENT:  Conjunctivae/corneas clear. Neck: Supple. No jugular venous distention. Respiratory: Clear to auscultation bilaterally, normal respiratory effort  Cardiovascular: Regular rate rhythm, normal S1-S2  Abdomen: Soft, nontender, nondistended  Musculoskeletal: No clubbing, cyanosis, no bilateral lower extremity edema. Brisk capillary refill. Right leg dressing C/D/I. RUE with dependent edema.  Right radial AVF with palpable thrill  Skin:  No rashes  on visible skin  Neurologic: awake, alert and following commands     ASSESSMENT:  Principal Problem:    Ankle fracture, right, closed, initial encounter  Active Problems:    Essential hypertension    ESRD needing dialysis ([facility] [address]    Elevated INR    CHF (congestive heart failure) ([facility] [address]  Resolved Problems:    * No resolved hospital problems. *     PLAN:  - Orthopedic surgery c/s noted and appreciated; non-operative management of right foot. Follow-up as outpatient.  - PT/OT  - Pain control  - DVT PPX with SCD given elevated INR  - Suspect coagulopathy (elevated INR) is 2/2 congestive hepatopathy.  - No signs/symptoms of active bleeding. Cont to monitor H/H. Plt stable. - RUQ US with hepatomegaly, likely hepatic steatosis with component of cardiac hepatopathy. No hepatic mass or intrahepatic ductal dilatation. Patent portal vein. - Nephrology for maintenance HD (TTS)  - Cont home anti-HTN  - EP c/s noted and appreciated; pt for outpatient ICD placement  - Monitor vitals  - RUE with dependent edema, elevated limb. US negative for RUE DVT  - Right radial AVF with palpable thrill  - Vascular surgery c/s noted and appreciated; no further intervention/work-up  - Ensure fall precautions    Diet: Renal Diet    DISPOSITION: Med/Surg. PT/OT.     Medications:  REVIEWED DAILY    Infusion Medications    sodium chloride       Scheduled Medications    metoprolol tartrate  50 mg Oral BID    cinacalcet  30 mg Oral Daily    hydrALAZINE  75 mg Oral 3 times per day    isosorbide mononitrate  30 mg Oral Daily    sacubitril-valsartan  1 tablet Oral BID    sevelamer  1,600 mg Oral TID WC    sodium chloride flush  5-40 mL Intravenous 2 times per day     PRN Meds: perflutren lipid microspheres, sodium chloride flush, sodium chloride, ondansetron **OR** ondansetron, polyethylene glycol, acetaminophen **OR** acetaminophen, HYDROcodone 5 mg - acetaminophen, melatonin    Labs:     Recent Labs     08/08/21  0605 08/08/21  1114 08/09/21  0454   WBC 7.9 7.1 4.8   HGB 10.7* 10.3* 10.2*   HCT 32.5* 31.4* 31.5*    114* 88*       Recent Labs     08/08/21  0605 08/08/21  1114 08/09/21  0454    130* 134   K 4.6 4.1 4.3   CL 90* 88* 91*   CO2 28 29 27   BUN 29* 31* 42*   CREATININE 4.6* 4.9* 5.7*   CALCIUM 8.1* 8.1* 8.4*       Recent Labs     08/08/21  1114 08/09/21  0454   PROT 5.5* 6.1*   ALKPHOS 84 78   ALT 5 7   AST 12 15   BILITOT 2.3* 2.5*       Recent Labs     08/07/21  0720 08/08/21  1114 08/09/21  0454   INR 3.5 2.8 2.1       No results for input(s): Allen Oiler in the last 72 hours. Chronic labs:    Lab Results   Component Value Date    CHOL 95 01/29/2021    TRIG 51 01/29/2021    HDL 54 01/29/2021    LDLCALC 31 01/29/2021    TSH 4.660 (H) 01/28/2021    INR 2.1 08/09/2021    LABA1C 5.8 (H) 01/29/2021       Radiology: REVIEWED DAILY    +++++++++++++++++++++++++++++++++++++++++++++++++  Concha Gonzalez MD  [facility] [place] Physician - [address] [place], [place]  +++++++++++++++++++++++++++++++++++++++++++++++++  NOTE: This report was transcribed using voice recognition software. Every effort was made to ensure accuracy; however, inadvertent computerized transcription errors may be present.

## 2021-08-09 NOTE — PROGRESS NOTES
Nutrition Assessment     Type and Reason for Visit: Initial, Positive Nutrition Screen    Nutrition Recommendations/Plan: Continue current diet     Nutrition Assessment:  Pt adm w/ ankle fx s/p fall. Noted ESRD on HD. Hx bladder CA, CHF. Will start ONS and monitor    Malnutrition Assessment:  Malnutrition Status: No malnutrition    Nutrition Related Findings: pt alert, abd distention, active BS, no noted edema, fluids WNL      Current Nutrition Therapies:    ADULT DIET; Regular; 5 carb choices (75 gm/meal); No Added Salt (3-4 gm); Low Potassium (Less than 3000 mg/day); Low Phosphorus (Less than 1000 mg)    Anthropometric Measures:  · Height: 5' 6\" (167.6 cm)  · Current Body Wt: 190 lb (86.2 kg) (estimated, UTO updated CBW d/t pt position)   · BMI: 30.7    Nutrition Diagnosis:   No nutrition diagnosis at this time     Nutrition Interventions:   Food and/or Nutrient Delivery:  Continue Current Diet  Nutrition Education/Counseling:  Education not indicated   Coordination of Nutrition Care:  Continue to monitor while inpatient    Goals:  Consume >75% meals       Nutrition Monitoring and Evaluation:   Food/Nutrient Intake Outcomes:  Diet Advancement/Tolerance, Food and Nutrient Intake  Physical Signs/Symptoms Outcomes:  Biochemical Data, GI Status, Fluid Status or Edema, Nutrition Focused Physical Findings, Skin, Weight     Discharge Planning:     Too soon to determine     Electronically signed by Eri Manzano, MS, RD, LD on 8/9/21 at 2:54 PM EDT    Contact: [phone]
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
## 2021-08-09 NOTE — PROGRESS NOTES
Patient was found on the floor sitting on the ground. Patient stated that he just sat down and denies hitting his head. Vitals were taken and there were no signs of injury. Nurse notified Dr. Roberta Bowers of fall, no orders given at this time. Nurse notified the nursing supervisor and called Lili Mcelroy, daniel castroece, about what happened. Educated the patient on waiting for staff to help him get up since he is weak, already has a fractured ankle and has had falls at home recently. Will continue to monitor patient.

## 2021-08-09 NOTE — PROGRESS NOTES
Vascular Surgery Progress Note    CC: R UE swelling    HISTORY:  The patient is awake, alert. He denies any right arm pain. States he thinks that his R arm AVG is working for dialysis but isn't sure. Feels like his swelling is improving some. Discussed with dialysis department, AVG functioned well for dialysis Saturday without any issues. IMPRESSION:  R UE swelling    PLAN:  Encouraged R arm elevation. Continue use of R arm AVG. No plan on intervention at this time unless there are issues with using the AVG for dialysis treatments.      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    VHD (valvular heart disease) I38    Nonischemic cardiomyopathy ([facility]) I42.8    Screening for colorectal cancer Z12.11, Z12.12    History of colon polyps Z86.010    Ankle fracture, right, closed, initial encounter S82.891A    Ankle fracture, bimalleolar, closed, left, initial encounter S82.842A    Elevated INR R79.1    CHF (congestive heart failure) ([facility]) I50.9       Current Medications:    sodium chloride        perflutren lipid microspheres, sodium chloride flush, sodium chloride, ondansetron **OR** ondansetron, polyethylene glycol, acetaminophen **OR** acetaminophen, HYDROcodone 5 mg - acetaminophen, melatonin    metoprolol tartrate  50 mg Oral BID    cinacalcet  30 mg Oral Daily    hydrALAZINE  75 mg Oral 3 times per day    isosorbide mononitrate  30 mg Oral Daily    sacubitril-valsartan  1 tablet Oral BID    sevelamer  1,600 mg Oral TID WC    sodium chloride flush 5-40 mL Intravenous 2 times per day          PHYSICAL EXAM:    Vitals:    08/09/21 0815   BP: 99/68   Pulse: 92   Resp: 18   Temp: 98 °F (36.7 °C)   SpO2: 98%     CONSTITUTIONAL:  awake, alert, cooperative, no apparent distress  LUNGS:  no increased work of breathing, good air exchange   CARDIOVASCULAR:  regular rate and rhythm  ABDOMEN:  soft, non-distended and non-tender  R UE: Forearm straight AVG with + thrill. Some pulsatility proximally.     LABS:    Lab Results   Component Value Date    WBC 4.8 08/09/2021    HGB 10.2 (L) 08/09/2021    HCT 31.5 (L) 08/09/2021    PLT 88 (L) 08/09/2021    PROTIME 22.8 (H) 08/09/2021    INR 2.1 08/09/2021    APTT 34.0 08/09/2021    K 4.3 08/09/2021    BUN 42 (H) 08/09/2021    CREATININE 5.7 (H) 08/09/2021       RADIOLOGY:
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
## 2021-08-09 NOTE — PROGRESS NOTES
Patient is setting off bed alarm continuously, patient was educated on not getting up without staff due to previous fall. Pt states that he did not fall and that he is fine. Patient is uncooperative and not listening to staff about his safety.

## 2021-08-10 NOTE — PROGRESS NOTES
Physical Therapy    PT session on hold this date due to change in medical status and decreased alertness.  RN in agreement stating pt is not medically appropriate to attempt mobility this PM.    Rosemary Betancourt, PT, DPT  [id number]

## 2021-08-10 NOTE — PROGRESS NOTES
Dr. Andrez Rome regarding patient change in mental status today. Patient moaning, not as responsive as yesterday. Patient hypotensive, low blood glucose, tachycardic.   Patient's right arm swollen up into chest.
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
## 2021-08-10 NOTE — PROCEDURES
Procedure: Left radial arterial line placement. Indications: Continuous monitoring of blood pressure in a patient with hypotension +/- shock, on Levophed. Anesthesia: Local infiltration of 1% lidocaine. Consent:  Informed written obtained. Technique:  Procedure was done using strict aseptic technique. /Left radial site was cleaned with chloraprep and draped. Radial artery was identified, then Lidocaine 1% was infiltrated locally. Radial arterial line was inserted, a good blood flow was obtained, after which guidewire was inserted all the way with no resistance. Then the canula was inserted and needle with guidewire was withdrawn. Pulsatile bright red blood flow was observed. The canula was connected to BP monitoring apparatus and a good waveform was noted. Then the canula was secured with 2 stay sutures of 3-0 silk after Lidocaine infiltration, following which dressing was applied. Number of sticks: 1. Number of Kits used: 1. Complications: No immediate complication. Estimated blood loss: About 1 ml. Comment: Patient tolerated the procedure well. Dilip Shankar MD,PGY-3    Supervised by Dr. Uzair Gale    I personally supervised the performance of the documented procedure. I was present for the critical elements of this procedure and was immediately available for the entire procedure.   Shaquille Thomas MD,[facility]  Pulmonary&Critical Care Medicine   Director of Lung Nodule Center  Medical Director of [address] [place] [address]    Bernardo Vasques

## 2021-08-10 NOTE — SIGNIFICANT EVENT
Pupillary Response:  +   Response to pain:   [x] Yes  [] No  Follow commands:  [] Yes  [x] No  Facial asymmetry:  [] Yes  [x] No  Motor strength:  [] Equal  [] Focal deficit:   Diagnostic Test:  Blood Sugar:  80 mg/dL    ABG:      Lab Results   Component Value Date    PH 7.456 08/10/2021    PCO2 43.1 08/10/2021    PO2 78.8 08/10/2021    HCO3 29.7 08/10/2021    O2SAT 95.1 08/10/2021     Medication(s) Given:  []  Narcan (Naloxone)   []  Romazicon (Flumazenil)    []  Breathing Treatment    []  Steroids (Prednisone, Solu-Medrol)  []  Adenosine  [] Cardiac Medicines:     Infusion(s):   [x] Normal Saline   Rate:  bolus      [] Lactate Ringers      [] Other:     Imaging:   [x] CXR:   [] Normal         [] Pneumothorax         [x] Pulmonary Edema  [] Infiltrate          [] CT Head  [] Normal          [] ICB          [] SAH          [] Other:       Teams Assessment and Plan:  1. AMS, plan to transfer to ICU  2. Hypotension, 2L NS  3. Hypoglycemia, started on D10  ? Disposition:  [] No transfer   [] Transfer to monitor floor  [x] Transfer to: [x] MICU [] NICU [] CCU [] SICU    Patients family updated: [x] Yes  [] No   Discussed with:  [x] Critical Care Intensivist: Dr. Delia Pelletier       [] Primary Care Provider: No primary care provider on file.       [] Other: ?    Daniel Abdul MD , MD PGY 3    4:21 PM  Attending Gael Borjas DO

## 2021-08-10 NOTE — PROGRESS NOTES
Hospitalist Progress Note      SYNOPSIS: Patient admitted on 2021 for right ankle pain. SUBJECTIVE:  Patient seen and examined at bedside in NAD. Pt noted to have RUE swelling overnight then noted to have a suspected fall after being found sitting on floor. Pt not noted to have any new trauma or focal deficits. Pt currently AAO x 4, RUE without DVT on US. Vascular surgery was consulted, likely dependent edema, no intervention. INR also down-trending, H/H stable. Pt denies any chest pain or palpitations. Pt denies any fevers, chills, nausea, vomiting or diarrhea. Pt states pain is well controlled at current. Records reviewed. Noted to be hypoglycemic during dialysis. Hypoglycemic protocol initiated. Patient lethargic. Minimally responsive. Informed by RN that patient's right arm appears more swollen than it was yesterday. Also informed that patient had a fall yesterday. Temp (24hrs), Av.9 °F (37.2 °C), Min:98 °F (36.7 °C), Max:100.6 °F (38.1 °C)    DIET: ADULT DIET; Regular; 5 carb choices (75 gm/meal); No Added Salt (3-4 gm); Low Potassium (Less than 3000 mg/day); Low Phosphorus (Less than 1000 mg)  CODE: Full Code    Intake/Output Summary (Last 24 hours) at 8/10/2021 0908  Last data filed at 8/10/2021 0641  Gross per 24 hour   Intake 240 ml   Output    Net 240 ml       OBJECTIVE:    BP (!) 95/41   Pulse 74   Temp 98 °F (36.7 °C)   Resp 18   Ht 5' 6\" (1.676 m)   Wt 221 lb 1.9 oz (100.3 kg)   SpO2 95%   BMI 35.69 kg/m²     General appearance: Lethargic, groaning. HEENT: Normocephalic, atraumatic  Neck: Supple. No jugular venous distention. Respiratory: Clear to auscultation bilaterally, normal respiratory effort  Cardiovascular: Regular rate rhythm, normal S1-S2  Abdomen: Soft, nontender, nondistended  Musculoskeletal: No clubbing, cyanosis, no bilateral lower extremity edema. Brisk capillary refill. Right leg dressing C/D/I. RUE with dependent edema.  Right radial AVF with palpable thrill  Skin:  No rashes  on visible skin  Neurologic: Lethargic, nonverbal, responding to pain only not following commands. ASSESSMENT:  Principal Problem:    Ankle fracture, right, closed, initial encounter  Active Problems:    Essential hypertension    ESRD needing dialysis (HCC)    Elevated INR    CHF (congestive heart failure) (HCC)  Resolved Problems:    * No resolved hospital problems. *     PLAN:  -Altered mental status, hypoglycemia, hypotension. Get stat EKG, CT head, troponin, lactic acid. Get blood cultures x2. Recheck labs. - Orthopedic surgery c/s noted and appreciated; non-operative management of right foot. Follow-up as outpatient.  - PT/OT  - Pain control  - DVT PPX with SCD given elevated INR  - Suspect coagulopathy (elevated INR) is 2/2 congestive hepatopathy.  - No signs/symptoms of active bleeding. Cont to monitor H/H. Plt stable. - RUQ US with hepatomegaly, likely hepatic steatosis with component of cardiac hepatopathy. No hepatic mass or intrahepatic ductal dilatation. Patent portal vein. - Nephrology for maintenance HD (TTS)  - Cont home anti-HTN  - EP c/s noted and appreciated; pt for outpatient ICD placement  - Monitor vitals  - RUE with dependent edema, elevated limb.  US negative for RUE DVT  - Right radial AVF with palpable thrill  - Vascular surgery c/s noted and appreciated; no further intervention/work-up  - Ensure fall precautions    Diet: Renal Diet    Medications:  REVIEWED DAILY    Infusion Medications    dextrose 100 mL/hr (08/10/21 0905)    sodium chloride       Scheduled Medications    metoprolol tartrate  50 mg Oral BID    cinacalcet  30 mg Oral Daily    hydrALAZINE  75 mg Oral 3 times per day    isosorbide mononitrate  30 mg Oral Daily    sacubitril-valsartan  1 tablet Oral BID    sevelamer  1,600 mg Oral TID     sodium chloride flush  5-40 mL Intravenous 2 times per day     PRN Meds: dextrose, glucose, dextrose, glucagon (rDNA), dextrose,

## 2021-08-10 NOTE — PROCEDURES
PROCEDURE  8/10/21       Time: 1700    INTUBATION  Risks, benefits and alternatives if able (for applicable procedures below) described. Performed By: Cam Mccarthy MD.    Indication:  airway protection. Informed consent: Consent unable to be obtained due to the emergent nature of this procedure. .  Procedure: Following Preoxygenation the patient was pretreated with midazolam and etomidate followed by None. Intubation was performed after single attempt(s) by direct laryngoscopy using a laryngoscope and 8.0mm cuffed endotracheal tube was inserted . Initial post procedure placement:  confirmed by bilateral breath sounds, ETCO2 detection, and absence of sounds over stomach. Tube Secured @ 25cm at the Lip. Post procedure chest x-ray: showed appropriate tube position. Procedural Complications: None. Anesthesia Consult:  No.    I personally supervised the performance of the documented procedure. I was present for the critical elements of this procedure and was immediately available for the entire procedure.   Shaquille Thomas MD,[facility]  Pulmonary&Critical Care Medicine   Director of Lung Nodule Center  Medical Director of [address] [place] [address]    Maximo Gray

## 2021-08-10 NOTE — FLOWSHEET NOTE
08/10/21 1145   Vital Signs   BP (!) 97/53   Temp 98.1 °F (36.7 °C)   Pulse 98   Weight 224 lb 13.9 oz (102 kg)   Weight Method Bed scale   Percent Weight Change 1.69   Post-Hemodialysis Assessment   Post-Treatment Procedures Blood returned; Access bleeding time < 10 minutes   Machine Disinfection Process Exterior Machine Disinfection   Rinseback Volume (ml) 300 ml   Total Liters Processed (l/min) 78.9 l/min   Dialyzer Clearance Lightly streaked   Duration of Treatment (minutes) 240 minutes   Heparin amount administered during treatment (units) 0 units   Hemodialysis Intake (ml) 1300 ml   Hemodialysis Output (ml) 300 ml   NET Removed (ml) 1000 ml   Tolerated Treatment Good   Patient Response to Treatment tolerated fair, low bp low bs. started d5 @100, gave d50 multiple times, bolus with fluid, pt moans, slow to respond but can be aroused, blood returned, needles pulled, sites held, stasis achieved, bandaids applied, +thirll, +bruit

## 2021-08-10 NOTE — PROCEDURES
Procedure: right internal jugular vein Triple Lumen Catheter placement. Indications: vascular access    Consent: Unable to be obtained due to patient's condition. Number of sticks: 1    Number of Kits used: 1    Procedure: The patient was place in the trendelenburg position and the skin over the right internal jugular vein was prepped with betadine and draped in a sterile fashion. Local anesthesia was not performed due to the emergent nature of this procedure. With Ultrasound guidance a large bore needle was used to identify the vein, dark non pulsatile blood returned. The guide wire was then inserted through the needle with minimal resistance. 2 mm nick was made in the skin beside the guidewire. Then a dilator was inserted and removed. A triple lumen catheter was then inserted into the vessel over the guide wire using the Seldinger technique to the 15 cm cele. All ports showed good, free flowing blood return and were flushed with saline solution. The catheter was then securely fastened to the skin with sutures and covered with a bio patch and sterile dressing. A post procedure X-ray was ordered and is still pending at this time. Complications: None   The patient tolerated the procedure well. Estimated blood loss: 3 ml. Ursula Vanegas MD MD PGY-3  .  8/10/2021  4:57 PM  I personally supervised the performance of the documented procedure. I was present for the critical elements of this procedure and was immediately available for the entire procedure.   Shaquille Thomas MD,[facility]  Pulmonary&Critical Care Medicine   Director of Lung Nodule Center  Medical Director of [address] [place] [address]    Amarilis Mercado
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
## 2021-08-10 NOTE — PROGRESS NOTES
Pharmacy Consultation Note  (Antibiotic Dosing and Monitoring)    Initial consult date: 8-  Consulting physician: Dr. Daren Miller  Drug: Vancomycin  Indication: Sepsis of unknown origin    Age/  Gender Height Weight IBW Dosing weight  Allergy Information   56 y.o./male 5' 6\" (167.6 cm) 190 lb (86.2 kg)     Ideal body weight: 63.8 kg (140 lb 10.5 oz)  Adjusted ideal body weight: 79.1 kg (174 lb 5.4 oz)  102 kg  Vancomycin      Temp (24hrs), Av °F (37.2 °C), Min:98 °F (36.7 °C), Max:100.6 °F (38.1 °C)          Date  WBC BUN SCr CrCl  (mL/min) Drug/Dose Time   Given Level(s)   (Time) Comments   8-10 6.2 27 3.6  26   ##dialysis patient  Vancomycin  mg IV                                              Intake/Output Summary (Last 24 hours) at 8/10/2021 1803  Last data filed at 8/10/2021 1145  Gross per 24 hour   Intake 1300 ml   Output 300 ml   Net 1000 ml       Historical Cultures:  Organism   Date Value Ref Range Status   2020 Staphylococcus hominis ssp hominis (A)  Final   2020 Diphtheroids (A)  Final     No results for input(s): BC in the last 72 hours. Cultures:  available culture and sensitivity results were reviewed in [facility]    Assessment:  · 49 y.o. male has been initiated Vancomycin.   · Estimated CrCl = 26 mL/min ##dialysis patient  · Goal trough level = 15-20 mcg/mL    Plan:  · Will initiate vancomycin at a dose of 2000 mg once  · Monitor renal function   · Clinical pharmacy to follow      Manjula Ellis, [id number] [facility] 8/10/2021 6:03 PM

## 2021-08-10 NOTE — PROGRESS NOTES
Notified by Dialysis RN that patients blood sugar this AM was 41. Perfectserve sent to Dr. Holden Pleitez to notifiy him of situation, and to see if POCT glucose/hypoglycemic protocol can be ordered for patient.
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
## 2021-08-11 NOTE — PROCEDURES
Arterial line placement    Procedure: Left femoral arterial line placement. Indications: Continuous monitoring of blood pressure in a patient with hypotension +/- shock, on Levophed. Anesthesia: Local infiltration of 1% lidocaine. Consent:  Unable to be obtained due to the emergent nature of this procedure. Technique: Time Out: Immediately prior to the procedure a \"timeout\" was called to verify the correct patient and procedure. Procedure was done using strict aseptic technique. El's test was performed and was normal. left femoral site was cleaned with chloraprep and draped. Radial artery was identified, then Lidocaine 1% was infiltrated locally. Radial arterial line was inserted, a good blood flow was obtained, after which guidewire was inserted all the way with no resistance. Then the canula was inserted and needle with guidewire was withdrawn. Pulsatile bright red blood flow was observed. The canula was connected to BP monitoring apparatus and a good quality waveform was noted. Then the canula was secured with 2 stay sutures of 3-0 silk after Lidocaine infiltration, following which dressing was applied. Number of sticks: 1    Number of Kits used: 1. Complications: No immediate complication. Estimated blood loss: About 1 ml. I personally supervised the performance of the documented procedure. I was present for the critical elements of this procedure and was immediately available for the entire procedure. Comment: Patient tolerated the procedure well.      Shreyas Chatterjee MD PGY-2  8/10/2021 8:42 PM    Attending: Dr. Palak Martinez

## 2021-08-11 NOTE — PROGRESS NOTES
Physical Therapy    Pt on PT caseload for re-evaluation s/p change in status and transfer to intensive care. Pt not currently appropriate for PT services. Will continue to follow. Thank you.       Alice Simon, PT, DPT  [id number]

## 2021-08-11 NOTE — CONSULTS
Patient seen and examined. Chart, labs, imaging studies, EKG and rhythm strips reviewed. Full consult to follow. We were asked to see the patient for shock, Hx of HFrEF and known to Dr. Juma Melendez. IMPRESSION:  1. Septic shock   2. Lactic acidosis   3. Leukocytosis   4. Thrombocytopenia   5. Hypotension on multiple pressors. Hx of HTN   6. Unresponsive likely secondary to sepsis/ metabolic encephalopathy   7. Respiratory failure on mechanical ventilation   8. Coagulopathy   9. Hypoalbuminemia  10. Hyponatremia  11. Anemia of chronic disease   12. ESRD on chronic HD via right AV fistula   13. Right arm swelling   14. CMP, presumed nonischemic (negative stress test in 5/2020) with mod - severe LV systolic dysfunction on TTE 8/2020 with stage III DD  15. Mod-severe TR  16. Dilated hypercontractile right ventricle on TTE 8/2020   17. Moderate MR, Mod-severe TR and mild-mod Pulm HTN on TTE 8/2020. 18. T2DM  19. Hx of HLD   20. Hx of high grade Bladder CA, not resectable with mitomycin implant. 21. Hx of AAA s/p EVAR with endoleak (2020). 22. Right foot charcot joint and chronic pilon fracture   23. Hx of Tobacco and cocaine abuse   24. DNR-CCA    PLAN:   1. Continue supportive measures as per ICU team  2. Nothing to offer from cardiology standpoint  3. Cardiology will sign off; please call if needed.      Electronically signed by James Aguirre MD on 8/11/2021 at 10:17 AM

## 2021-08-11 NOTE — PROGRESS NOTES
Per Dr. Miguel Angel Haskins no need for CT of right Humerous at this time. Will follow up in the future if needed. Contact vascular resident when blood products are done infusing. Ct scan and respiratory therapy aware.

## 2021-08-11 NOTE — PROGRESS NOTES
Occupational Therapy      OT consult received and appreciated. Chart reviewed. Discussed pt case with RN. Will hold evaluation due to pt's fragile respiratory status. Will evaluate at a later time. Thank you.  Jeny Mena, OTR/L #[id number]

## 2021-08-11 NOTE — PROGRESS NOTES
Per Ekta Boles from case management, no family able to sign consent at this time. Would have to be a two physician signed consent. Dolores Valdes, friend of the paitent was contacted explained continuous renal replacement therapy and gave telephone consent. Awaiting response from Ekta Boles regarding treatment consent.

## 2021-08-11 NOTE — PROGRESS NOTES
Pharmacy Consultation Note  (Antibiotic Dosing and Monitoring)    Initial consult date: 8-  Consulting physician: Dr. Rogers Armijo  Drug: Vancomycin  Indication: Septic Shock    Age/  Gender Height Weight IBW Dosing weight  Allergy Information   56 y.o./male 5' 6\" (167.6 cm) 190 lb (86.2 kg)     Ideal body weight: 63.8 kg (140 lb 10.5 oz)  Adjusted ideal body weight: 79.2 kg (174 lb 10.1 oz)  102 kg  Vancomycin      Temp (24hrs), Av.1 °F (37.8 °C), Min:97.9 °F (36.6 °C), Max:101.7 °F (38.7 °C)          Date  HD Drug/Dose Time   Given Level(s)   (Time) Comments   8/10 HD Vancomycin  2000 mg IV x1 2107      CVVHD Hemodialysis 1500 mg IV q24h                            Intake/Output Summary (Last 24 hours) at 2021 1217  Last data filed at 2021 1200  Gross per 24 hour   Intake 2136 ml   Output 650 ml   Net 1486 ml       Historical Cultures:  Organism   Date Value Ref Range Status   2020 Staphylococcus hominis ssp hominis (A)  Final   2020 Diphtheroids (A)  Final     Recent Labs     08/10/21  1405   BC Gram stain performed from blood culture bottle media  Gram positive cocci in clusters  *       Cultures:  available culture and sensitivity results were reviewed in [facility]    Assessment:  · 49 y.o. male currently intubated/sedated initiated on empiric antibiotics for septic shock 2/2 staph bacteremia. ID consulted and pharmacy asked to dose/monitor vancomycin.    · ESRD TTS --> CVVHD as of   · Goal trough level = 15-20 mcg/mL    Plan:  · Vancomycin 1500 mg IV q24h  · Monitor renal function   · Clinical pharmacy to follow    Fawad Barraza PharmD, BCPS 2021 12:19 PM

## 2021-08-11 NOTE — PROGRESS NOTES
[address] [facility]   Department of Internal Medicine   Internal Medicine Residency  MICU Progress Note    Patient:  Camacho Vega 49 y.o. male   MRN: [id number]       Date of Service: 2021    Allergy: Vancomycin  Follow up septic shock   Subjective     Patient was seen and examined this morning at bedside. Overnight, patient has been on 3 pressors, intubated and connected to ventilator. Blood pressure was stable overnight with 3 pressors. Objective     TEMPERATURE:  Current - Temp: 99.3 °F (37.4 °C); Max - Temp  Av.2 °F (37.9 °C)  Min: 97.9 °F (36.6 °C)  Max: 101.7 °F (38.7 °C)  RESPIRATIONS RANGE: Resp  Av.2  Min: 18  Max: 26  PULSE RANGE: Pulse  Av  Min: 97  Max: 122  BLOOD PRESSURE RANGE:  Systolic (09MAZ), IZR:687 , Min:86 , KEB:573   ; Diastolic (99HDN), RGK:87, Min:35, Max:66    PULSE OXIMETRY RANGE: SpO2  Av.5 %  Min: 91 %  Max: 100 %    I & O - 24hr:    Intake/Output Summary (Last 24 hours) at 2021 1350  Last data filed at 2021 1200  Gross per 24 hour   Intake 2136 ml   Output 650 ml   Net 1486 ml     I/O last 3 completed shifts: In:  [I.V.:]  Out: 300  I/O this shift:  In: 60 [I.V.:60]  Out: 650 [Emesis/NG output:650]   Weight change: -15.1 oz (-0.427 kg)    Physical Exam:  General Appearance:   Sedated and intubated   HEENT:    NC/AT, mucous membranes are moist   Neck:   Supple, no jugular venous distention. Resp:     CTAB, No wheezes, No rhonchi, on ventilator   Heart:    RRR, S1 and S2 normal, no murmur, rub or gallop. Abdomen:     Soft, non-tender, non-distended with normal bowel sounds   Extremities:  2+ edema in all extremities   Pulses:  Radial and pedal pulses are intact bilaterally   Neurologic:  Sedated.         Medications     Continuous Infusions:   vasopressin (Septic Shock) infusion 0.03 Units/min (21 1152)    fentaNYL 5 mcg/ml in 0.9%  ml infusion 100 mcg/hr (21 1016)    midazolam 5 mg/hr (21 1016)    dextrose      norepinephrine 45 mcg/min (08/11/21 1013)    EPINEPHrine infusion Stopped (08/11/21 1251)    phenylephrine (FRANKLYN-SYNEPHRINE) 50mg/250mL infusion 175 mcg/min (08/11/21 1302)    sodium chloride       Scheduled Meds:   sennosides-docusate sodium  2 tablet Oral Daily    chlorhexidine  15 mL Mouth/Throat BID    pantoprazole  40 mg Intravenous BID    And    sodium chloride (PF)  10 mL Intravenous BID    nafcillin  2,000 mg Intravenous Q4H    albumin human  25 g Intravenous Q8H    vancomycin (VANCOCIN) intermittent dosing (placeholder)   Other RX Placeholder    hydrocortisone sodium succinate PF  100 mg Intravenous Q8H    ascorbic acid  1,500 mg Intravenous Q6H    thiamine (VITAMIN B1) IVPB  200 mg Intravenous Q12H    [Held by provider] metoprolol tartrate  50 mg Oral BID    cinacalcet  30 mg Oral Daily    [Held by provider] hydrALAZINE  75 mg Oral 3 times per day    [Held by provider] isosorbide mononitrate  30 mg Oral Daily    [Held by provider] sacubitril-valsartan  1 tablet Oral BID    [Held by provider] sevelamer  1,600 mg Oral TID WC    sodium chloride flush  5-40 mL Intravenous 2 times per day     PRN Meds: dextrose, glucose, dextrose, glucagon (rDNA), dextrose, perflutren lipid microspheres, sodium chloride flush, sodium chloride, polyethylene glycol, acetaminophen **OR** acetaminophen, [Held by provider] HYDROcodone 5 mg - acetaminophen, melatonin      Labs and Imaging Studies     CBC:   Recent Labs     08/10/21  1402 08/10/21  1740 08/11/21  0427   WBC 6.2 9.4 21.9*   HGB 10.8* 10.1* 10.2*   HCT 32.7* 30.8* 31.0*   MCV 84.9 85.6 85.9   PLT 36* 36* 36*       BMP:    Recent Labs     08/10/21  1740 08/11/21  0040 08/11/21  0427    131* 133   K 4.3 4.2 4.5   CL 96* 91* 91*   CO2 25 23 21*   BUN 30* 33* 35*   CREATININE 3.8* 3.8* 3.9*   GLUCOSE 61* 119* 113*       LIVER PROFILE:   Recent Labs     08/10/21  0730 08/10/21  1402 08/10/21  1740 08/11/21  0427   AST 22 26  --  43* ALT 7 7  --  10   LIPASE  --   --  6*  --    BILIDIR  --  3.4*  --   --    BILITOT 3.6* 4.0*  --  5.6*   ALKPHOS 109 60  --  68       PT/INR:   Recent Labs     08/09/21  0454 08/10/21  0730 08/11/21 0427   PROTIME 22.8* 24.9* 30.4*   INR 2.1 2.3 2.7       APTT:   Recent Labs     08/09/21  0454 08/10/21  0730 08/11/21 0427   APTT 34.0 35.6* 38.9*       Fasting Lipid Panel:    Lab Results   Component Value Date    CHOL 95 01/29/2021    TRIG 51 01/29/2021    HDL 54 01/29/2021       Cardiac Enzymes:    Lab Results   Component Value Date    CKTOTAL 185 08/10/2021    CKTOTAL 198 11/15/2015    CKTOTAL 218 (H) 11/15/2015    CKMB 3.5 08/10/2021    CKMB 3.4 11/15/2015    CKMB 3.5 11/15/2015    TROPONINI 0.11 (H) 01/29/2021    TROPONINI 0.11 (H) 01/28/2021    TROPONINI 0.11 (H) 01/28/2021       Notable Cultures:      Blood cultures   Blood Culture, Routine   Date Value Ref Range Status   08/10/2021 (A)  Preliminary    Gram stain performed from blood culture bottle media  Gram positive cocci in clusters       Respiratory cultures No results found for: RESPCULTURE   Gram Stain Result   Date Value Ref Range Status   11/25/2016   Final    Gram stain performed from swab, interpret results with  caution. Swab specimens of sterile fluids are inferior to  aspirate specimens for organism recovery. Abundant Polymorphonuclear leukocytes  Epithelial cells not seen  Rare Gram positive cocci       Urine   Urine Culture, Routine   Date Value Ref Range Status   02/08/2019 Growth not present  Final     Legionella No results found for: LABLEGI  C Diff PCR No results found for: CDIFPCR  Wound culture/abscess: No results for input(s): WNDABS in the last 72 hours. Tip culture:No results for input(s): CXCATHTIP in the last 72 hours.       Oxygen:     Vent Information  $Ventilation: $Initial Day  Skin Assessment: Clean, dry, & intact  Vent Type: 980  Vent Mode: AC/VC  Vt Ordered: 450 mL  Rate Set: 16 bmp  Peak Flow: 60 L/min  Pressure Support: 0 cmH20  FiO2 : 50 %  SpO2: 91 %  SpO2/FiO2 ratio: 182  Sensitivity: 3  PEEP/CPAP: 5  I Time/ I Time %: 0 s  Humidification Source: Heated wire  Humidification Temp: 37  Humidification Temp Measured: 37  Circuit Condensation: Drained  Additional Respiratory  Assessments  Pulse: 109  Resp: 19  SpO2: 91 %  Humidification Source: Heated wire  Humidification Temp: 37  Circuit Condensation: Drained  Oral Care: Mouthwash, Mouthwash with chlorhexidine, Lip moisturizer applied, Mouth swabbed, Mouth moisturizer, Mouth suctioned  Airway Type: ET  Airway Size: 8            Imaging Studies:  US GALLBLADDER RUQ   Final Result   Contracted gallbladder limits the examination. Thickened wall with   suggestion of trace pericholecystic fluid may reflect cholecystitis. Differential includes artifact from gallbladder contracted state since Heri Candy   sign is reportedly negative. XR ABDOMEN FOR NG/OG/NE TUBE PLACEMENT   Final Result   Enteric tube tip in the body of the stomach. XR CHEST PORTABLE   Final Result   Right internal jugular line tip in the proximal SVC. No pneumothorax. Stable position of ET tube. Cardiomegaly. XR CHEST PORTABLE   Final Result   Mild CHF with marginal improvement. Superimposed pneumonia is less likely. CT HEAD WO CONTRAST   Final Result   No acute intracranial abnormality. MRI may be obtained if clinically   indicated. XR CHEST PORTABLE   Final Result   Mild bibasilar opacities. Tubes and catheters as noted above. US DUP UPPER EXTREMITY RIGHT VENOUS   Final Result   No evidence of DVT of the right upper extremity. US ABDOMEN COMPLETE   Final Result   1. Hepatomegaly and diffusely increased echogenicity. No hepatic mass or   intrahepatic ductal dilatation. Patent portal vein. 2.  Abdominal aortic aneurysm status post stent placement. 3.  Bilateral renal cortical thinning. No hydronephrosis.       4.  Mild gallbladder wall pressure  · Monitor BMP  · Monitor CBC  · Trend lactic acid  · Volume resuscitation  · Patient is on pressors  · Continue albumin  · Discontinue Vanco   · Start nafcillin 2000 mg every 4 hours  · Access right leg after splint removal as a possible source of infection  · Vascular surgery was consulted and they ruled out fistula as the source of infection    CV  Acute metabolic shock and possibly due to cardiogenic shock versus septic shock,   · Volume resuscitation  · Continue albumin  · Continue pressors  · Follow-up SARA      History of CHF secondary to nonischemic cardiomyopathy  · Echocardiogram 8/8/2021: mild concentric left ventricular hypertrophy, EF 35%. , stage III diastolic dysfunction, moderately enlarged right ventricle cavity. History of A. Fib, stable  · Sinus rhythm     History of aortic aneurysm repair, stable    History of hypertension  · Home meds are on hold    Pulmonary  Acute respiratory failure secondary to septic shock versus cardiogenic shock  · Patient is sedated and intubated  · See above for vent setting  ·     Renal  ESRD on hemodialysis  · Patient is on hemodialysis  · Vascular access: Fistula right arm    Lactic acidosis secondary to hypoperfusion  · Trend lactic acid, 8.3 today        Hematology/oncology  Leukocytosis possibly secondary to infection  · Trend WBC  · WBC 21.9 today    Thrombocytopenia due to??  · Trend platelet  · Platelet 40,878    Normocytic normochromic anemia possibly secondary to chronic disease versus iron deficiency anemia  · Hemoglobin 10.2, MCV 85  · Hemoglobin is stable at baseline    History of bladder cancer    Musculoskeletal    Right ankle sprain/strain   · Splint can be removed             # Peptic ulcer prophylaxis: Protonix   # DVT Prophylaxis: None (thrombocytopenic)  # Disposition: Cont current care     Gilbert Mcadams MD, PGY-1  Attending Physician: Dr. Luly Reynoso I personally saw, examined and provided care for the patient.  Radiographs, labs and medication list were reviewed by me independently. I spoke with bedside nursing, therapists and consultants. Critical care services and times documented are independent of procedures and multidisciplinary rounds with Residents. Additionally comprehensive, multidisciplinary rounds were conducted with the MICU team. The case was discussed in detail and plans for care were established. Review of Residents documentation was conducted and revisions were made as appropriate. I agree with the above documented exam, problem list and plan of care. Septic and cardiogenic shock   Still on  Pressors   Fully resuscitated   IVC dilated,NICOM negative  CRRT  CT/US arm ,renal ok   Vascular to assess  GPC 4 bottles ,source arm,ID on Board  BS better  On Vanc/cefazoline   May need SARA    Care reviewed with nursing staff, medical and surgical specialty care, primary care and the patient's family as available. Chart review/lab review/X-ray viewing/documentation and had long Conversation with patient/family re: prognosis, care options and any end of life issues:      Critical care time spent reviewing labs/films, examining patient, collaborating with other physicians more than 35  Minutes  excluding procedures . Elizabeth Garcia M.D.   8/11/2021  9:57 PM

## 2021-08-11 NOTE — PROGRESS NOTES
Stage  CI HR MAP TPRI SVI   Baseline 3.2 [phone] 29   Challenge 3.2 110   30   Result (%Ä) 2.9 -0.3   3.2     Three pressor medications infusing,  plr challenge.

## 2021-08-11 NOTE — PROGRESS NOTES
Per Ortho Dr. Earl Hensley, right ankle cast maybe removed for inspection. Contact Dr. Earl Hensley to re-cast if removed.
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
## 2021-08-11 NOTE — PLAN OF CARE
Problem: Skin Integrity:  Goal: Absence of new skin breakdown  Description: Absence of new skin breakdown  Outcome: Met This Shift     Problem: Falls - Risk of:  Goal: Will remain free from falls  Description: Will remain free from falls  8/11/2021 0731 by Shira Vincent RN  Outcome: Met This Shift     Problem: Falls - Risk of:  Goal: Absence of physical injury  Description: Absence of physical injury  8/11/2021 0731 by Shira Vincent RN  Outcome: Met This Shift
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
## 2021-08-11 NOTE — PROGRESS NOTES
Hospitalist Progress Note      SYNOPSIS: Patient admitted on 2021 for right ankle pain. SUBJECTIVE:  Patient seen and examined at bedside in NAD. Pt noted to have RUE swelling overnight then noted to have a suspected fall after being found sitting on floor. Pt not noted to have any new trauma or focal deficits. Pt currently AAO x 4, RUE without DVT on US. Vascular surgery was consulted, likely dependent edema, no intervention. INR also down-trending, H/H stable. Rapid response called yesterday after patient became more altered. Hypotensive. Hypoglycemic. Initial lactic acid was 5.9. This is continue to trend upwards with most recent lactic acid of 9.4. Procalcitonin 29. WBC is increased to 21.9. Patient was intubated for airway protection. Started on empiric antibiotic coverage. Temp (24hrs), Av °F (37.8 °C), Min:97.9 °F (36.6 °C), Max:101.7 °F (38.7 °C)    DIET: No diet orders on file  CODE: [id number]    Intake/Output Summary (Last 24 hours) at 2021 0826  Last data filed at 2021 0513  Gross per 24 hour   Intake 3376 ml   Output 300 ml   Net 3076 ml       OBJECTIVE:    BP (!) 86/57   Pulse 109   Temp 97.9 °F (36.6 °C) (Esophageal)   Resp 20   Ht 5' 6\" (1.676 m)   Wt 225 lb 9.6 oz (102.3 kg)   SpO2 96%   BMI 36.41 kg/m²     General appearance: Intubated and sedated  HEENT: Normocephalic, atraumatic. Neck: Supple. No jugular venous distention. Respiratory: Clear to auscultation bilaterally, normal respiratory effort  Cardiovascular: Tachycardic  Abdomen: Soft, nontender, nondistended  Musculoskeletal: No clubbing, cyanosis, no bilateral lower extremity edema. Brisk capillary refill. Right leg dressing C/D/I. RUE with dependent edema.  Right radial AVF with palpable thrill  Skin:  No rashes  on visible skin  Neurologic: Intubated and sedated    ASSESSMENT:  Septic shock, unclear etiology  Acute encephalopathy, unclear etiology  Hypoglycemia  Thrombocytopenia  Leukocytosis  Lactic acidosis  Hyperbilirubinemia  ESRD on hemodialysis  HFrEF, EF 35%  Right ankle fracture         PLAN:  Critical care following  Nephrology following  Cardiology consulted  Infectious disease consulted  Mechanical ventilation, wean as tolerated  Pressure support, Levophed wean as tolerated  Trend lactic acid  Continue vancomycin and Zosyn  Check cultures  Hemodialysis per nephrology guidance  Right upper quadrant ultrasound pending      Diet: Renal Diet    Medications:  REVIEWED DAILY    Infusion Medications    fentaNYL 5 mcg/ml in 0.9%  ml infusion 75 mcg/hr (08/11/21 5708)    midazolam 5 mg/hr (08/11/21 0540)    dextrose      norepinephrine 50 mcg/min (08/11/21 0507)    EPINEPHrine infusion 10 mcg/min (08/11/21 0306)    phenylephrine (FRANKLYN-SYNEPHRINE) 50mg/250mL infusion 55 mcg/min (08/11/21 0545)    sodium chloride       Scheduled Medications    piperacillin-tazobactam  3,375 mg Intravenous Q8H    albumin human  25 g Intravenous Q8H    vancomycin (VANCOCIN) intermittent dosing (placeholder)   Other RX Placeholder    hydrocortisone sodium succinate PF  100 mg Intravenous Q8H    ascorbic acid  1,500 mg Intravenous Q6H    thiamine (VITAMIN B1) IVPB  200 mg Intravenous Q12H    [Held by provider] metoprolol tartrate  50 mg Oral BID    cinacalcet  30 mg Oral Daily    [Held by provider] hydrALAZINE  75 mg Oral 3 times per day    [Held by provider] isosorbide mononitrate  30 mg Oral Daily    [Held by provider] sacubitril-valsartan  1 tablet Oral BID    [Held by provider] sevelamer  1,600 mg Oral TID     sodium chloride flush  5-40 mL Intravenous 2 times per day     PRN Meds: dextrose, glucose, dextrose, glucagon (rDNA), dextrose, perflutren lipid microspheres, sodium chloride flush, sodium chloride, ondansetron **OR** ondansetron, polyethylene glycol, acetaminophen **OR** acetaminophen, [Held by provider] HYDROcodone 5 mg - acetaminophen, melatonin    Labs:     Recent Labs     08/10/21  1402 08/10/21  1740 08/11/21  0427   WBC 6.2 9.4 21.9*   HGB 10.8* 10.1* 10.2*   HCT 32.7* 30.8* 31.0*   PLT 36* 36* 36*       Recent Labs     08/10/21  0730 08/10/21  1402 08/10/21  1740 08/11/21  0040 08/11/21  0427      < > 135 131* 133   K 5.0   < > 4.3 4.2 4.5   CL 91*   < > 96* 91* 91*   CO2 28   < > 25 23 21*   BUN 62*   < > 30* 33* 35*   CREATININE 6.4*   < > 3.8* 3.8* 3.9*   CALCIUM 8.5*   < > 8.0* 8.2* 8.4*   PHOS 2.3*  --   --   --  2.9    < > = values in this interval not displayed. Recent Labs     08/10/21  0730 08/10/21  1402 08/10/21  1740 08/11/21 0427   PROT 5.3* 5.3*  --  5.1*   ALKPHOS 109 60  --  68   ALT 7 7  --  10   AST 22 26  --  43*   BILITOT 3.6* 4.0*  --  5.6*   LIPASE  --   --  6*  --        Recent Labs     08/09/21  0454 08/10/21  0730 08/11/21 0427   INR 2.1 2.3 2.7       Recent Labs     08/10/21  1740   CKTOTAL 185       Chronic labs:    Lab Results   Component Value Date    CHOL 95 01/29/2021    TRIG 51 01/29/2021    HDL 54 01/29/2021    LDLCALC 31 01/29/2021    TSH 4.660 (H) 01/28/2021    INR 2.7 08/11/2021    LABA1C 5.8 (H) 01/29/2021       Radiology: REVIEWED DAILY    +++++++++++++++++++++++++++++++++++++++++++++++++  DO Ju Castillo Physician - [address] [place], [place]  +++++++++++++++++++++++++++++++++++++++++++++++++  NOTE: This report was transcribed using voice recognition software. Every effort was made to ensure accuracy; however, inadvertent computerized transcription errors may be present.
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
## 2021-08-11 NOTE — PLAN OF CARE
Problem: Skin Integrity:  Goal: Will show no infection signs and symptoms  Description: Will show no infection signs and symptoms  Outcome: Not Met This Shift     Problem: Falls - Risk of:  Goal: Will remain free from falls  Description: Will remain free from falls  Outcome: Met This Shift     Problem: Falls - Risk of:  Goal: Absence of physical injury  Description: Absence of physical injury  Outcome: Met This Shift
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
## 2021-08-11 NOTE — CARE COORDINATION
Pt transferred to MICU following RRT 8/10 for hypotension, hypoglycemia and AMS. Pt sedated and intubated. On pressors. ID consulted. Currently on Nafcillin. For SARA, CT right humerous. Referral was made to [facility] was not discussed with pt prior to RRT. Will discuss with pt once pt is extubated as he has no decision maker listed.

## 2021-08-11 NOTE — PROGRESS NOTES
Patient is full code at this time, two physician consent needed for treatments due to no medical power of .
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
## 2021-08-12 NOTE — PROGRESS NOTES
Spoke with Julieta from lab about unresulted ionized calcium collected and sent around 0030. Lab stating that sample was no received/cannot be found and to redraw and send.  Sample resent along with magnesium and phosphorus labs at 0150

## 2021-08-12 NOTE — PROGRESS NOTES
(Septic Shock) infusion 0.03 Units/min (08/12/21 0606)    anticoagulant sodium citrate 20 g (08/12/21 0330)    And    calcium chloride CRRT infusion 70 mL/hr at 08/12/21 0640    sodium chloride      sodium chloride      sodium chloride      fentaNYL 5 mcg/ml in 0.9%  ml infusion Stopped (08/12/21 0620)    midazolam Stopped (08/12/21 0225)    dextrose      norepinephrine 75 mcg/min (08/12/21 0607)    EPINEPHrine infusion 4 mcg/min (08/12/21 0555)    phenylephrine (FRANKLYN-SYNEPHRINE) 50mg/250mL infusion 300 mcg/min (08/12/21 0606)    sodium chloride       Scheduled Meds:   sennosides-docusate sodium  2 tablet Oral Daily    chlorhexidine  15 mL Mouth/Throat BID    pantoprazole  40 mg Intravenous BID    And    sodium chloride (PF)  10 mL Intravenous BID    nafcillin  2,000 mg Intravenous Q4H    albumin human  25 g Intravenous Q8H    hydrocortisone sodium succinate PF  100 mg Intravenous Q8H    ascorbic acid  1,500 mg Intravenous Q6H    thiamine (VITAMIN B1) IVPB  200 mg Intravenous Q12H    [Held by provider] metoprolol tartrate  50 mg Oral BID    cinacalcet  30 mg Oral Daily    [Held by provider] hydrALAZINE  75 mg Oral 3 times per day    [Held by provider] isosorbide mononitrate  30 mg Oral Daily    [Held by provider] sacubitril-valsartan  1 tablet Oral BID    [Held by provider] sevelamer  1,600 mg Oral TID WC    sodium chloride flush  5-40 mL Intravenous 2 times per day     PRN Meds: potassium chloride, magnesium sulfate, calcium gluconate **OR** calcium gluconate **OR** calcium gluconate **OR** calcium gluconate, sodium phosphate IVPB **OR** sodium phosphate IVPB **OR** sodium phosphate IVPB **OR** sodium phosphate IVPB, sodium chloride, sodium chloride, sodium chloride, dextrose, glucose, dextrose, glucagon (rDNA), dextrose, perflutren lipid microspheres, sodium chloride flush, sodium chloride, polyethylene glycol, acetaminophen **OR** acetaminophen, [Held by provider] HYDROcodone 5 mg fluids are inferior to  aspirate specimens for organism recovery. Abundant Polymorphonuclear leukocytes  Epithelial cells not seen  Rare Gram positive cocci       Urine   Urine Culture, Routine   Date Value Ref Range Status   02/08/2019 Growth not present  Final     Legionella No results found for: LABLEGI  C Diff PCR No results found for: CDIFPCR  Wound culture/abscess: No results for input(s): WNDABS in the last 72 hours. Tip culture:No results for input(s): CXCATHTIP in the last 72 hours. Oxygen:     Vent Information  $Ventilation: $Subsequent Day  Skin Assessment: Clean, dry, & intact  Vent Type: 980  Vent Mode: AC/VC  Vt Ordered: 450 mL  Rate Set: 20 bmp  Peak Flow: 60 L/min  Pressure Support: 0 cmH20  FiO2 : 70 %  SpO2: 96 %  SpO2/FiO2 ratio: 137.14  Sensitivity: 0  PEEP/CPAP: 5  I Time/ I Time %: 0 s  Humidification Source: Heated wire  Humidification Temp: 37  Humidification Temp Measured: 37  Circuit Condensation: Drained  Additional Respiratory  Assessments  Pulse: 94  Resp: 20  SpO2: 96 %  Humidification Source: Heated wire  Humidification Temp: 37  Circuit Condensation: Drained  Oral Care: Mouth swabbed, Mouth suctioned  Subglottic Suction Done?: Yes  Airway Type: ET  Airway Size: 8            Imaging Studies:  XR CHEST PORTABLE   Final Result   Devices as described. Small right pleural effusion with minimal right basilar subsegmental   atelectasis. US GALLBLADDER RUQ   Final Result   Contracted gallbladder limits the examination. Thickened wall with   suggestion of trace pericholecystic fluid may reflect cholecystitis. Differential includes artifact from gallbladder contracted state since Willow Rimes   sign is reportedly negative. XR ABDOMEN FOR NG/OG/NE TUBE PLACEMENT   Final Result   Enteric tube tip in the body of the stomach. XR CHEST PORTABLE   Final Result   Right internal jugular line tip in the proximal SVC. No pneumothorax. Stable position of ET tube. and Plan:    Neurologic    Acute metabolic encephalopathy secondary to septic shock versus cardiogenic shock   -Patient intubated sedated   -Trend BMP, CBC   -Lactic acid 20.9 today -continue to trend   -Continue nafcillin   -Patient was ruled out a source of infection   -Continue albumin   -Patient was given methylene blue able to wean off 3 pressors temporarily however patient's pressure started to drop again after an hour and a half and pressors were reinitiated   -Continue pressors as needed    Cardiovascular    Acute metabolic shock likely secondary to cardiogenic shock vs septic shock   -Continue albumin   -Methylene blue was given --patient able to wean off 3 pressors temporarily however patient's pressure started to drop after an hour and a half and pressors were not reinitiated   -SARA was canceled   -Continue nafcillin   -Lactic acid 20.9 continue to trend    CHF secondary to nonischemic cardiomyopathy   -Echo August 2021 shows mild left ventricular hypertrophy with an ejection fraction 35% and moderately large right ventricle    History of A.  Fib --stable    History of AAA repair --stable    History hypertension   -Currently on hold    Pulmonary    Acute respiratory failure secondary secondary adverse cardiogenic shock   -Patient is sedated and intubated currently on pressors    /renal    ESRD on hemodialysis   -Temp line placed yesterday per vascular   -Patient receiving CVVHD   -Creatinine 3 3    Hematology/oncology    Leukocytosis likely secondary to infection   -WBC 35.5 today up from 21.9 yesterday   -Continue to trend    Thrombocytopenia   -Platelets 86,051 today down from 36,000 yesterday   -Continue to trend    Normocytic anemia likely secondary to anemia of chronic disease versus iron deficiency anemia   -Hemoglobin 8.0 today down from 10.2 yesterday    Supratherapeutic INR   -INR today 3.5   -Vitamin K given    History of bladder cancer    # Peptic ulcer prophylaxis: Protonix  # DVT Prophylaxis: None  # Disposition: Cont current care    Clive Thornton DO, PhD  PGY-1    Attending Physician: Dr. Lesly Forte personally saw, examined and provided care for the patient. Radiographs, labs and medication list were reviewed by me independently. I spoke with bedside nursing, therapists and consultants. Critical care services and times documented are independent of procedures and multidisciplinary rounds with Residents. Additionally comprehensive, multidisciplinary rounds were conducted with the MICU team. The case was discussed in detail and plans for care were established. Review of Residents documentation was conducted and revisions were made as appropriate. I agree with the above documented exam, problem list and plan of care. Septic shock with multiorgan failure with sever acidosis with need for 4 pressors . At this point I feel his prognosis is poor and if he arrest ,any further CPR or aggressive therapy will be futile   Will continue current therapy and CRRT   IV abx a per ID    Care reviewed with nursing staff, medical and surgical specialty care, primary care and the patient's family as available. Chart review/lab review/X-ray viewing/documentation and had long Conversation with patient/family re: prognosis, care options and any end of life issues:      Critical care time spent reviewing labs/films, examining patient, collaborating with other physicians more than 35  Minutes  excluding procedures . [place] Dany Munguia M.D.   8/12/2021  11:55 PM

## 2021-08-12 NOTE — PLAN OF CARE
Problem: Skin Integrity:  Goal: Absence of new skin breakdown  Description: Absence of new skin breakdown  8/12/2021 0909 by Leonard Colón RN  Outcome: Met This Shift     Problem: Falls - Risk of:  Goal: Will remain free from falls  Description: Will remain free from falls  8/12/2021 0909 by Leonard Colón RN  Outcome: Met This Shift     Problem: Falls - Risk of:  Goal: Absence of physical injury  Description: Absence of physical injury  8/12/2021 0909 by Leonard Colón RN  Outcome: Met This Shift     Problem: Skin Integrity:  Goal: Will show no infection signs and symptoms  Description: Will show no infection signs and symptoms  8/12/2021 0909 by Leonard Colón RN  Outcome: Ongoing     Problem: Serum Glucose Level - Abnormal:  Goal: Ability to maintain appropriate glucose levels will improve  Description: Ability to maintain appropriate glucose levels will improve  Outcome: Ongoing     Problem: Tissue Perfusion, Altered:  Goal: Circulatory function within specified parameters  Description: Circulatory function within specified parameters  Outcome: Ongoing     Problem: Infection, Septic Shock:  Goal: Will show no infection signs and symptoms  Description: Will show no infection signs and symptoms  Outcome: Not Met This Shift

## 2021-08-12 NOTE — PROGRESS NOTES
[facility] PROGRESS NOTE      Chief complaint: Follow-up of MSSA bacteremia     The patient is a 49 y.o. male with history of atrial fibrillation, ESRD on hemodialysis, DM, hypertension, infrarenal abdominal aortic aneurysm status post endovascular repair with graft in place in 71/4190 complicated by left groin seroma s/p incision and drainage in 12/2020, cystoscopy with fulguration and instillation of intravesical mitomycin-C in 06/2020, presented on 08/07 for right shoulder and ankle pain for 3 days after a fall found to have chronic right pilon fracture with nonunion and Charcot joint of the right ankle managed nonoperatively per Orthopedic Surgery. He was also noted to have right arm swelling thought to be dependent edema per Vascular Surgery with ultrasound showing no DVT and AV graft functioning well. He developed fever of 100.6 °F on 08/09 worsening up to 101.7 °F on 08/11 accompanied by lethargy, hypoglycemic episodes, hypotension, leukocytosis of 21,000, elevated procalcitonin of 29 ng/mL, prompting transfer on 08/10 to ICU where he was intubated for airway protection and vasopressors, hydrocortisone, empiric antibiotics (piperacillin-tazobactam and vancomycin) were started. Chest x-ray showed mild bibasilar opacities. Respiratory pathogen PCR panel was negative. Sputum Gram stain and culture showed < 25 PMNs/LPF, < 25 epithelial cells/LPF, no organisms. Blood cultures showed gram-positive cocci in clusters (MSSA by PCR). Piperacillin-tazobactam was switched to nafcillin. Subjective: Patient was seen and examined. He remains in critical condition, intubated and sedated, on multiple vasopressors.     Objective:  BP (!) 103/50   Pulse 97   Temp 96.4 °F (35.8 °C) (Esophageal)   Resp 25   Ht 5' 6\" (1.676 m)   Wt 226 lb (102.5 kg)   SpO2 96%   BMI 36.48 kg/m²   Constitutional: Intubated, no MV dyssynchrony  Respiratory: Clear breath sounds, no crackles, no wheezes  Cardiovascular: Regular rate and

## 2021-08-12 NOTE — CONSULTS
Palliative Care Department  [phone]  Palliative Care Initial Consult  Provider Iveth Valente, ENMANUEL - CNP      PATIENT: Fabio Miller  : 1965  MRN: [id number]  ADMISSION DATE: 2021 12:59 AM  Referring Provider: Dr. Hola Mcintyre was consulted on hospital day 5 for assistance with Goals of care, Code Status Discussion  HPI:     Clinical Summary:Tobias Robles is a 49 y.o. y/o male with a history of end stage renal disease, diabetes, endovascular repair for aortic aneurysm, hypertension, HFrEF-nonischemic EF 35%, and bladder cancer who presented to [facility]) on 2021 with shoulder and ankle pain. X ray of the right ankle found a fracture of the medial malleolus. Ortho was consulted and elected to treat non-surgically. On  he had a fall and had increased swelling of his right arm. An RRT was called on 8/10 for altered mental status. He was hypoglycemic, lethargic, tachycardic, and hypotensive. Elevated lactic acid. Patient was intubated for airway protection. He was started on 4 vasopressors. Blood cultures were positive for gram-positive cocci (MSSA by PCR) bacteremia, etiology unclear, suspect AV graft infection vs septic arthritis. CVVHD was started on . Lactic acid trending up, last lactic 20.9. ASSESSMENT/PLAN:     Pertinent Hospital Diagnoses      Septic Shock   Closed fracture of the right ankle   End stage renal disease     HFrEF-nonischemic EF 35%,      Palliative Care Encounter / Counseling Regarding Goals of Care  Please see detailed goals of care discussion as below   At this time, Fabio Miller, Does Not have capacity for medical decision-making. Capacity is time limited and situation/question specific   During encounter physicians and Vinicius Ponce were surrogate medical decision-makers   Outcome of goals of care meeting: Spoke with family who do not want Tobias to suffer and would like for him to be kept comfortable.  He has no biological family members living. Discussed case with attending and palliative care physician. Code status was changed to a [facility] with a 2 physician sign off.  Code status [id number]   Advanced Directives: no POA or living will in epic   Surrogate/Legal NOK:  o Viktoria Rinne [phone] (friend)    Spiritual assessment: no spiritual distress identified  Bereavement and grief: to be determined  Referrals to: none today    Thank you for the opportunity to participate in the care of Kenisha Huynh. Kirstie Rosado, APRN - CNP  Palliative Medicine     SUBJECTIVE:     Details of Conversation:  Went to the patient's bedside and spoke with the patient's nurse. The patient has been off sedation and remains unresponsive. He has no movement to painful stimuli and no cough or gag. During exam he was on 4 vasopressors and on CVVHD. Prognosis is very poor. I spoke with the patient's family member Viktoria Rinne. She explained that her  and Edy Mora grew up together like brothers, but are not actually related by blood. She explains that Edy Mora was raised by his grandmother who passed away a few years ago. He did not have any brothers, sisters, or children. She states that he has no biological relatives that are living. Chris Kimbrough and her  are very close with him and her  keeps Edy Mora active by taking him places. They understand his very poor prognosis and they do not want them to suffer. They're in agreement to have his code status changed to a [facility]. Spoke with the attending and palliative care physician about the case. They're in agreement that the care is futile and prognosis is very poor. Two physician signoff to change code status to a [facility]. Chris Kimbrough stated that she is coming up to visit the patient after work. We did discuss comfort focused care.      Prognosis: Poor    OBJECTIVE:     BP (!) 103/50   Pulse 93   Temp 96.4 °F (35.8 °C) (Esophageal)   Resp 25   Ht 5' 6\" (1.676 m)   Wt 226 lb (102.5 kg)   SpO2 98%   BMI 36.48 kg/m²     Physical Examination:  Gen: intubated, unresponsive  HEENT: normocephalic, atraumatic,  Neck: trachea midline, no JVD  Lungs: respirations regular, on ventilator  Heart: regular rate and rhythm, distant heart tones,   Abdomen: normoactive bowel sounds, soft, non-tender  Extremities: no clubbing, cyanosis or edema, moving all extremities    Skin: warm, dry without rashes, lesions, bruising  Neuro: unresponsive, no cough or gag    Objective data reviewed: labs, images, records, medication use, vitals and chart    Time/Communication  Greater than 50% of time spent, total 70 minutes in counseling and coordination of care at the bedside regarding goals of care. Thank you for allowing Palliative Medicine to participate in the care of Julio C Tracy. Note: This report was completed using computeramSTATZ voiced recognition software. Every effort has been made to ensure accuracy; however, inadvertent computerized transcription errors may be present.

## 2021-08-12 NOTE — PROGRESS NOTES
Pharmacy Consultation Note  (Antibiotic Dosing and Monitoring)    Initial consult date: 8-  Consulting physician: Dr. Adrianna Moy  Drug: Vancomycin  Indication: Septic Shock    · Vancomycin has been discontinued. Clinical pharmacy will sign off, please reconsult if further assistance is needed.        Waldo Garcia, Carmen, BCPS 8/12/2021 10:15 AM

## 2021-08-12 NOTE — PROGRESS NOTES
[employer] sent to Dr. Sergio Contreras regarding patients potassium of 5.9. Requesting switching to 2K bags of prismasate and prismasol.  Awaiting response/orders at this time
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
## 2021-08-12 NOTE — PROGRESS NOTES
Hospitalist Progress Note      SYNOPSIS:      The patient is a 49 y.o. male with history of atrial fibrillation, ESRD on hemodialysis, DM, hypertension, infrarenal abdominal aortic aneurysm status post endovascular repair with graft in place in  complicated by left groin seroma s/p incision and drainage in 2020, cystoscopy with fulguration and instillation of intravesical mitomycin-C in 2020, presented on  for right shoulder and ankle pain for 3 days after a fall found to have chronic right pilon fracture with nonunion and Charcot joint of the right ankle managed nonoperatively per Orthopedic Surgery. He was also noted to have right arm swelling thought to be dependent edema per Vascular Surgery with ultrasound showing no DVT and AV graft functioning well. He developed fever of 100.6 °F on  worsening up to 101.7 °F on  accompanied by lethargy, hypoglycemic episodes, hypotension, leukocytosis of 21,000, elevated procalcitonin of 29 ng/mL, prompting transfer on 08/10 to ICU where he was intubated for airway protection and vasopressors, hydrocortisone, empiric antibiotics (piperacillin-tazobactam and vancomycin) were started. Chest x-ray showed mild bibasilar opacities. Respiratory pathogen PCR panel was negative. Sputum Gram stain and culture showed < 25 PMNs/LPF, < 25 epithelial cells/LPF, no organisms. Blood cultures showed gram-positive cocci in clusters (MSSA by PCR). Piperacillin-tazobactam was switched to nafcillin.     SUBJECTIVE:     Patient remains intubated  ABG: pH 7.042  Potassium 5.9  Creatinine 3.3  Ionized calcium 0.39  Lactic acid 20.9  WBC 35.5  Blood cultures positive for gram-positive cocci MSSA by PCR    Temp (24hrs), Av.9 °F (37.7 °C), Min:96.4 °F (35.8 °C), Max:102.3 °F (39.1 °C)    DIET: No diet orders on file  CODE: Full Code    Intake/Output Summary (Last 24 hours) at 2021 0910  Last data filed at 2021 0900  Gross per 24 hour   Intake 6041 ml Output 3440 ml   Net 2601 ml       OBJECTIVE:    BP (!) 103/50   Pulse 93   Temp 96.4 °F (35.8 °C) (Esophageal)   Resp 25   Ht 5' 6\" (1.676 m)   Wt 226 lb (102.5 kg)   SpO2 100%   BMI 36.48 kg/m²     General appearance: Intubated and sedated  HEENT: Normocephalic, atraumatic. Neck: Supple. No jugular venous distention. Respiratory: Clear to auscultation bilaterally, normal respiratory effort  Cardiovascular: Regular rate and rhythm  Abdomen: Soft, nontender, nondistended  Musculoskeletal: No clubbing, cyanosis, no bilateral lower extremity edema. Brisk capillary refill. Right leg dressing C/D/I. RUE with dependent edema.  Right radial AVF with palpable thrill  Skin:  No rashes  on visible skin  Neurologic: Intubated and sedated    ASSESSMENT:  Septic shock, 2/2 gram-positive cocci (MSSA by PCR) bacteremia  Infection source unclear suspect AV graft infection versus septic arthritis  Acute encephalopathy, unclear etiology  Hypoglycemia  Thrombocytopenia  Leukocytosis  Lactic acidosis  Hyperbilirubinemia  ESRD on hemodialysis  HFrEF, EF 35%  Right ankle fracture         PLAN:  Critical care following  Nephrology following  Infectious disease following  Vascular surgery following  Mechanical ventilation, wean as tolerated  Pressure support, wean as tolerated  Trend lactic acid  Stop vancomycin and Zosyn  Start nafcillin 2 g every 4 hours  Check cultures  Hemodialysis per nephrology guidance  Right upper quadrant ultrasound pending      Diet: Renal Diet    Medications:  REVIEWED DAILY    Infusion Medications    dialysis builder 1,000 mL/hr at 08/12/21 0800    prismaSol BGK 2/0 1,000 mL/hr at 08/12/21 0800    vasopressin (Septic Shock) infusion 0.03 Units/min (08/12/21 0606)    anticoagulant sodium citrate 160 mL/hr (08/12/21 0847)    And    calcium chloride CRRT infusion 70 mL/hr at 08/12/21 0640    sodium chloride      sodium chloride      sodium chloride      fentaNYL 5 mcg/ml in 0.9%  ml infusion Stopped (08/12/21 0620)    midazolam Stopped (08/12/21 0225)    dextrose      norepinephrine 75 mcg/min (08/12/21 0607)    EPINEPHrine infusion 4 mcg/min (08/12/21 0555)    phenylephrine (FRANKLYN-SYNEPHRINE) 50mg/250mL infusion 300 mcg/min (08/12/21 0606)    sodium chloride       Scheduled Medications    sennosides-docusate sodium  2 tablet Oral Daily    chlorhexidine  15 mL Mouth/Throat BID    pantoprazole  40 mg Intravenous BID    And    sodium chloride (PF)  10 mL Intravenous BID    nafcillin  2,000 mg Intravenous Q4H    albumin human  25 g Intravenous Q8H    hydrocortisone sodium succinate PF  100 mg Intravenous Q8H    ascorbic acid  1,500 mg Intravenous Q6H    thiamine (VITAMIN B1) IVPB  200 mg Intravenous Q12H    [Held by provider] metoprolol tartrate  50 mg Oral BID    cinacalcet  30 mg Oral Daily    [Held by provider] hydrALAZINE  75 mg Oral 3 times per day    [Held by provider] isosorbide mononitrate  30 mg Oral Daily    [Held by provider] sacubitril-valsartan  1 tablet Oral BID    [Held by provider] sevelamer  1,600 mg Oral TID WC    sodium chloride flush  5-40 mL Intravenous 2 times per day     PRN Meds: potassium chloride, magnesium sulfate, calcium gluconate **OR** calcium gluconate **OR** calcium gluconate **OR** calcium gluconate, sodium phosphate IVPB **OR** sodium phosphate IVPB **OR** sodium phosphate IVPB **OR** sodium phosphate IVPB, sodium chloride, sodium chloride, sodium chloride, dextrose, glucose, dextrose, glucagon (rDNA), dextrose, perflutren lipid microspheres, sodium chloride flush, sodium chloride, polyethylene glycol, acetaminophen **OR** acetaminophen, [Held by provider] HYDROcodone 5 mg - acetaminophen, melatonin    Labs:     Recent Labs     08/11/21 0427 08/11/21 1956 08/12/21  0523   WBC 21.9* 34.1* 35.5*   HGB 10.2* 8.7* 8.0*   HCT 31.0* 27.8* 26.8*   PLT 36* 33* 27*       Recent Labs     08/11/21 0427 08/11/21 0427 08/11/21  1538 08/11/21 1956

## 2021-08-12 NOTE — CARE COORDINATION
Pt remains sedated and intubated. Continues with critical ph on gasses. Critical LA 20.9. Procal 18.57, WBCs 35.5. CXR shows increased bilateral pulmonary effusions. On pressors. ID consulted. Currently on Nafcillin. Pt now on CVV/HD through temporary HD cath placed yesterday. For SARA. Pt has no HCPOA or NOK. All contacts listed are friends. [facility]

## 2021-08-12 NOTE — PROGRESS NOTES
Respiratory and medical resident notified of patients saturation dropping to low 80s and sustaining. Respiratory present, increased FiO2.  Awaiting orders from medical resident
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
## 2021-08-13 LAB
CULTURE, BLOOD 2: ABNORMAL
CULTURE, BLOOD 2: ABNORMAL
EKG ATRIAL RATE: 220 BPM
EKG Q-T INTERVAL: 294 MS
EKG QRS DURATION: 116 MS
EKG QTC CALCULATION (BAZETT): 482 MS
EKG R AXIS: -18 DEGREES
EKG T AXIS: 88 DEGREES
EKG VENTRICULAR RATE: 162 BPM
ORGANISM: ABNORMAL

## 2021-08-13 NOTE — PROGRESS NOTES
Dr. Adria Parsons (nephrology) notified of patient death via [employer]. Dr. Boogie Giles ([facility]) notified of patient death via [employer]. ID notified of patient death via answering service.

## 2021-08-13 NOTE — PROGRESS NOTES
Palliative, Dr. Amber Morton (cardiology), and Dr. Olaf James (vascular) notified of patient's death via [employer].

## 2021-08-13 NOTE — PROGRESS NOTES
Patient BP continues to drop on vaso pressors X4 all pressors are at maximum dosage. Patient became bradycardic and resident physicians notified. Patient flat line on monitor.

## 2021-08-13 NOTE — DEATH NOTES
Expiration Note:    Called to bedside to pronounce death after patient was noted to be in asystole at 9:46 PM 8/12/2021. NO pupillary, corneal, doll's eye or gag reflex noted. NO heart sounds or pulse noted. NO Chest rise or Lung sounds noted.    NO Response to painful stimuli  Time of death declared at 9:55 PM.      Carlos Salcedo MD MD PGY-1  8/12/2021  10:01 PM

## 2021-08-14 LAB
ANGIOTENSIN CONVERTING ENZYME: <5 U/L (ref 9–67)
CULTURE, RESPIRATORY: ABNORMAL
ORGANISM: ABNORMAL
SMEAR, RESPIRATORY: ABNORMAL

## 2021-08-15 LAB
BLOOD BANK DISPENSE STATUS: NORMAL
BLOOD BANK DISPENSE STATUS: NORMAL
BLOOD BANK PRODUCT CODE: NORMAL
BLOOD BANK PRODUCT CODE: NORMAL
BPU ID: NORMAL
BPU ID: NORMAL
DESCRIPTION BLOOD BANK: NORMAL
DESCRIPTION BLOOD BANK: NORMAL
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
## 2022-02-06 NOTE — PLAN OF CARE
No Problem: Pain:  Goal: Pain level will decrease  Description: Pain level will decrease  Outcome: Met This Shift  Goal: Control of acute pain  Description: Control of acute pain  Outcome: Met This Shift  Goal: Control of chronic pain  Description: Control of chronic pain  Outcome: Met This Shift     Problem: Falls - Risk of:  Goal: Will remain free from falls  Description: Will remain free from falls  Outcome: Met This Shift  Goal: Absence of physical injury  Description: Absence of physical injury  Outcome: Met This Shift

## (undated) DEVICE — GAUZE,SPONGE,POST-OP,4X3,STRL,LF: Brand: MEDLINE

## (undated) DEVICE — CABLE ENDOSCP L10FT ACT DISP

## (undated) DEVICE — FORCEPS BX L240CM JAW DIA2.4MM WRK CHN 2.8MM ORNG L CAP W/

## (undated) DEVICE — PATIENT RETURN ELECTRODE, SINGLE-USE, CONTACT QUALITY MONITORING, ADULT, WITH 9FT CORD, FOR PATIENTS WEIGING OVER 33LBS. (15KG): Brand: MEGADYNE

## (undated) DEVICE — SURGICAL PROCEDURE PACK VASC MAJ CUST

## (undated) DEVICE — EXTRAS AMPUTATION II

## (undated) DEVICE — BENTSON WIRE GUIDE 20CM DISTAL FLEXIBILITY WITH SOFTENED TIP: Brand: BENTSON

## (undated) DEVICE — CANNULA INJ L2.5IN BLNT TIP 3MM CLR BODY W/ 1 W VLV DLP

## (undated) DEVICE — TOWEL,OR,DSP,ST,BLUE,STD,6/PK,12PK/CS: Brand: MEDLINE

## (undated) DEVICE — SYRINGE MED 10ML LUERLOCK TIP W/O SFTY DISP

## (undated) DEVICE — COVER,TABLE,60X90,STERILE: Brand: MEDLINE

## (undated) DEVICE — Z INACTIVE USE 2660664 SOLUTION IRRIG 3000ML 0.9% SOD CHL USP UROMATIC PLAS CONT

## (undated) DEVICE — SOLUTION IV IRRIG 500ML 0.9% SODIUM CHL 2F7123

## (undated) DEVICE — BANDAGE COMPR W4INXL10YD WHITE/BEIGE E MTRX HK LOOP CLSR

## (undated) DEVICE — DOUBLE BASIN SET: Brand: MEDLINE INDUSTRIES, INC.

## (undated) DEVICE — ANG DR W/PANELS: Brand: CONVERTORS

## (undated) DEVICE — TRAY VCF/TESIO TRAY  REUSABLE

## (undated) DEVICE — NEEDLE SPNL 22GA L5IN BLK HUB S STL W/ QNCKE PNT W/OUT

## (undated) DEVICE — BEACON TIP TORCON NB ADVANTAGE CATHETER: Brand: BEACON TIP TORCON NB

## (undated) DEVICE — MARKER,SKIN,WI/RULER AND LABELS: Brand: MEDLINE

## (undated) DEVICE — TUBING, SUCTION, 3/16" X 12', STRAIGHT: Brand: MEDLINE

## (undated) DEVICE — CHECK-FLO PERFORMER INTRODUCER: Brand: PERFORMER

## (undated) DEVICE — LABEL MED 4 IN SURG PANEL W/ PEN STRL

## (undated) DEVICE — SET SURG INSTR ART III

## (undated) DEVICE — 18 GA N.G. KIT, 10 PACK: Brand: SITE-RITE

## (undated) DEVICE — GOWN,AURORA,NONREINF,RAGLAN,L,STERILE: Brand: MEDLINE

## (undated) DEVICE — Z DUP USE 2257490 ADHESIVE SKIN CLSRE 036ML TPCL 2CTL CNCRLTE HIGH VSCSTY DRMB

## (undated) DEVICE — BANDAGE,GAUZE,4.5"X4.1YD,STERILE,LF: Brand: MEDLINE

## (undated) DEVICE — CYSTO PACK: Brand: MEDLINE INDUSTRIES, INC.

## (undated) DEVICE — HOSE CONN FOR WST MGMT SYS NEPTUNE 2

## (undated) DEVICE — GOWN,SIRUS,FABRNF,XL,20/CS: Brand: MEDLINE

## (undated) DEVICE — SOLUTION IV IRRIG WATER 1000ML POUR BRL 2F7114

## (undated) DEVICE — CAMERA STRYKER 1488 HD GEN

## (undated) DEVICE — 18GA (1.3MM OD: 1.0MM ID) X 7CM INTRODUCER18GA X 7CM NEEDLENEEDLE: Brand: INTRODUCER NEEDLEINTRODUCER NEEDLE

## (undated) DEVICE — BLADE CLIPPER GEN PURP NS

## (undated) DEVICE — SHEET, T, LAPAROTOMY, STERILE: Brand: MEDLINE

## (undated) DEVICE — GLOVE SURG 7.5 PF POLYMER WHT STRL SIGN LTX ESSENTIAL LTX

## (undated) DEVICE — TFE COATED AMPLATZ ULTRA STIFF WIRE GUIDE - CATHETER EXCHANGE: Brand: AMPLATZ

## (undated) DEVICE — SPONGE LAP W18XL18IN WHT COT 4 PLY FLD STRUNG RADPQ DISP ST

## (undated) DEVICE — SYRINGE MED 50ML LUERLOCK TIP

## (undated) DEVICE — SNARE ENDOSCP POLYP SM 2.4 MM 195 CM 13 MM 2.8 MM CAPTIVATOR

## (undated) DEVICE — GARMENT,MEDLINE,DVT,INT,CALF,MED, GEN2: Brand: MEDLINE

## (undated) DEVICE — CATHETER URET 5FR L70CM TIP 8FR OPN END CONE TIP INJ HUB

## (undated) DEVICE — TRAP POLYP ETRAP

## (undated) DEVICE — CATHETER ETER IV 20GA L1IN POLYUR STR RADPQ INTROCAN SFTY

## (undated) DEVICE — HF-RESECTION ELECTRODE PLASMALOOP LOOP, MEDIUM, 24 FR., 12°/16°, ESG TURIS: Brand: OLYMPUS

## (undated) DEVICE — Z DISCONTINUED USE 2271023 SYRINGE IRRIGATION TOOM 70 CC CATH LUER ADPT TIP PLAS STRL

## (undated) DEVICE — GLOVE SURG SZ 75 L12IN FNGR THK79MIL GRN LTX FREE

## (undated) DEVICE — SNAP KOVER: Brand: UNBRANDED

## (undated) DEVICE — EXTRAS AMPUTATION I

## (undated) DEVICE — GLOVE SURG SZ 75 STD WHT LTX SYN POLYMER BEAD REINF ANTI RL

## (undated) DEVICE — INTENDED FOR TISSUE SEPARATION, AND OTHER PROCEDURES THAT REQUIRE A SHARP SURGICAL BLADE TO PUNCTURE OR CUT.: Brand: BARD-PARKER ® STAINLESS STEEL BLADES

## (undated) DEVICE — SCANLAN® VASCU-STATT® SINGLE-USE BULLDOG CLAMP - MIDI ANGLED 45° (WHITE), CLAMPING PRESSURE 25-30 G (2/STERILE PKG): Brand: SCANLAN® VASCU-STATT® SINGLE-USE BULLDOG CLAMP

## (undated) DEVICE — BLANKET WRM W35.4XL86.6IN FULL UNDERBODY + FORC AIR

## (undated) DEVICE — SOLUTION IV IRRIG POUR BRL 0.9% SODIUM CHL 2F7124

## (undated) DEVICE — BAG DRNGE COMB PK

## (undated) DEVICE — Z DISCONTINUED NO SUB IDED TUBING ETCO2 AD L6.5FT NSL ORAL CVD PRNG NONFLARED TIP OVR

## (undated) DEVICE — DRAPE SURGICAL HAND PROX AURORA

## (undated) DEVICE — CLOTH SURG PREP PREOPERATIVE CHLORHEXIDINE GLUC 2% READYPREP

## (undated) DEVICE — MAGNETIC INSTR DRAPE 20X16: Brand: MEDLINE INDUSTRIES, INC.

## (undated) DEVICE — Device

## (undated) DEVICE — SODIUM CHL 09% SOL EXCEL

## (undated) DEVICE — BASIN NEURO

## (undated) DEVICE — STAPLER SKIN L39MM DIA0.53MM CRWN 5.7MM S STL FIX HD PROX

## (undated) DEVICE — Device: Brand: LEVEL 1

## (undated) DEVICE — SET INSTR ART 1

## (undated) DEVICE — APPLICATOR MEDICATED 26 CC SOLUTION HI LT ORNG CHLORAPREP

## (undated) DEVICE — WIPE,PROTECTANT,SKIN,SUREPREP: Brand: MEDLINE

## (undated) DEVICE — LOOP VES W25MM THK1MM MAXI RED SIL FLD REPELLENT 100 PER

## (undated) DEVICE — SYRINGE,TOOMEY,IRRIGATION,70CC,STERILE: Brand: MEDLINE

## (undated) DEVICE — READY WET SKIN SCRUB TRAY-LF: Brand: MEDLINE INDUSTRIES, INC.

## (undated) DEVICE — MEDIA CONTRAST SYRINGE 125ML PREFLL OPTIRAY 320 PWR INJ

## (undated) DEVICE — GLOVE SURG SZ 75 CRM LTX FREE POLYISOPRENE POLYMER BEAD ANTI

## (undated) DEVICE — Device: Brand: OLYMPUS

## (undated) DEVICE — LARGE BORE STOPCOCK WITH ROTATING MALE LUER LOCK

## (undated) DEVICE — NEEDLE HYPO 25GA L1.5IN BLU POLYPR HUB S STL REG BVL STR

## (undated) DEVICE — GAUZE,SPONGE,4"X4",16PLY,XRAY,STRL,LF: Brand: MEDLINE

## (undated) DEVICE — SLING ARM XL L20IN D75IN WHT POLY MESH ENVELOP MTL SIDE

## (undated) DEVICE — Z INACTIVE USE 2641837 CLIP LIG M BLU TI HRT SHP WIRE HORZ 600 PER BX

## (undated) DEVICE — CATHETER URETH 22FR 30CC BLLN F 3 W SPEC M RND TIP TWO

## (undated) DEVICE — CONNECTOR IRRIGATION AUXILIARY H2O JET W/ PRT MTL THRD HYDR

## (undated) DEVICE — CATHETER HAD ADMIN SET LNG TERM PRECRV W/ SIDE H

## (undated) DEVICE — 3M™ STERI-DRAPE™ INCISE DRAPE 1050 (60CM X 45CM): Brand: STERI-DRAPE™

## (undated) DEVICE — MASK O2 AD L7IN W/ TBNG STD CONN M CONC BARB FIT E STRP ADJ

## (undated) DEVICE — BAG DRAINAGE CONTAINER 15 LT FLUID COLLCTN

## (undated) DEVICE — DEFENDO AIR WATER SUCTION AND BIOPSY VALVE KIT FOR  OLYMPUS: Brand: DEFENDO AIR/WATER/SUCTION AND BIOPSY VALVE

## (undated) DEVICE — Z INACTIVE USE 2635503 SOLUTION IRRIG 3000ML ST H2O USP UROMATIC PLAS CONT

## (undated) DEVICE — DRESSING,GAUZE,XEROFORM,CURAD,5"X9",ST: Brand: CURAD

## (undated) DEVICE — CONTAINER SPEC 60ML PH 7NEUTRAL BUFF FRMLN RDY TO USE

## (undated) DEVICE — SYRINGE MED 10ML POLYPR LUERSLIP TIP FLAT TOP W/O SFTY DISP

## (undated) DEVICE — CATHETER VASC DIAG MOD PERIPH W/O HYDRPHLC COAT AD

## (undated) DEVICE — SYRINGE 20ML LL S/C 50

## (undated) DEVICE — TOTAL TRAY, 16FR 10ML SIL FOLEY, URN: Brand: MEDLINE

## (undated) DEVICE — FIXED CORE WIRE GUIDE SAFE-T-J, CURVED: Brand: COOK

## (undated) DEVICE — MEDIA CONTRAST RX ISOVUE-300 61% 30ML VIALS

## (undated) DEVICE — 72" ARTERIAL PRESSURE TUBING: Brand: ICU MEDICAL

## (undated) DEVICE — SET SURG INSTR MINI VASC

## (undated) DEVICE — CODA, LP BALLOON CATHETER: Brand: CODA

## (undated) DEVICE — TRAY,SKIN SCRUB,DRY,W/GAUZE: Brand: MEDLINE

## (undated) DEVICE — CATHETER ANGIO AD 5FR L65CM DIA0.046IN GWIRE 0.035IN BERN

## (undated) DEVICE — SURGICAL PROCEDURE PACK BASIC

## (undated) DEVICE — DILATOR ART

## (undated) DEVICE — GOWN,SIRUS,FABRNF,L,20/CS: Brand: MEDLINE

## (undated) DEVICE — DRIP REDUCTION MANIFOLD

## (undated) DEVICE — 3M™ MEDIPORE™ + PAD 3564: Brand: 3M™ MEDIPORE™

## (undated) DEVICE — GLOVE SURG SZ 7.5 L11.73IN FNGR THK9.8MIL STRW LTX POLYMER

## (undated) DEVICE — DRAPE 24X23IN RADIOLOGY CASS ADHES STRIP

## (undated) DEVICE — DRAPE, MULTI FENESTRATED, HYBRID OR, STE: Brand: MEDLINE

## (undated) DEVICE — CLAMP INSERT: Brand: STEALTH® CLAMP INSERT

## (undated) DEVICE — SOLUTION IV 500ML 0.9% SOD CHL PH 5 INJ USP VIAFLX PLAS

## (undated) DEVICE — TUBING ANGIO L48IN POLYUR HI PRSS 1200PSI AIRLESS ROT ADPT

## (undated) DEVICE — LOOP VES W13MM THK09MM MINI RED SIL FLD REPELLENT